# Patient Record
Sex: MALE | Race: BLACK OR AFRICAN AMERICAN | NOT HISPANIC OR LATINO | Employment: FULL TIME | URBAN - METROPOLITAN AREA
[De-identification: names, ages, dates, MRNs, and addresses within clinical notes are randomized per-mention and may not be internally consistent; named-entity substitution may affect disease eponyms.]

---

## 2017-10-19 ENCOUNTER — HOSPITAL ENCOUNTER (OUTPATIENT)
Dept: RADIOLOGY | Facility: HOSPITAL | Age: 57
Discharge: HOME | End: 2017-10-19
Attending: INTERNAL MEDICINE
Payer: COMMERCIAL

## 2017-10-19 DIAGNOSIS — M25.552 LEFT HIP PAIN: Primary | ICD-10-CM

## 2017-10-19 DIAGNOSIS — M25.552 LEFT HIP PAIN: ICD-10-CM

## 2017-10-19 PROCEDURE — 73502 X-RAY EXAM HIP UNI 2-3 VIEWS: CPT | Mod: 26,LT,, | Performed by: RADIOLOGY

## 2017-10-19 PROCEDURE — 73502 X-RAY EXAM HIP UNI 2-3 VIEWS: CPT | Mod: TC,LT

## 2017-10-26 PROBLEM — Z96.641 STATUS POST RIGHT HIP REPLACEMENT: Status: ACTIVE | Noted: 2017-10-26

## 2017-10-26 PROBLEM — I73.9 PVD (PERIPHERAL VASCULAR DISEASE): Status: ACTIVE | Noted: 2017-10-26

## 2017-10-31 ENCOUNTER — HOSPITAL ENCOUNTER (OUTPATIENT)
Dept: RADIOLOGY | Facility: HOSPITAL | Age: 57
Discharge: HOME | End: 2017-10-31
Attending: SURGERY
Payer: COMMERCIAL

## 2017-10-31 DIAGNOSIS — I10 HYPERTENSION, UNSPECIFIED TYPE: ICD-10-CM

## 2017-10-31 DIAGNOSIS — E78.5 DYSLIPIDEMIA: ICD-10-CM

## 2017-10-31 DIAGNOSIS — Z95.820 S/P ANGIOPLASTY WITH STENT: Chronic | ICD-10-CM

## 2017-10-31 DIAGNOSIS — I73.9 PVD (PERIPHERAL VASCULAR DISEASE): ICD-10-CM

## 2017-10-31 PROCEDURE — 93925 LOWER EXTREMITY STUDY: CPT | Mod: TC

## 2017-10-31 PROCEDURE — 93925 LOWER EXTREMITY STUDY: CPT | Mod: 26,,, | Performed by: RADIOLOGY

## 2017-11-07 PROBLEM — L03.031 INFECTION OF NAIL BED OF TOE OF RIGHT FOOT: Status: ACTIVE | Noted: 2017-11-07

## 2017-11-09 ENCOUNTER — HOSPITAL ENCOUNTER (OUTPATIENT)
Dept: PREADMISSION TESTING | Facility: HOSPITAL | Age: 57
Discharge: HOME | End: 2017-11-09
Attending: SURGERY
Payer: COMMERCIAL

## 2017-11-09 ENCOUNTER — ANESTHESIA EVENT (OUTPATIENT)
Dept: SURGERY | Facility: HOSPITAL | Age: 57
End: 2017-11-09
Payer: COMMERCIAL

## 2017-11-09 VITALS
TEMPERATURE: 98 F | HEIGHT: 76 IN | DIASTOLIC BLOOD PRESSURE: 78 MMHG | WEIGHT: 241.5 LBS | SYSTOLIC BLOOD PRESSURE: 143 MMHG | RESPIRATION RATE: 19 BRPM | BODY MASS INDEX: 29.41 KG/M2 | OXYGEN SATURATION: 99 % | HEART RATE: 86 BPM

## 2017-11-09 PROCEDURE — 93005 ELECTROCARDIOGRAM TRACING: CPT

## 2017-11-09 NOTE — DISCHARGE INSTRUCTIONS
Your surgery is scheduled for  11/10/2017    Please report to Procedure Check-in Room on the 2nd floor at  10:15  A.M.          INSTRUCTIONS IMPORTANT!!!  ¨ Do not eat or drink after 4 AM-including water. OK to brush teeth, no   gum, candy or mints!      NO MEDICATIONS IN THE MORNING      ____  Do not wear makeup, including mascara.  ____  Please remove all jewelry, including piercings and leave at home.  ____  No money or valuables needed. Please leave at home.  ____  If going home the same day, arrange for a ride home. You will not be able to             drive if Anesthesia was used.  ____  Wear loose fitting clothing. Allow for dressings, bandages.  ____  Stop Aspirin, Ibuprofen, Motrin and Aleve at least 3-5 days before surgery, unless otherwise instructed by your doctor, or the nurse.            You MAY use Tylenol/acetaminophen until day of surgery.  ____ Stop taking any Fish Oil supplements or any Vitamins that contain Vitamin E at least 5 days prior to surgery.  ____  If you take diabetic medication, do not take am of surgery unless instructed by Doctor.  ____  Call MD for temperature above 101 degrees.  ____ Do Not wear your contact lenses the day of your procedure.  You may wear your glasses.        I have read or had read and explained to me, and understand the above information.  Additional comments or instructions:  For additional questions call [phone]     Take a Hibiclens shower the night before and  the morning of surgery.    NO powders, creams, or lotions on your skin the day of surgery.          Pre-Op Bathing Instructions    Before surgery, you can play an important role in your own health.    Because skin is not sterile, we need to be sure that your skin is as free of germs as possible. By following the instructions below, you can reduce the number of germs on your skin before surgery.    IMPORTANT: You will need to shower with a special soap called Hibiclens*, available at any pharmacy.  If  you are allergic to Chlorhexidine (the antiseptic in Hibiclens), use an antibacterial soap such as Dial Soap for your preoperative shower.  You will shower with Hibiclens the night before and the morning of your surgery.  Do not use Hibiclens on the head, face or genitals to avoid injury to those areas.     TWICE DAILY STARTING 3 DAYS PRIOR TO YOUR SURGERY     1. Do not shave the area of your body where your surgery will be performed.  2. Shower and wash your hair and body as usual with your normal soap and shampoo.  3. Rinse your hair and body thoroughly after you shower to remove all soap residue.  4. With your hand, apply Hibiclens soap to the surgical site.   5. Wash the site gently for five (5) minutes. Do not scrub your skin too hard.   6. Do not wash with your regular soap after Hibiclens is used.  7. Rinse your body thoroughly.  8. Pat yourself dry with a clean, soft towel.  9. Do not use lotion, cream, or powder.  10. Wear clean clothes.     THE MORNING OF YOUR SURGERY     1. Repeat above showering procedure.    * Not to be used by people allergic to Chlorhexidine.              Anesthesia: Monitored Anesthesia Care (MAC)    Youre due to have surgery. During surgery, youll be given medicine called anesthesia. This will keep you comfortable and pain-free. Your surgeon will use monitored anesthesia care (MAC). This sheet tells you more about this type of anesthesia.  What is monitored anesthesia care?  MAC keeps you very drowsy during surgery. You may be awake, but you will likely not remember much. And you wont feel pain. With MAC, medicines are given through an IV line into a vein in your arm or hand. A local anesthetic will usually be injected into the skin and muscle around the surgical site to numb it. The anesthesia provider monitors you during the procedure. He or she checks your heart rate and rhythm, blood pressure, and blood oxygen level.  Anesthesia tools and medicines that may be near you during  your procedure  You will likely have:  · A pulse oximeter on the end of your finger. This measures your blood oxygen level.  · Electrocardiography leads (electrodes) on your chest. These record your heart rate and rhythm.  · Medicines given through an IV. These relax you and prevent pain. You may be awake or sleep lightly. If you have local anesthetic, it is injected directly into your skin.  · A facemask to give you oxygen, if needed.  Risks and possible complications  MAC has some risks. These include:  · Breathing problems  · Nausea and vomiting  · Allergic reaction to the anesthetic    Anesthesia safety  Tips for anesthesia safety include the following:   · Follow all instructions you are given for how long not to eat or drink before your procedure.  · Be sure your healthcare provider knows what medicines you take, especially any anti-inflammatory medicine or blood thinners. This includes aspirin and any other over-the-counter medicines, herbs, and supplements.  · Have an adult family member or friend drive you home after the procedure.  · For the first 24 hours after your surgery:  ¨ Do not drive or use heavy equipment.  ¨ Do not make important decisions or sign documents.  ¨ Avoid alcohol.  ¨ Have someone stay with you, if possible. They can watch for problems and help keep you safe.  Date Last Reviewed: 12/1/2016  © 4472-8825 [employer], [employer]. [address] [place] [address], [place] [address]. All rights reserved. This information is not intended as a substitute for professional medical care. Always follow your healthcare professional's instructions.

## 2017-11-09 NOTE — PLAN OF CARE
Allergies, medical, surgical, family and psychosocial histories reviewed with patient.  Periop plan of care discussed. Preop instructions given, including medications to take and to hold. Time given for questions and pt voiced understanding.

## 2017-11-09 NOTE — PRE-PROCEDURE INSTRUCTIONS
Your surgery is scheduled for  11/10/2017    Please report to Procedure Check-in Room on the 2nd floor at  10:15  A.M.          INSTRUCTIONS IMPORTANT!!!  ¨ Do not eat or drink after 4 AM-including water. OK to brush teeth, no   gum, candy or mints!      NO MEDICATIONS IN THE MORNING

## 2017-11-09 NOTE — ANESTHESIA PREPROCEDURE EVALUATION
11/09/2017  Santiago Lucia is a 56 y.o., male is scheduled for debridement of wound to right great toe under MAC/gen on 11/10/2017.    Past Surgical History:   Procedure Laterality Date    CARDIAC CATHETERIZATION      CORONARY ANGIOPLASTY WITH STENT PLACEMENT  01/2013    [facility] RCA and LAD    LUMBAR LAMINECTOMY  2011    TOTAL HIP ARTHROPLASTY Right 12/2011          BP Readings from Last 3 Encounters:   11/09/17 (!) 143/78   11/07/17 131/78   10/26/17 (!) 162/89     Anesthesia Evaluation    I have reviewed the Patient Summary Reports.    I have reviewed the Nursing Notes.   I have reviewed the Medications.     Review of Systems  Anesthesia Hx:  No problems with previous Anesthesia  History of prior surgery of interest to airway management or planning: Previous anesthesia: General  Denies Personal Hx of Anesthesia complications.   Social:  Former Smoker, Alcohol Use    Hematology/Oncology:  Hematology Normal      Hematology Comments: Not currently on DAPT   EENT/Dental:EENT/Dental Normal   Cardiovascular:   Hypertension, well controlled Past MI (2013) CAD asymptomatic CABG/stent (JACKIE x1 2013)  Denies Dysrhythmias.   Denies Angina. PVD (non-healing wound to right great toe) hyperlipidemia        Pulmonary:   Denies Shortness of breath.    Renal/:  Renal/ Normal     Hepatic/GI:   GERD, well controlled    Neurological:  Neurology Normal    Endocrine:  Endocrine Normal    Psych:   Pt wife terminally ill         Physical Exam  General:  Obesity    Airway/Jaw/Neck:  Airway Findings: Mouth Opening: Normal Tongue: Normal  General Airway Assessment: Adult  Mallampati: II  TM Distance: Normal, at least 6 cm        Eyes/Ears/Nose:  EYES/EARS/NOSE FINDINGS: Normal   Dental:  Dental Findings: Periodontal disease, Mild, In tact   Chest/Lungs:  Chest/Lungs Clear    Heart/Vascular:  Heart Findings: Normal Heart murmur:  negative    Abdomen:  Abdomen Findings: Normal     Skin:  Skin Findings:  Right great toe exam deferred     Mental Status:  Mental Status Findings: Normal        Anesthesia Plan  Type of Anesthesia, risks & benefits discussed:  Anesthesia Type:  general, MAC  Patient's Preference:   Intra-op Monitoring Plan:   Intra-op Monitoring Plan Comments:   Post Op Pain Control Plan:   Post Op Pain Control Plan Comments:   Induction:   IV  Beta Blocker:  Patient is not currently on a Beta-Blocker (No further documentation required).       Informed Consent: Patient understands risks and agrees with Anesthesia plan.  Questions answered.   ASA Score: 3     Day of Surgery Review of History & Physical: I have interviewed and examined the patient. I have reviewed the patient's H&P dated:        Anesthesia Plan Notes: Anesthesia consent will be obtained prior to procedure on 11/10/2017.        Ready For Surgery From Anesthesia Perspective.

## 2017-11-10 ENCOUNTER — ANESTHESIA (OUTPATIENT)
Dept: SURGERY | Facility: HOSPITAL | Age: 57
End: 2017-11-10
Payer: COMMERCIAL

## 2017-11-10 ENCOUNTER — HOSPITAL ENCOUNTER (OUTPATIENT)
Facility: HOSPITAL | Age: 57
Discharge: HOME | End: 2017-11-10
Attending: SURGERY | Admitting: SURGERY
Payer: COMMERCIAL

## 2017-11-10 ENCOUNTER — SURGERY (OUTPATIENT)
Age: 57
End: 2017-11-10

## 2017-11-10 VITALS
SYSTOLIC BLOOD PRESSURE: 141 MMHG | OXYGEN SATURATION: 97 % | DIASTOLIC BLOOD PRESSURE: 82 MMHG | BODY MASS INDEX: 28.98 KG/M2 | HEIGHT: 76 IN | HEART RATE: 82 BPM | WEIGHT: 238 LBS | RESPIRATION RATE: 17 BRPM | TEMPERATURE: 98 F

## 2017-11-10 DIAGNOSIS — I73.9 PVD (PERIPHERAL VASCULAR DISEASE): ICD-10-CM

## 2017-11-10 DIAGNOSIS — E78.5 DYSLIPIDEMIA: ICD-10-CM

## 2017-11-10 DIAGNOSIS — Z96.641 STATUS POST RIGHT HIP REPLACEMENT: ICD-10-CM

## 2017-11-10 DIAGNOSIS — L03.031 INFECTION OF NAIL BED OF TOE OF RIGHT FOOT: Primary | ICD-10-CM

## 2017-11-10 DIAGNOSIS — I25.10 CORONARY ARTERY DISEASE, ANGINA PRESENCE UNSPECIFIED, UNSPECIFIED VESSEL OR LESION TYPE, UNSPECIFIED WHETHER NATIVE OR TRANSPLANTED HEART: ICD-10-CM

## 2017-11-10 DIAGNOSIS — I10 HYPERTENSION, UNSPECIFIED TYPE: ICD-10-CM

## 2017-11-10 LAB
GRAM STN SPEC: NORMAL

## 2017-11-10 PROCEDURE — 36000707: Performed by: SURGERY

## 2017-11-10 PROCEDURE — 25000003 PHARM REV CODE 250: Performed by: SURGERY

## 2017-11-10 PROCEDURE — 87205 SMEAR GRAM STAIN: CPT

## 2017-11-10 PROCEDURE — 71000016 HC POSTOP RECOV ADDL HR: Performed by: SURGERY

## 2017-11-10 PROCEDURE — 63600175 PHARM REV CODE 636 W HCPCS: Performed by: NURSE ANESTHETIST, CERTIFIED REGISTERED

## 2017-11-10 PROCEDURE — 87077 CULTURE AEROBIC IDENTIFY: CPT | Mod: 59

## 2017-11-10 PROCEDURE — 87075 CULTR BACTERIA EXCEPT BLOOD: CPT

## 2017-11-10 PROCEDURE — 71000015 HC POSTOP RECOV 1ST HR: Performed by: SURGERY

## 2017-11-10 PROCEDURE — 88305 TISSUE EXAM BY PATHOLOGIST: CPT | Mod: 26,,, | Performed by: PATHOLOGY

## 2017-11-10 PROCEDURE — 36000706: Performed by: SURGERY

## 2017-11-10 PROCEDURE — 37000008 HC ANESTHESIA 1ST 15 MINUTES: Performed by: SURGERY

## 2017-11-10 PROCEDURE — 88305 TISSUE EXAM BY PATHOLOGIST: CPT | Performed by: PATHOLOGY

## 2017-11-10 PROCEDURE — 63600175 PHARM REV CODE 636 W HCPCS: Performed by: SURGERY

## 2017-11-10 PROCEDURE — 87186 SC STD MICRODIL/AGAR DIL: CPT

## 2017-11-10 PROCEDURE — 37000009 HC ANESTHESIA EA ADD 15 MINS: Performed by: SURGERY

## 2017-11-10 PROCEDURE — 87070 CULTURE OTHR SPECIMN AEROBIC: CPT

## 2017-11-10 RX ORDER — MIDAZOLAM HYDROCHLORIDE 1 MG/ML
INJECTION, SOLUTION INTRAMUSCULAR; INTRAVENOUS
Status: DISCONTINUED | OUTPATIENT
Start: 2017-11-10 | End: 2017-11-10

## 2017-11-10 RX ORDER — FENTANYL CITRATE 50 UG/ML
INJECTION, SOLUTION INTRAMUSCULAR; INTRAVENOUS
Status: DISCONTINUED | OUTPATIENT
Start: 2017-11-10 | End: 2017-11-10

## 2017-11-10 RX ORDER — CEFAZOLIN SODIUM 2 G/50ML
2 SOLUTION INTRAVENOUS
Status: COMPLETED | OUTPATIENT
Start: 2017-11-10 | End: 2017-11-10

## 2017-11-10 RX ORDER — PROPOFOL 10 MG/ML
VIAL (ML) INTRAVENOUS
Status: DISCONTINUED | OUTPATIENT
Start: 2017-11-10 | End: 2017-11-10

## 2017-11-10 RX ORDER — SODIUM CHLORIDE 9 MG/ML
INJECTION, SOLUTION INTRAVENOUS CONTINUOUS
Status: DISCONTINUED | OUTPATIENT
Start: 2017-11-10 | End: 2017-11-10 | Stop reason: HOSPADM

## 2017-11-10 RX ORDER — HYDROCODONE BITARTRATE AND ACETAMINOPHEN 5; 325 MG/1; MG/1
1 TABLET ORAL EVERY 4 HOURS PRN
Status: DISCONTINUED | OUTPATIENT
Start: 2017-11-10 | End: 2017-11-10 | Stop reason: HOSPADM

## 2017-11-10 RX ORDER — HYDROCODONE BITARTRATE AND ACETAMINOPHEN 10; 325 MG/1; MG/1
1 TABLET ORAL EVERY 4 HOURS PRN
Status: DISCONTINUED | OUTPATIENT
Start: 2017-11-10 | End: 2017-11-10 | Stop reason: HOSPADM

## 2017-11-10 RX ORDER — CEPHALEXIN 500 MG/1
500 CAPSULE ORAL EVERY 12 HOURS
Qty: 20 CAPSULE | Refills: 0 | Status: SHIPPED | OUTPATIENT
Start: 2017-11-10 | End: 2017-11-20

## 2017-11-10 RX ORDER — LIDOCAINE HYDROCHLORIDE 10 MG/ML
INJECTION, SOLUTION EPIDURAL; INFILTRATION; INTRACAUDAL; PERINEURAL
Status: DISCONTINUED | OUTPATIENT
Start: 2017-11-10 | End: 2017-11-10 | Stop reason: HOSPADM

## 2017-11-10 RX ORDER — ACETAMINOPHEN 325 MG/1
650 TABLET ORAL EVERY 4 HOURS PRN
Status: DISCONTINUED | OUTPATIENT
Start: 2017-11-10 | End: 2017-11-10 | Stop reason: HOSPADM

## 2017-11-10 RX ORDER — LIDOCAINE HCL/PF 100 MG/5ML
SYRINGE (ML) INTRAVENOUS
Status: DISCONTINUED | OUTPATIENT
Start: 2017-11-10 | End: 2017-11-10

## 2017-11-10 RX ORDER — BACITRACIN 50000 [IU]/1
INJECTION, POWDER, FOR SOLUTION INTRAMUSCULAR
Status: DISCONTINUED | OUTPATIENT
Start: 2017-11-10 | End: 2017-11-10 | Stop reason: HOSPADM

## 2017-11-10 RX ORDER — MUPIROCIN 20 MG/G
OINTMENT TOPICAL
Status: DISCONTINUED | OUTPATIENT
Start: 2017-11-10 | End: 2017-11-10 | Stop reason: HOSPADM

## 2017-11-10 RX ORDER — HYDROCODONE BITARTRATE AND ACETAMINOPHEN 5; 325 MG/1; MG/1
1 TABLET ORAL EVERY 6 HOURS PRN
Qty: 20 TABLET | Refills: 0 | Status: SHIPPED | OUTPATIENT
Start: 2017-11-10 | End: 2018-01-25

## 2017-11-10 RX ORDER — PROPOFOL 10 MG/ML
VIAL (ML) INTRAVENOUS CONTINUOUS PRN
Status: DISCONTINUED | OUTPATIENT
Start: 2017-11-10 | End: 2017-11-10

## 2017-11-10 RX ADMIN — FENTANYL CITRATE 25 MCG: 50 INJECTION, SOLUTION INTRAMUSCULAR; INTRAVENOUS at 12:11

## 2017-11-10 RX ADMIN — LIDOCAINE HYDROCHLORIDE 20 ML: 10 INJECTION, SOLUTION EPIDURAL; INFILTRATION; INTRACAUDAL; PERINEURAL at 10:11

## 2017-11-10 RX ADMIN — MIDAZOLAM 2 MG: 1 INJECTION INTRAMUSCULAR; INTRAVENOUS at 12:11

## 2017-11-10 RX ADMIN — BACITRACIN 50000 UNITS: 5000 INJECTION, POWDER, FOR SOLUTION INTRAMUSCULAR at 10:11

## 2017-11-10 RX ADMIN — PROPOFOL 50 MG: 10 INJECTION, EMULSION INTRAVENOUS at 12:11

## 2017-11-10 RX ADMIN — SODIUM CHLORIDE: 0.9 INJECTION, SOLUTION INTRAVENOUS at 12:11

## 2017-11-10 RX ADMIN — PROPOFOL 175 MCG/KG/MIN: 10 INJECTION, EMULSION INTRAVENOUS at 12:11

## 2017-11-10 RX ADMIN — LIDOCAINE HYDROCHLORIDE 80 MG: 20 INJECTION, SOLUTION INTRAVENOUS at 12:11

## 2017-11-10 RX ADMIN — CEFAZOLIN SODIUM 2 G: 2 SOLUTION INTRAVENOUS at 12:11

## 2017-11-10 NOTE — DISCHARGE INSTRUCTIONS
Discharge Instructions for Foot Surgery  Arrange to have an adult drive you home after surgery. If you had general anesthesia, it may take a day or more to fully recover. So, for at least the next 24 hours: Do not drive or use machinery or power tools; do not drink alcohol; and do not make any major decisions.  Diet  Here are some dietary suggestions following surgery:   · Start with liquids and light foods (like dry toast, bananas, and applesauce). As you feel up to it, slowly return to your normal diet.  · Drink at least 6 to 8 glasses of water or other nonalcoholic fluids a day.  · To avoid nausea, eat before taking narcotic pain medicines.  Medicines  It is important to follow these directions:   · Take all medicines as instructed.  · Take pain medicines on time. Do not wait until the pain is bad before taking your medicines.  · Avoid alcohol while on pain medicines.  Activity  These instructions are to help with your recovery:   · Sit or lie down when possible. Put a pillow under your heel to raise your foot above the level of your heart.  · Wrap an ice pack or bag of frozen peas in a thin cloth. Place it over your bandaged foot for no longer than 20 minutes. Do this three times a day.  · You can drive again in seven days or as instructed by your healthcare provider.  · Wear your surgical shoe at all times unless told otherwise by your healthcare provider.  · Follow your healthcare providers instructions about putting weight on your foot.  Bandage and cast care  Here are tips to follow:   · Keep dressing dry until follow up visit in one week  · If dressing comes off or gets dirty/wet, replace dressing as needed.  What to expect  It is normal to have the following:  · Bruising and slight swelling of the foot and toes  · A small amount of blood on the dressing  Call your healthcare provider   Contact your healthcare provider right away if you have any of the following:   · Continuous bleeding through the  bandage  · Excessive swelling, increased bleeding, or redness  · Fever over 100.4°F (38°C) or chills  · Pain unrelieved by pain medicines  · Foot feels cold to the touch or numb  · Increased ache in your leg or foot  · Chest pain or shortness of breath  · Anything unusual that concerns you   Date Last Reviewed: 9/26/2015  © 7486-5165 [employer]. [address] [place] [address], [place] [address]. All rights reserved. This information is not intended as a substitute for professional medical care. Always follow your healthcare professional's instructions.

## 2017-11-10 NOTE — ANESTHESIA POSTPROCEDURE EVALUATION
"Anesthesia Post Evaluation    Patient: Santiago Lucia    Procedure(s) Performed: Procedure(s) (LRB):  DEBRIDEMENT-WOUND (Right)    Final Anesthesia Type: MAC  Patient location during evaluation: OPS  Patient participation: Yes- Able to Participate  Level of consciousness: awake and alert and awake  Post-procedure vital signs: reviewed and stable  Pain management: adequate  Airway patency: patent  PONV status at discharge: No PONV  Anesthetic complications: no      Cardiovascular status: blood pressure returned to baseline, stable and hemodynamically stable  Respiratory status: unassisted, spontaneous ventilation and room air  Hydration status: euvolemic  Follow-up not needed.        Visit Vitals  BP (!) 147/79 (BP Location: Left arm, Patient Position: Lying)   Pulse 91   Temp 36.9 °C (98.4 °F) (Oral)   Resp 18   Ht 6' 4" (1.93 m)   Wt 108 kg (238 lb)   SpO2 98%   BMI 28.97 kg/m²       Pain/Loyd Score: Pain Assessment Performed: Yes (11/10/2017 10:50 AM)  Presence of Pain: denies (11/10/2017 10:50 AM)      "

## 2017-11-10 NOTE — OP NOTE
DATE OF PROCEDURE:  11/10/2017.    PREOPERATIVE DIAGNOSIS:  Infected nonhealing right great toe wound with   ingrowing nail.    POSTOPERATIVE DIAGNOSIS:  Infected nonhealing right great toe wound with   ingrowing nail.    OPERATION:  Excision and debridement, right great toe wound and excision of the   nail.    SURGEON:  Lane Nicole M.D.    ANESTHESIA:  Xylocaine 1% with IV sedation.    PROCEDURE IN DETAILS:  After satisfactory IV sedation, the patient was   positioned.  Right foot was prepped and draped in normal sterile manner using   ChloraPrep.  A stockinette was applied.  The area of the base of the great toe   was infiltrated using 1% Xylocaine solution.  With the help of knife and   Metzenbaum, all infected necrotic tissue was debrided up to freshly bleeding   tissue.  The nail was  from the nail bed.  Complete excision was   performed into the deep subcutaneous tissue.  Wound was cultured for aerobic and   anaerobic.  Hemostasis was satisfactorily maintained.  It was cauterized using   electrocautery.  It was thoroughly irrigated with antibiotic solution.  We then   dressed using Xeroform, 4 x 4, Kerlix.  Sterile gauze dressing was applied.  The   instrument count, sponge count and needle count were correct.  The patient   tolerated it well, sent to recovery room in stable condition.  Estimated blood   loss was 20 mL.  Specimen removed was infected tissue.      MS/ALEX  dd: 11/10/2017 12:39:31 (CST)  td: 11/10/2017 14:15:41 (CST)  Doc ID   #[id number]  Job ID #[id number]    CC:

## 2017-11-10 NOTE — TRANSFER OF CARE
"Anesthesia Transfer of Care Note    Patient: Santiago Lucia    Procedure(s) Performed: Procedure(s) (LRB):  DEBRIDEMENT-WOUND (Right)    Patient location: OPS    Anesthesia Type: MAC    Transport from OR: Transported from OR on room air with adequate spontaneous ventilation    Post pain: adequate analgesia    Post assessment: no apparent anesthetic complications and tolerated procedure well    Post vital signs: stable    Level of consciousness: awake, alert and oriented    Nausea/Vomiting: no nausea/vomiting    Complications: none    Transfer of care protocol was followed      Last vitals:   Visit Vitals  BP (!) 147/79 (BP Location: Left arm, Patient Position: Lying)   Pulse 91   Temp 36.9 °C (98.4 °F) (Oral)   Resp 18   Ht 6' 4" (1.93 m)   Wt 108 kg (238 lb)   SpO2 98%   BMI 28.97 kg/m²     "

## 2017-11-10 NOTE — BRIEF OP NOTE
Operative Note       Surgery Date: 11/10/2017     Surgeon(s) and Role:     * Lane Nicole MD - Primary    Pre-op Diagnosis:  PVD (peripheral vascular disease) [I73.9]  Dyslipidemia [E78.5]  Coronary artery disease, angina presence unspecified, unspecified vessel or lesion type, unspecified whether native or transplanted heart [I25.10]  Infection of nail bed of toe of right foot [L03.031]  Hypertension, unspecified type [I10]    Post-op Diagnosis: Post-Op Diagnosis Codes:     * PVD (peripheral vascular disease) [I73.9]     * Dyslipidemia [E78.5]     * Coronary artery disease, angina presence unspecified, unspecified vessel or lesion type, unspecified whether native or transplanted heart [I25.10]     * Infection of nail bed of toe of right foot [L03.031]     * Hypertension, unspecified type [I10]    Procedure(s) (LRB):  DEBRIDEMENT-WOUND (Right)  With excision of nail bed right big toe  Anesthesia: Local MAC    Procedure in Detail/Findings:  dictated    Estimated Blood Loss: * No values recorded between 11/10/2017 12:23 PM and 11/10/2017 12:36 PM *           Specimens     None        Implants: * No implants in log *           Disposition: PACU - hemodynamically stable.           Condition: Good    Attestation:  I performed the procedure.           Discharge Note    Admit Date: 11/10/2017    Attending Physician: Lane Nicole MD     Discharge Physician: Lane Nicole MD    Final Diagnosis: Post-Op Diagnosis Codes:     * PVD (peripheral vascular disease) [I73.9]     * Dyslipidemia [E78.5]     * Coronary artery disease, angina presence unspecified, unspecified vessel or lesion type, unspecified whether native or transplanted heart [I25.10]     * Infection of nail bed of toe of right foot [L03.031]     * Hypertension, unspecified type [I10]    Disposition: Home or Self Care    Patient Instructions:   Current Discharge Medication List      START taking these medications    Details   !! cephALEXin  (KEFLEX) 500 MG capsule Take 1 capsule (500 mg total) by mouth every 12 (twelve) hours.  Qty: 20 capsule, Refills: 0      hydrocodone-acetaminophen 5-325mg (NORCO) 5-325 mg per tablet Take 1 tablet by mouth every 6 (six) hours as needed for Pain.  Qty: 20 tablet, Refills: 0       !! - Potential duplicate medications found. Please discuss with provider.      CONTINUE these medications which have NOT CHANGED    Details   !! cephALEXin (KEFLEX) 500 MG capsule Take 1 capsule (500 mg total) by mouth every 6 (six) hours.  Qty: 24 capsule, Refills: 1      hydrocodone-acetaminophen 7.5-325mg (NORCO) 7.5-325 mg per tablet TK 1 T PO BID PRF SEVERE PAIN  Refills: 0      naproxen sodium (ANAPROX) 550 MG tablet TK 1 T PO Q 12 H WF OR MILK PRN  Refills: 2       !! - Potential duplicate medications found. Please discuss with provider.          Discharge Procedure Orders (must include Diet, Follow-up, Activity)    Discharge Procedure Orders (must include Diet, Follow-up, Activity)  Diet general     Activity as tolerated     Keep surgical extremity elevated     Lifting restrictions     Call MD for:  temperature >100.4     Call MD for:  persistent nausea and vomiting     Call MD for:  severe uncontrolled pain     Call MD for:  redness, tenderness, or signs of infection (pain, swelling, redness, odor or green/yellow discharge around incision site)     Leave dressing on - Keep it clean, dry, and intact until clinic visit          Discharge Date:11/10 2017

## 2017-11-10 NOTE — H&P
"History of Present Illness:  Patient is a 56 y.o. male presents with            Chief Complaint   Patient presents with    Results       US Lower Extremity Arteries Bilateral 10/31/17; F/u for wound big toe right foot    Cyst       pt c/o pain right elbow x one day         Review of patient's allergies indicates:  No Known Allergies     Current Medications          Current Outpatient Prescriptions   Medication Sig Dispense Refill    aspirin 325 MG tablet Take 1 tablet (325 mg total) by mouth once daily. 30 tablet 12    hydrocodone-acetaminophen 7.5-325mg (NORCO) 7.5-325 mg per tablet TK 1 T PO BID PRF SEVERE PAIN   0    naproxen sodium (ANAPROX) 550 MG tablet TK 1 T PO Q 12 H WF OR MILK PRN   2      No current facility-administered medications for this visit.                  Past Medical History:   Diagnosis Date    Coronary artery disease      NSTEMI (non-ST elevated myocardial infarction) 1/13            Past Surgical History:   Procedure Laterality Date    BACK SURGERY        CARDIAC CATHETERIZATION        CORONARY ANGIOPLASTY   1/13     JACKIE RCA and LAD    HIP SURGERY         right hip thr      History reviewed. No pertinent family history.          Social History   Substance Use Topics    Smoking status: Former Smoker       Quit date: 1/8/2013    Smokeless tobacco: Not on file    Alcohol use Yes          Comment: social         Review of Systems:     Review of Systems   Constitutional: Negative.    HENT: Negative.    Eyes: Negative.    Respiratory: Negative.    Cardiovascular: Negative.    Gastrointestinal: Negative.    Genitourinary: Negative.    Musculoskeletal: Negative.    Skin: Positive for color change, rash and wound.   Neurological: Negative.    Psychiatric/Behavioral: Negative.          OBJECTIVE:      Vital Signs (Most Recent)  Pulse: 100 (11/07/17 1503)  Resp: 17 (11/07/17 1503)  BP: 131/78 (11/07/17 1503)  6' 3" (1.905 m)  113.9 kg (251 lb)      Physical Exam:     Physical Exam "   Constitutional: He is oriented to person, place, and time. He appears well-developed and well-nourished.   HENT:   Head: Normocephalic.   Eyes: Conjunctivae and EOM are normal. Pupils are equal, round, and reactive to light.   Neck: Normal range of motion. Neck supple.   Cardiovascular: Normal rate, regular rhythm, normal heart sounds and intact distal pulses.    Pulmonary/Chest: Effort normal and breath sounds normal.   Abdominal: Soft. Bowel sounds are normal.   Musculoskeletal: Normal range of motion.   Neurological: He is alert and oriented to person, place, and time. He has normal reflexes.   Skin: Skin is warm and dry.              Laboratory        Diagnostic Results:        ASSESSMENT/PLAN:   pvd   Hypertension  Infected right big toe        PLAN:Plan   Excision and debridement right big toe

## 2017-11-13 LAB
BACTERIA SPEC AEROBE CULT: NORMAL
BACTERIA SPEC AEROBE CULT: NORMAL

## 2017-11-14 LAB — BACTERIA SPEC ANAEROBE CULT: NORMAL

## 2017-11-16 NOTE — PHYSICIAN QUERY
PT Name: Santiago Lucia  MR #: [id number]     Physician Query Form - Documentation Clarification      CDS/: Bernadine Mario               Contact information: [email or web address]    This form is a permanent document in the medical record.     Query Date: November 16, 2017    By submitting this query, we are merely seeking further clarification of documentation. Please utilize your independent clinical judgment when addressing the question(s) below.    The Medical record reflects the following:    Supporting Clinical Findings Location in Medical Record    Excision and debridement, right great toe wound and excision of the   nail.    Infected nonhealing right great toe wound with   ingrowing nail.   Operative Report                                                                                      Dear Doctor, Please report the size of the right great toe wound that was debrided.    Provider Use Only  3 x 4 x 3 cm                                                                                                                             [  ] Clinically undetermined

## 2018-01-10 ENCOUNTER — HOSPITAL ENCOUNTER (OUTPATIENT)
Dept: RADIOLOGY | Facility: OTHER | Age: 58
Discharge: HOME | End: 2018-01-10
Attending: ORTHOPAEDIC SURGERY
Payer: COMMERCIAL

## 2018-01-10 DIAGNOSIS — M87.052 AVASCULAR NECROSIS OF LEFT FEMORAL HEAD: ICD-10-CM

## 2018-01-10 DIAGNOSIS — M25.552 LEFT HIP PAIN: ICD-10-CM

## 2018-01-10 PROCEDURE — 72195 MRI PELVIS W/O DYE: CPT | Mod: 26,,, | Performed by: RADIOLOGY

## 2018-01-10 PROCEDURE — 72195 MRI PELVIS W/O DYE: CPT | Mod: TC

## 2018-10-23 DIAGNOSIS — Z01.818 PREOP EXAMINATION: ICD-10-CM

## 2018-10-23 DIAGNOSIS — I25.10 CORONARY ATHEROSCLEROSIS OF NATIVE CORONARY ARTERY: Primary | ICD-10-CM

## 2018-10-24 ENCOUNTER — HOSPITAL ENCOUNTER (OUTPATIENT)
Dept: RADIOLOGY | Facility: HOSPITAL | Age: 58
Discharge: HOME | End: 2018-10-24
Attending: INTERNAL MEDICINE
Payer: COMMERCIAL

## 2018-10-24 ENCOUNTER — CLINICAL SUPPORT (OUTPATIENT)
Dept: LAB | Facility: HOSPITAL | Age: 58
End: 2018-10-24
Attending: INTERNAL MEDICINE
Payer: COMMERCIAL

## 2018-10-24 DIAGNOSIS — Z01.818 PREOP EXAMINATION: ICD-10-CM

## 2018-10-24 DIAGNOSIS — I25.10 CORONARY ATHEROSCLEROSIS OF NATIVE CORONARY ARTERY: ICD-10-CM

## 2018-10-24 PROCEDURE — 71046 X-RAY EXAM CHEST 2 VIEWS: CPT | Mod: 26,,, | Performed by: RADIOLOGY

## 2018-10-24 PROCEDURE — 93010 ELECTROCARDIOGRAM REPORT: CPT | Mod: ,,, | Performed by: INTERNAL MEDICINE

## 2018-10-24 PROCEDURE — 93010 EKG 12-LEAD: ICD-10-PCS | Mod: ,,, | Performed by: INTERNAL MEDICINE

## 2018-10-24 PROCEDURE — 71046 X-RAY EXAM CHEST 2 VIEWS: CPT | Mod: TC,FY

## 2018-10-24 PROCEDURE — 93005 ELECTROCARDIOGRAM TRACING: CPT

## 2018-10-30 ENCOUNTER — OFFICE VISIT (OUTPATIENT)
Dept: CARDIOLOGY | Facility: CLINIC | Age: 58
End: 2018-10-30
Payer: COMMERCIAL

## 2018-10-30 VITALS
OXYGEN SATURATION: 98 % | SYSTOLIC BLOOD PRESSURE: 125 MMHG | DIASTOLIC BLOOD PRESSURE: 84 MMHG | HEART RATE: 102 BPM | WEIGHT: 216.69 LBS | HEIGHT: 76 IN | BODY MASS INDEX: 26.39 KG/M2

## 2018-10-30 DIAGNOSIS — I25.10 CORONARY ARTERY DISEASE, ANGINA PRESENCE UNSPECIFIED, UNSPECIFIED VESSEL OR LESION TYPE, UNSPECIFIED WHETHER NATIVE OR TRANSPLANTED HEART: Primary | ICD-10-CM

## 2018-10-30 DIAGNOSIS — I73.9 PVD (PERIPHERAL VASCULAR DISEASE): ICD-10-CM

## 2018-10-30 DIAGNOSIS — M87.052 AVASCULAR NECROSIS OF BONE OF HIP, LEFT: ICD-10-CM

## 2018-10-30 PROCEDURE — 99204 OFFICE O/P NEW MOD 45 MIN: CPT | Mod: S$GLB,,, | Performed by: STUDENT IN AN ORGANIZED HEALTH CARE EDUCATION/TRAINING PROGRAM

## 2018-10-30 PROCEDURE — 3008F BODY MASS INDEX DOCD: CPT | Mod: CPTII,S$GLB,, | Performed by: STUDENT IN AN ORGANIZED HEALTH CARE EDUCATION/TRAINING PROGRAM

## 2018-10-30 PROCEDURE — 99999 PR PBB SHADOW E&M-EST. PATIENT-LVL III: CPT | Mod: PBBFAC,,, | Performed by: STUDENT IN AN ORGANIZED HEALTH CARE EDUCATION/TRAINING PROGRAM

## 2018-10-30 PROCEDURE — 3079F DIAST BP 80-89 MM HG: CPT | Mod: CPTII,S$GLB,, | Performed by: STUDENT IN AN ORGANIZED HEALTH CARE EDUCATION/TRAINING PROGRAM

## 2018-10-30 PROCEDURE — 3074F SYST BP LT 130 MM HG: CPT | Mod: CPTII,S$GLB,, | Performed by: STUDENT IN AN ORGANIZED HEALTH CARE EDUCATION/TRAINING PROGRAM

## 2018-10-30 RX ORDER — METOPROLOL SUCCINATE 25 MG/1
25 TABLET, EXTENDED RELEASE ORAL DAILY
Qty: 30 TABLET | Refills: 3 | Status: SHIPPED | OUTPATIENT
Start: 2018-10-30 | End: 2019-02-20 | Stop reason: SDUPTHER

## 2018-10-30 RX ORDER — ATORVASTATIN CALCIUM 20 MG/1
20 TABLET, FILM COATED ORAL DAILY
Qty: 30 TABLET | Refills: 3 | Status: SHIPPED | OUTPATIENT
Start: 2018-10-30 | End: 2019-02-20 | Stop reason: SDUPTHER

## 2018-10-30 RX ORDER — ASPIRIN 81 MG/1
81 TABLET ORAL DAILY
Qty: 30 TABLET | Refills: 12 | Status: SHIPPED | OUTPATIENT
Start: 2018-10-30 | End: 2020-08-04

## 2018-10-30 NOTE — LETTER
2018    Santiago Lucia  [address] [address]           [place] [facility]  [address], [address]  [facility] [address]  Phone: [phone] Patient: Santiago Lucia  : 1960  Referring Doctor:  Telephone:  Hospital:  Type of Anesthesia:  Date of Last Stent:  Date of Last Office Visit:    Current Outpatient Medications   Medication Sig    naproxen sodium (ANAPROX) 550 MG tablet TK 1 T PO Q 12 H WF OR MILK PRN    aspirin (ECOTRIN) 81 MG EC tablet Take 1 tablet (81 mg total) by mouth once daily.    atorvastatin (LIPITOR) 20 MG tablet Take 1 tablet (20 mg total) by mouth once daily.    metoprolol succinate (TOPROL-XL) 25 MG 24 hr tablet Take 1 tablet (25 mg total) by mouth once daily.     No current facility-administered medications for this visit.        This patient has been assessed for risk factors for clearance of surgery with the following stipulations:    ___ No contraindications. Proceed with surgery as planned.      If you have any questions regarding the above, please contact my office at [phone].    Sincerely,

## 2018-10-30 NOTE — PROGRESS NOTES
"   Cardiology Clinic note    Subjective:   Patient ID:  Santiago Lucia is a 57 y.o. male who presents for evaluation of cardiac clearance in the setting of known CAD s/p PCI in 2013    HPI:   Santiago Lucia  has a past medical history of Coronary artery disease and NSTEMI (non-ST elevated myocardial infarction) (01/2013).  Pt received JACKIE 3.0 stents in 2013 to the Left Circ and RCA.   He presents for cardiac clearance prior to left hip replacement for avascular necrosis.  Pt notes he has fallen off in cardiology follow ups due to increase care taking responsibilities for his wife: ESRD > PD at home  He notes he walks upto 3 miles for work. (slow ambulation due to left hip pain). Walking intensity does not make him sweat. He is able to climb 2 flights of stairs but is slowed by hip pain.   He denies any acute chest pain, no heart failure s/s, no orthopnea, no hx of palpitations or SVT.    He only takes naproxen for left hip pain. Intentional weight loss of approx 30 pounds over the past year. - healthier lifestyle and portion control.    Vitals  Vitals:    10/30/18 0800   BP: 125/84   Pulse: 102   SpO2: 98%   Weight: 98.3 kg (216 lb 11.2 oz)   Height: 6' 4" (1.93 m)       Patient Active Problem List    Diagnosis Date Noted    Avascular necrosis of bone of hip, left 10/30/2018    Coronary artery disease 11/10/2017    Infection of nail bed of toe of right foot 11/07/2017    PVD (peripheral vascular disease) 10/26/2017    Status post right hip replacement 10/26/2017    S/P angioplasty with stent 11/08/2013    NSTEMI (non-ST elevated myocardial infarction) 01/17/2013    HTN (hypertension) 01/17/2013    Dyslipidemia 01/17/2013    CAD (coronary artery disease) 01/17/2013          Medication List           Accurate as of 10/30/18  9:04 AM. If you have any questions, ask your nurse or doctor.               START taking these medications    aspirin 81 MG EC tablet  Commonly known as:  ECOTRIN  Take 1 tablet " (81 mg total) by mouth once daily.  Started by:  Arnold Rabago MD     atorvastatin 20 MG tablet  Commonly known as:  LIPITOR  Take 1 tablet (20 mg total) by mouth once daily.  Started by:  Arnold Rabago MD     metoprolol succinate 25 MG 24 hr tablet  Commonly known as:  TOPROL-XL  Take 1 tablet (25 mg total) by mouth once daily.  Started by:  Arnold Rabago MD        CONTINUE taking these medications    naproxen sodium 550 MG tablet  Commonly known as:  ANAPROX           Where to Get Your Medications      These medications were sent to [facility] [address] [facility] [address] AT [place] & [address]  [address] [facility] [address]    Phone:  [phone]   · aspirin 81 MG EC tablet  · atorvastatin 20 MG tablet  · metoprolol succinate 25 MG 24 hr tablet           Review of Systems   Constitution: Negative for chills, decreased appetite, weakness, malaise/fatigue and weight gain.   HENT: Negative for congestion and ear discharge.    Eyes: Negative for blurred vision and double vision.   Cardiovascular: Negative for chest pain, cyanosis, dyspnea on exertion, irregular heartbeat, leg swelling, near-syncope, orthopnea, palpitations and syncope.   Respiratory: Negative for cough, shortness of breath and sleep disturbances due to breathing.    Skin: Negative for color change and dry skin.   Musculoskeletal: Positive for joint pain and muscle weakness. Negative for joint swelling and muscle cramps.   Gastrointestinal: Negative for bloating, heartburn, hematemesis and hematochezia.   Genitourinary: Negative for bladder incontinence and dysuria.   Neurological: Negative for aphonia, excessive daytime sleepiness, dizziness, focal weakness, headaches, light-headedness and loss of balance.   Psychiatric/Behavioral: Negative for altered mental status, depression and memory loss. The patient does not have insomnia and is not nervous/anxious.          Objective:    Physical Exam   Constitutional: He is oriented to person, place, and time. He appears well-developed and well-nourished.   HENT:   Head: Normocephalic and atraumatic.   Eyes: Conjunctivae and EOM are normal.   Neck: Normal range of motion. Neck supple. No JVD present.   Cardiovascular: Normal rate, regular rhythm and normal heart sounds.   No murmur heard.  Pulmonary/Chest: Effort normal and breath sounds normal. No respiratory distress. He has no wheezes. He has no rales.   Abdominal: Soft. Bowel sounds are normal. He exhibits no distension.   Musculoskeletal: Normal range of motion. He exhibits no edema.   Neurological: He is alert and oriented to person, place, and time.   Skin: Skin is warm and dry. No erythema.   Psychiatric: He has a normal mood and affect. His behavior is normal. Judgment and thought content normal.   Nursing note and vitals reviewed.      Lab Results    Lab Results   Component Value Date     11/09/2017    K 4.0 11/09/2017     11/09/2017    CO2 24 11/09/2017    BUN 16 11/09/2017    CREATININE 0.9 11/09/2017     (H) 11/09/2017    HGBA1C 7.1 (H) 01/16/2013    MG 2.5 01/15/2013    AST 23 12/20/2013    ALT 45 (H) 12/20/2013    ALBUMIN 3.9 12/20/2013    PROT 7.3 12/20/2013    BILITOT 0.9 12/20/2013    WBC 16.28 (H) 11/09/2017    HGB 14.4 11/09/2017    HCT 44.9 11/09/2017    MCV 89 11/09/2017     11/09/2017    INR 1.0 01/16/2013    CHOL 187 12/20/2013    HDL 39 (L) 12/20/2013    LDLCALC 118.4 12/20/2013    TRIG 148 12/20/2013    BNP 38 01/15/2013       Lipid panel  Lab Results   Component Value Date    CHOL 187 12/20/2013     Lab Results   Component Value Date    HDL 39 (L) 12/20/2013     Lab Results   Component Value Date    LDLCALC 118.4 12/20/2013     Lab Results   Component Value Date    TRIG 148 12/20/2013          Cardiac Studies  Significant Imaging: Echocardiogram:   2D echo with color flow doppler:   Results for orders placed or performed in visit on 01/15/13    2D echo with color flow doppler   Result Value Ref Range    Calculated EF  55      ECG:  normal EKG, normal sinus rhythm, nonspecific ST and T waves changes.    Cath study  DIAGNOSTIC:       Patient has a right dominant coronary artery.        - Left Main Coronary Artery:             The LM is normal. There is KYMBERLY 3 flow.       - Left Anterior Descending Artery:             The LAD is normal. There is KYMBERLY 3 flow.       - Left Circumflex Artery:             The proximal LCX has a 95% stenosis. There is KYMBERLY 2 flow. The remaining portion of the vessel has luminal irregularities.       - Right Coronary Artery:             The mid RCA has a 95% stenosis. There is KYMBERLY 3 flow.    INTERVENTION:         Proximal LCX:              The lesion was successfully intervened. Post-stenosis of 0% and post-KYMBERLY 3 flow. The vessel was accessed natively.  The following items were used: Stent Xience Rx 3.0 X 18 (JACKIE).       Mid RCA:              The following items were used: Balloon Mini Trek Rx 2.0 X 15 and Stent Xience Rx 3.0 X 18 (JACKIE).    D. SUMMARY:    1. Mild Infero-basal and lateral hypokinesis with EF 45-50%  2. CAD as described:  3.      LM normal  4.      LAD normal  5.      LCx 95% stenosis  6.      RCA dominant 95% culprit stenosis  7. Successful JACKIE to RCA  8. Successful JACKIE to LCx    Assessment:     1. Coronary artery disease, angina presence unspecified, unspecified vessel or lesion type, unspecified whether native or transplanted heart    2. PVD (peripheral vascular disease)    3. Avascular necrosis of bone of hip, left        Plan:     1. Pre-op cardiac clearance  - pt is planned for non-emergent surgery of intermediate risk.  (L hip replacement)  - He has cardiac risk factors of prior ischemic heart disease but he no hx of renal disease, no s/s consistent with ACS, decompensated HF or malignant arrhythmia.  He is able to exert more than 4 mets.   - He is at low risk for MACE. Proceed with surgery as planned. No  further cardiac work up is needed.    - will start low dose metoprolol pre-operatively (now)    2. Hx of obstructive CAD s/p revascularization with JACKIE to LCx and RCA in 2013  - resume aspirin 81mg  - qualities for statin regardless of LDL: will start lipitor      Continue with current medical plan and lifestyle changes.  Return sooner for concerns or questions. If symptoms persist go to the ED    No orders of the defined types were placed in this encounter.      Follow up as scheduled. Return to clinic in 3 months   He expressed verbal understanding and agreed with the plan    Thank you for the opportunity to care for this patient. Will be available for questions if needed.     Arnold Rabago MD [facility]  Interventional Cardiology  [facility] [place]  Pager: [phone]

## 2018-12-10 ENCOUNTER — TELEPHONE (OUTPATIENT)
Dept: CARDIOLOGY | Facility: CLINIC | Age: 58
End: 2018-12-10

## 2018-12-10 NOTE — TELEPHONE ENCOUNTER
----- Message from Mera Mejia sent at 12/10/2018  1:05 PM CST -----  Contact: [phone]/self  Patient is requesting a call back regarding getting medical clearance for oral procedures. thanks

## 2018-12-11 NOTE — TELEPHONE ENCOUNTER
Spoke with pt and advised him that he is cleared to schedule his appt for the dentist per Dr. Rabago . Cardiac Clearance letter was faxed over to [facility] [phone]

## 2018-12-17 ENCOUNTER — TELEPHONE (OUTPATIENT)
Dept: CARDIOLOGY | Facility: CLINIC | Age: 58
End: 2018-12-17

## 2018-12-17 NOTE — TELEPHONE ENCOUNTER
----- Message from Romelia Robison sent at 12/17/2018 12:15 PM [facility] -----  No. [phone]    [facility] has not received the medical clearance yet.  Please refax.      Patient/Caregiver provided printed discharge information.

## 2018-12-17 NOTE — TELEPHONE ENCOUNTER
Spoke with pt and advised pt that medical clearance letter was faxed over and conformation was noted. Called office and spoke with Atiya and advised her that I will fax the medical clearance letter over again.

## 2019-02-21 RX ORDER — METOPROLOL SUCCINATE 25 MG/1
25 TABLET, EXTENDED RELEASE ORAL DAILY
Qty: 30 TABLET | Refills: 3 | Status: SHIPPED | OUTPATIENT
Start: 2019-02-21 | End: 2019-02-21 | Stop reason: SDUPTHER

## 2019-02-21 RX ORDER — ATORVASTATIN CALCIUM 20 MG/1
20 TABLET, FILM COATED ORAL DAILY
Qty: 30 TABLET | Refills: 3 | Status: SHIPPED | OUTPATIENT
Start: 2019-02-21 | End: 2019-02-21 | Stop reason: SDUPTHER

## 2019-02-22 RX ORDER — METOPROLOL SUCCINATE 25 MG/1
25 TABLET, EXTENDED RELEASE ORAL DAILY
Qty: 30 TABLET | Refills: 3 | Status: SHIPPED | OUTPATIENT
Start: 2019-02-22 | End: 2020-08-04 | Stop reason: SDUPTHER

## 2019-02-22 RX ORDER — ATORVASTATIN CALCIUM 20 MG/1
20 TABLET, FILM COATED ORAL DAILY
Qty: 30 TABLET | Refills: 3 | Status: SHIPPED | OUTPATIENT
Start: 2019-02-22 | End: 2020-08-04

## 2020-01-09 ENCOUNTER — HOSPITAL ENCOUNTER (OUTPATIENT)
Dept: RADIOLOGY | Facility: HOSPITAL | Age: 60
Discharge: HOME | End: 2020-01-09
Attending: SURGERY
Payer: COMMERCIAL

## 2020-01-09 DIAGNOSIS — I73.9 PVD (PERIPHERAL VASCULAR DISEASE): ICD-10-CM

## 2020-01-09 DIAGNOSIS — I10 HYPERTENSION, UNSPECIFIED TYPE: ICD-10-CM

## 2020-01-09 DIAGNOSIS — Z95.820 S/P ANGIOPLASTY WITH STENT: ICD-10-CM

## 2020-01-09 DIAGNOSIS — L60.0 INGROWING TOENAIL: ICD-10-CM

## 2020-01-09 PROCEDURE — 73630 X-RAY EXAM OF FOOT: CPT | Mod: 26,RT,, | Performed by: RADIOLOGY

## 2020-01-09 PROCEDURE — 73630 XR FOOT COMPLETE 3 VIEW RIGHT: ICD-10-PCS | Mod: 26,RT,, | Performed by: RADIOLOGY

## 2020-01-09 PROCEDURE — 73630 X-RAY EXAM OF FOOT: CPT | Mod: TC,FY,RT

## 2020-04-01 ENCOUNTER — HOSPITAL ENCOUNTER (OUTPATIENT)
Dept: WOUND CARE | Facility: HOSPITAL | Age: 60
Discharge: HOME | End: 2020-04-01
Attending: SURGERY
Payer: COMMERCIAL

## 2020-04-01 VITALS
WEIGHT: 220 LBS | TEMPERATURE: 98 F | BODY MASS INDEX: 27.35 KG/M2 | DIASTOLIC BLOOD PRESSURE: 78 MMHG | HEIGHT: 75 IN | HEART RATE: 87 BPM | SYSTOLIC BLOOD PRESSURE: 135 MMHG

## 2020-04-01 DIAGNOSIS — Z95.820 S/P ANGIOPLASTY WITH STENT: Chronic | ICD-10-CM

## 2020-04-01 DIAGNOSIS — L03.031 INFECTION OF NAIL BED OF TOE OF RIGHT FOOT: Primary | ICD-10-CM

## 2020-04-01 DIAGNOSIS — L60.0 INGROWING TOENAIL: ICD-10-CM

## 2020-04-01 DIAGNOSIS — I10 HYPERTENSION, UNSPECIFIED TYPE: ICD-10-CM

## 2020-04-01 PROCEDURE — 99212 OFFICE O/P EST SF 10 MIN: CPT

## 2020-04-01 PROCEDURE — 11042 DBRDMT SUBQ TIS 1ST 20SQCM/<: CPT

## 2020-04-01 NOTE — PROGRESS NOTES
"Subjective:       Patient ID: Santiago Lucia is a 59 y.o. male.    Chief Complaint: Wound Consult    04/01/2020: 58 yo male presents to wound clinic today for wound to right great toe. No open lesion noted at this time. Patient states, "I think my wound healed." Right great toe very dry and callused. Callus debrided per Dr. Nicole, still no open lesion noted. Moisturized right foot. Patient instructed to moisturize feet daily and return to wound care clinic for any new wound complications    Review of Systems   Constitutional: Negative.    HENT: Negative.    Eyes: Negative.    Respiratory: Negative.    Cardiovascular: Negative.    Gastrointestinal: Negative.    Genitourinary: Negative.    Musculoskeletal: Negative.    Neurological: Negative.    Psychiatric/Behavioral: Negative.        Objective:      Physical Exam   Constitutional: He is oriented to person, place, and time. He appears well-developed and well-nourished.   HENT:   Head: Normocephalic.   Eyes: Pupils are equal, round, and reactive to light. Conjunctivae and EOM are normal.   Neck: Normal range of motion. Neck supple.   Cardiovascular: Normal rate, regular rhythm, normal heart sounds and intact distal pulses.   Pulmonary/Chest: Effort normal and breath sounds normal.   Abdominal: Soft. Bowel sounds are normal.   Musculoskeletal: Normal range of motion.   Neurological: He is alert and oriented to person, place, and time. He has normal reflexes.   Skin: Skin is warm and dry.       Assessment:       1. Infection of nail bed of toe of right foot    2. Ingrowing toenail      [REMOVED]      Wound 04/01/20 1141 Ulceration Toe, first #1 (Removed)   04/01/20 1141    Pre-existing:    Primary Wound Type: Ulceration   Side: Right   Orientation:    Location: Toe, first   Wound/PI Number (optional): #1   Ankle-Brachial Index:    Pulses: palpable dp/pt pulses right foot   Removal Indication and Assessment:    Wound Outcome: Healed   (Retired) Wound Type:  "   (Retired) Wound Length (cm):    (Retired) Wound Width (cm):    (Retired) Depth (cm):    Wound Description (Comments):    Removal Indications:    Removed 04/01/20 1142   Dressing Appearance Open to air 4/1/2020 11:00 AM   Drainage Amount None 4/1/2020 11:00 AM   Appearance Dry;Epithelialization 4/1/2020 11:00 AM   Tissue loss description Not applicable 4/1/2020 11:00 AM   Wound Length (cm) 0 cm 4/1/2020 11:00 AM   Wound Width (cm) 0 cm 4/1/2020 11:00 AM   Wound Depth (cm) 0 cm 4/1/2020 11:00 AM   Wound Volume (cm^3) 0 cm^3 4/1/2020 11:00 AM   Wound Surface Area (cm^2) 0 cm^2 4/1/2020 11:00 AM      Callus debrided per Dr. Nicole  Right foot moisturized    Plan:                Follow up if symptoms worsen or fail to improve.

## 2020-04-01 NOTE — PROCEDURES
"Debridement  Date/Time: 4/1/2020 2:04 PM  Performed by: Lane Nicole MD  Authorized by: Lane Nicole MD     Time out: Immediately prior to procedure a "time out" was called to verify the correct patient, procedure, equipment, support staff and site/side marked as required.    Consent Done?:  Yes (Verbal)    Preparation: Patient was prepped and draped in usual sterile fashion    Local anesthesia used?: No      Wound Details:    Location:  Right foot    Location:  Right 1st Toe    Type of Debridement:  Excisional       Length (cm):  4       Area (sq cm):  20       Width (cm):  5       Percent Debrided (%):  100       Depth (cm):  0.3       Total Area Debrided (sq cm):  20    Depth of debridement:  Subcutaneous tissue    Tissue debrided:  Adipose and Subcutaneous    Instruments:  Blade, Forceps, Scissors and Curette    2nd Wound Details:     Depth of debridement:  Epidermis/Dermis    Bleeding:  Minimal  Patient tolerance:  Patient tolerated the procedure well with no immediate complications      "

## 2020-08-04 PROBLEM — M25.559 PAIN IN JOINT INVOLVING PELVIC REGION AND THIGH: Status: ACTIVE | Noted: 2020-08-04

## 2020-08-04 PROBLEM — N52.9 ED (ERECTILE DYSFUNCTION) OF ORGANIC ORIGIN: Status: ACTIVE | Noted: 2020-08-04

## 2020-08-04 PROBLEM — E11.52 TYPE 2 DIABETES MELLITUS WITH DIABETIC PERIPHERAL ANGIOPATHY WITH GANGRENE: Status: ACTIVE | Noted: 2020-08-04

## 2020-08-04 PROBLEM — E11.42 POLYNEUROPATHY DUE TO TYPE 2 DIABETES MELLITUS: Status: ACTIVE | Noted: 2020-08-04

## 2020-10-01 ENCOUNTER — OFFICE VISIT (OUTPATIENT)
Dept: CARDIOLOGY | Facility: CLINIC | Age: 60
End: 2020-10-01
Payer: COMMERCIAL

## 2020-10-01 VITALS
SYSTOLIC BLOOD PRESSURE: 130 MMHG | OXYGEN SATURATION: 96 % | WEIGHT: 220.56 LBS | HEIGHT: 76 IN | HEART RATE: 78 BPM | DIASTOLIC BLOOD PRESSURE: 78 MMHG | BODY MASS INDEX: 26.86 KG/M2

## 2020-10-01 DIAGNOSIS — I10 HYPERTENSION, UNSPECIFIED TYPE: ICD-10-CM

## 2020-10-01 DIAGNOSIS — I25.10 CORONARY ARTERY DISEASE, ANGINA PRESENCE UNSPECIFIED, UNSPECIFIED VESSEL OR LESION TYPE, UNSPECIFIED WHETHER NATIVE OR TRANSPLANTED HEART: Primary | ICD-10-CM

## 2020-10-01 DIAGNOSIS — Z95.820 S/P ANGIOPLASTY WITH STENT: Chronic | ICD-10-CM

## 2020-10-01 DIAGNOSIS — I73.9 PVD (PERIPHERAL VASCULAR DISEASE): ICD-10-CM

## 2020-10-01 DIAGNOSIS — E11.52 TYPE 2 DIABETES MELLITUS WITH DIABETIC PERIPHERAL ANGIOPATHY WITH GANGRENE, UNSPECIFIED WHETHER LONG TERM INSULIN USE: ICD-10-CM

## 2020-10-01 PROCEDURE — 99999 PR PBB SHADOW E&M-EST. PATIENT-LVL IV: ICD-10-PCS | Mod: PBBFAC,,, | Performed by: INTERNAL MEDICINE

## 2020-10-01 PROCEDURE — 3008F PR BODY MASS INDEX (BMI) DOCUMENTED: ICD-10-PCS | Mod: CPTII,S$GLB,, | Performed by: INTERNAL MEDICINE

## 2020-10-01 PROCEDURE — 3075F PR MOST RECENT SYSTOLIC BLOOD PRESS GE 130-139MM HG: ICD-10-PCS | Mod: CPTII,S$GLB,, | Performed by: INTERNAL MEDICINE

## 2020-10-01 PROCEDURE — 3008F BODY MASS INDEX DOCD: CPT | Mod: CPTII,S$GLB,, | Performed by: INTERNAL MEDICINE

## 2020-10-01 PROCEDURE — 3078F DIAST BP <80 MM HG: CPT | Mod: CPTII,S$GLB,, | Performed by: INTERNAL MEDICINE

## 2020-10-01 PROCEDURE — 99999 PR PBB SHADOW E&M-EST. PATIENT-LVL IV: CPT | Mod: PBBFAC,,, | Performed by: INTERNAL MEDICINE

## 2020-10-01 PROCEDURE — 99214 OFFICE O/P EST MOD 30 MIN: CPT | Mod: S$GLB,,, | Performed by: INTERNAL MEDICINE

## 2020-10-01 PROCEDURE — 3075F SYST BP GE 130 - 139MM HG: CPT | Mod: CPTII,S$GLB,, | Performed by: INTERNAL MEDICINE

## 2020-10-01 PROCEDURE — 99214 PR OFFICE/OUTPT VISIT, EST, LEVL IV, 30-39 MIN: ICD-10-PCS | Mod: S$GLB,,, | Performed by: INTERNAL MEDICINE

## 2020-10-01 PROCEDURE — 3078F PR MOST RECENT DIASTOLIC BLOOD PRESSURE < 80 MM HG: ICD-10-PCS | Mod: CPTII,S$GLB,, | Performed by: INTERNAL MEDICINE

## 2020-10-01 RX ORDER — ROSUVASTATIN CALCIUM 10 MG/1
10 TABLET, COATED ORAL DAILY
Qty: 90 TABLET | Refills: 3 | Status: SHIPPED | OUTPATIENT
Start: 2020-10-01 | End: 2021-10-30 | Stop reason: SDUPTHER

## 2020-10-01 RX ORDER — METOPROLOL SUCCINATE 25 MG/1
25 TABLET, EXTENDED RELEASE ORAL DAILY
Qty: 90 TABLET | Refills: 3 | Status: SHIPPED | OUTPATIENT
Start: 2020-10-01 | End: 2021-10-30 | Stop reason: SDUPTHER

## 2020-10-01 RX ORDER — ASPIRIN 81 MG/1
81 TABLET ORAL DAILY
Qty: 30 TABLET | Refills: 6 | Status: SHIPPED | OUTPATIENT
Start: 2020-10-01 | End: 2024-01-30

## 2020-10-01 NOTE — PROGRESS NOTES
Subjective:   Patient ID:  Santiago Lucia is a 59 y.o. male who presents for follow-up of cardiac clearance in the setting of known CAD s/p PCI in 2013    HPI:   Patient is here for follow up  He used to f/u with Dr. Rabago   Last visit was 2 year ago.   He stated that he is doing well.   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 No chest pain, no palpitations  He had chronic b/l Hip pain for which he takes naproxen for. Hx of hip arthroplasty for AVN.   He is not taking aspirin, not consistently taking his statin or Toprol.     He has a wound on the RT great Toe that he is following with Dr. Nicole for. The wound is slowly healing for since 2017.  He has palpable DP and PT by physical exam.     Unfortunately he lost his wife in 11/2019     U/S arterial 10/2017 Lower extremity extremity arterial Doppler evaluation appears within normal limits. Vasculature is patent without evidence of significant stenoses    [facility] 1/2013  1.      Mild Infero-basal and lateral hypokinesis with EF 45-50%   2.      CAD as described:   3.      LM normal   4.      LAD normal   5.      LCx 95% stenosis   6.      RCA dominant 95% culprit stenosis   7. Successful JACKIE to RCA  Stent Xience Rx 3.0 X 18 (JACKIE  8. Successful JACKIE to LCx  Stent Xience Rx 3.0 X 18         Echo 1/2013    1 - Normal left ventricular function (EF 55%).     2 - Diastolic dysfunction  Patient Active Problem List    Diagnosis Date Noted    ED (erectile dysfunction) of organic origin 08/04/2020    Pain in joint involving pelvic region and thigh 08/04/2020    Polyneuropathy due to type 2 diabetes mellitus 08/04/2020    Type 2 diabetes mellitus with diabetic peripheral angiopathy with gangrene 08/04/2020    Ingrowing toenail 01/09/2020    Avascular necrosis of bone of hip, left 10/30/2018    Coronary artery disease 11/10/2017    Infection of nail bed of toe of right foot 11/07/2017    PVD (peripheral vascular disease) 10/26/2017    Status post  right hip replacement 10/26/2017    S/P angioplasty with stent 11/08/2013    DM w/o complication type II 08/09/2013    NSTEMI (non-ST elevated myocardial infarction) 01/17/2013    HTN (hypertension) 01/17/2013    Dyslipidemia 01/17/2013    CAD (coronary artery disease) 01/17/2013       Patient's Medications   New Prescriptions    ASPIRIN (ECOTRIN) 81 MG EC TABLET    Take 1 tablet (81 mg total) by mouth once daily.   Previous Medications    ACETAMINOPHEN-CODEINE 300-30MG (TYLENOL #3) 300-30 MG TAB    acetaminophen 300 mg-codeine 30 mg tablet   TK 1 T PO  Q 4 TO 6 H PRN P    HYDROCODONE-ACETAMINOPHEN (NORCO) 7.5-325 MG PER TABLET    Take 1 tablet by mouth every 12 (twelve) hours as needed for Pain.    NAPROXEN SODIUM (ANAPROX) 550 MG TABLET    Take 1 tablet (550 mg total) by mouth 2 (two) times daily with meals.    TADALAFIL (CIALIS) 20 MG TAB       Modified Medications    Modified Medication Previous Medication    METOPROLOL SUCCINATE (TOPROL-XL) 25 MG 24 HR TABLET metoprolol succinate (TOPROL-XL) 25 MG 24 hr tablet       Take 1 tablet (25 mg total) by mouth once daily.    Take 1 tablet (25 mg total) by mouth once daily.    ROSUVASTATIN (CRESTOR) 10 MG TABLET rosuvastatin (CRESTOR) 10 MG tablet       Take 1 tablet (10 mg total) by mouth once daily.    Take 1 tablet (10 mg total) by mouth once daily.   Discontinued Medications    No medications on file         Review of Systems   Constitution: Negative for chills and fever.   HENT: Negative for hearing loss and nosebleeds.    Eyes: Negative for blurred vision.   Cardiovascular: Negative for chest pain, leg swelling and palpitations.   Respiratory: Negative for hemoptysis and shortness of breath.    Hematologic/Lymphatic: Negative for bleeding problem.   Skin: Negative for itching.        As in HPI   Musculoskeletal: Positive for arthritis and joint pain. Negative for falls.   Gastrointestinal: Negative for abdominal pain and hematochezia.   Genitourinary:  Negative for hematuria.   Neurological: Negative for dizziness and loss of balance.   Psychiatric/Behavioral: Negative for altered mental status and depression.         Objective:   Physical Exam   Constitutional: He is oriented to person, place, and time. He appears well-developed and well-nourished.   HENT:   Head: Normocephalic and atraumatic.   Eyes: Conjunctivae are normal.   Neck: Neck supple. No JVD present. Carotid bruit is not present.   Cardiovascular: Normal rate, regular rhythm and normal heart sounds. Exam reveals no gallop and no friction rub.   No murmur heard.  Pulses:       Carotid pulses are 2+ on the right side and 2+ on the left side.       Radial pulses are 2+ on the right side and 2+ on the left side.        Dorsalis pedis pulses are 1+ on the right side.        Posterior tibial pulses are 2+ on the right side.   Pulmonary/Chest: Effort normal and breath sounds normal. No stridor. No respiratory distress. He has no wheezes.   Neurological: He is alert and oriented to person, place, and time.   Skin: Skin is warm and dry.   Wound on the Rt great toe as in media    Psychiatric: He has a normal mood and affect. His behavior is normal.       Lab Results    Lab Results   Component Value Date     11/09/2017    K 4.0 11/09/2017     11/09/2017    CO2 24 11/09/2017    BUN 16 11/09/2017    CREATININE 0.9 11/09/2017     (H) 11/09/2017    HGBA1C 7.1 (H) 01/16/2013    MG 2.5 01/15/2013    AST 23 12/20/2013    ALT 45 (H) 12/20/2013    ALBUMIN 3.9 12/20/2013    PROT 7.3 12/20/2013    BILITOT 0.9 12/20/2013    WBC 16.28 (H) 11/09/2017    HGB 14.4 11/09/2017    HCT 44.9 11/09/2017    MCV 89 11/09/2017     11/09/2017    INR 1.0 01/16/2013    CHOL 187 12/20/2013    HDL 39 (L) 12/20/2013    LDLCALC 118.4 12/20/2013    TRIG 148 12/20/2013    BNP 38 01/15/2013       Lipid panel  Lab Results   Component Value Date    CHOL 187 12/20/2013     Lab Results   Component Value Date    HDL 39 (L)  12/20/2013     Lab Results   Component Value Date    LDLCALC 118.4 12/20/2013     Lab Results   Component Value Date    TRIG 148 12/20/2013          Cardiac Studies  Significant Imaging: Echocardiogram:   2D echo with color flow doppler:   Results for orders placed or performed in visit on 01/15/13   2D echo with color flow doppler   Result Value Ref Range    QEF 55        Assessment:     1. Coronary artery disease, angina presence unspecified, unspecified vessel or lesion type, unspecified whether native or transplanted heart    2. Hypertension, unspecified type    3. Type 2 diabetes mellitus with diabetic peripheral angiopathy with gangrene, unspecified whether long term insulin use    4. S/P angioplasty with stent    5. PVD (peripheral vascular disease)        Plan:     - Discussed about the improtance of medication compliance  - Refill statin and BB  - Start ASA 81  - Check RT LE U/S/ DP and PT pulses are palpable. ? TCOM in the future.   - Check lipid panel  - F/U with wound care.     -  I spent 5-10 minutes asking, assessing, assisting, arranging and advising heart healthy diet improvements. This included low-salt meals, portion control and health food alternatives. I also encourage 30 minutes of moderate exercise 3-4x a week.     Continue with current medical plan and lifestyle changes.  Return sooner for concerns or questions. If symptoms persist go to the ED    Orders Placed This Encounter   Procedures    US Lower Extremity Arteries Right     Standing Status:   Future     Standing Expiration Date:   10/1/2021    Lipid Panel     fasting     Standing Status:   Future     Standing Expiration Date:   10/1/2021       Follow up as scheduled. Return to clinic in 3 months   He expressed verbal understanding and agreed with the plan    Thank you for the opportunity to care for this patient. Will be available for questions if needed.

## 2020-10-01 NOTE — PATIENT INSTRUCTIONS
Aerobic Exercise for a Healthy Heart  Exercise is a lot more than an energy booster and a stress reliever. It also strengthens your heart muscle, lowers your blood pressure and cholesterol, and burns calories. It can also improve your resting muscle tone, and your mood.     Remember, some activity is better than none.    Choose an aerobic activity  Choose an activity that makes your heart and lungs work harder than they do when you rest or walk normally. This aerobic exercise can improve the way your heart and other muscles use oxygen. Make it fun by exercising with a friend and choosing an activity you enjoy. Here are some ideas:  · Walking  · Swimming  · Bicycling  · Stair climbing  · Dancing  · Jogging  · Gardening  Exercise regularly  If you havent been exercising regularly,  get your doctors OK first. Then start slowly.  Here are some tips:  · Begin exercising 3 times a week for 5 to 10 minutes at a time.  · When you feel comfortable, add a few minutes each session.  · Slowly build up to exercising 3 to 4 times each week. Each session should last for 40 minutes, on average, and involve moderate- to vigorous-intensity physical activity.  · If you have been given nitroglycerin, be sure to carry it when you exercise.  · If you get chest pain (angina) when youre exercising, stop what youre doing, take your nitroglycerin, and call your doctor.  Date Last Reviewed: 6/2/2016  © 0886-8717 [employer]. [address] [place] [id number]. All rights reserved. This information is not intended as a substitute for professional medical care. Always follow your healthcare professional's instructions.          Eating Heart-Healthy Foods  Eating has a big impact on your heart health. In fact, eating healthier can improve several of your heart risks at once. For instance, it helps you manage weight, cholesterol, and blood pressure. Here are ideas to help you make heart-healthy changes without giving up  all the foods and flavors you love.  Getting started  · Talk with your health care provider about eating plans, such as the DASH or Mediterranean diet. You may also be referred to a dietitian.  · Change a few things at a time. Give yourself time to get used to a few eating changes before adding more.  · Work to create a tasty, healthy eating plan that you can stick to for the rest of your life.    Goals for healthy eating  Below are some tips to improve your eating habits:  · Limit saturated fats and trans fats. Saturated fats raise your levels of cholesterol, so keep these fats to a minimum. They are found in foods such as fatty meats, whole milk, cheese, and palm and coconut oils. Avoid trans fats because they lower good cholesterol as well as raise bad cholesterol. Trans fats are most often found in processed foods.  · Reduce sodium (salt) intake. Eating too much salt may increase your blood pressure. Limit your sodium intake to 2,300 milligrams (mg) per day, or less if your health care provider recommends it. Dining out less often and eating fewer processed foods are two great ways to decrease the amount of salt you consume.  · Managing calories. A calorie is a unit of energy. Your body burns calories for fuel, but if you eat more calories than your body burns, the extras are stored as fat. Your health care provider can help you create a diet plan to manage your calories. This will likely include eating healthier foods as well as exercising regularly. To help you track your progress, keep a diary to record what you eat and how often you exercise.  Choose the right foods  Aim to make these foods staples of your diet. If you have diabetes, you may have different recommendations than what is listed here:  · Fruits and vegetable provide plenty of nutrients without a lot of calories. At meals, fill half your plate with these foods. Split the other half of your plate between whole grains and lean protein.  · Whole  grains are high in fiber and rich in vitamins and nutrients. Good choices include whole-wheat bread, pasta, and brown rice.  · Lean proteins give you nutrition with less fat. Good choices include fish, skinless chicken, and beans.  · Low-fat or nonfat dairy provides nutrients without a lot of fat. Try low-fat or nonfat milk, cheese, or yogurt.  · Healthy fats can be good for you in small amounts. These are unsaturated fats, such as olive oil, nuts, and fish. Try to have at least 2 servings per week of fatty fish such as salmon, sardines, mackerel, rainbow trout, and albacore tuna. These contain omega-3 fatty acids, which are good for your heart. Flaxseed is another source of a heart-healthy fat.  More on heart healthy eating    Read food labels  Healthy eating starts at the grocery store. Be sure to pay attention to food labels on packaged foods. Look for products that are high in fiber and protein, and low in saturated fat, cholesterol, and sodium. Avoid products that contain trans fat. And pay close attention to serving size. For instance, if you plan to eat two servings, double all the numbers on the label.  Prepare food right  A key part of healthy cooking is cutting down on added fat and salt. Look on the internet for lower-fat, lower-sodium recipes. Also, try these tips:  · Remove fat from meat and skin from poultry before cooking.  · Skim fat from the surface of soups and sauces.  · Broil, boil, bake, steam, grill, and microwave food without added fats.  · Choose ingredients that spice up your food without adding calories, fat, or sodium. Try these items: horseradish, hot sauce, lemon, mustard, nonfat salad dressings, and vinegar. For salt-free herbs and spices, try basil, cilantro, cinnamon, pepper, and rosemary.  Date Last Reviewed: 6/25/2015  © 4554-4689 [employer]. [address] [place] [address], [place] [address]. All rights reserved. This information is not intended as a substitute for  professional medical care. Always follow your healthcare professional's instructions.          How to Quit Smoking  Smoking is one of the hardest habits to break. About half of all people who have ever smoked have been able to quit. Most people who still smoke want to quit. Here are some of the best ways to stop smoking.    Keep trying  Most smokers make many attempts at quitting before they are successful. Its important not to give up.  Go cold turkey  Most former smokers quit cold turkey (all at once). Trying to cut back gradually doesn't seem to work as well, perhaps because it continues the smoking habit. Also, it is possible to inhale more while smoking fewer cigarettes. This results in the same amount of nicotine in your body.  Get support  Support programs can be a big help, especially for heavy smokers. These groups offer lectures, ways to change behavior, and peer support. Here are some ways to find a support program:  · Free national quitline: 800-QUIT-NOW (989-635-8579).  · Hospital quit-smoking programs.  · American Lung Association: (353.614.4095).  · American Cancer Society (982-211-9434).  Support at home is important too. Nonsmokers can offer praise and encouragement. If the smoker in your life finds it hard to quit, encourage them to keep trying.  Over-the-counter medicines  Nicotine replacement therapy may make quitting easier. Certain aids, such as the nicotine patch, gum, and lozenges, are available without a prescription. It is best to use these under a doctors care, though. The skin patch provides a steady supply of nicotine. Nicotine gum and lozenges give temporary bursts of low levels of nicotine. Both methods reduce the craving for cigarettes. Warning: If you have nausea, vomiting, dizziness, weakness, or a fast heartbeat, stop using these products and see your doctor.  Prescription medicines  After reviewing your smoking patterns and past attempts to quit, your doctor may offer a  "prescription medicine such as bupropion, varenicline, a nicotine inhaler, or nasal spray. Each has advantages and side effects. Your doctor can review these with you.  Health benefits of quitting  The benefits of quitting start right away and keep improving the longer you go without smoking. These benefits occur at any age.  So whether you are 17 or 70, quitting is a good decision. Some of the benefits include:  · 20 minutes: Blood pressure and pulse return to normal.  · 8 hours: Oxygen levels return to normal.  · 2 days: Ability to smell and taste begin to improve as damaged nerves regrow.  · 2 to 3 weeks: Circulation and lung function improve.  · 1 to 9 months: Coughing, congestion, and shortness of breath decrease; tiredness decreases.  · 1 year: Risk of heart attack decreases by half.  · 5 years: Risk of lung cancer decreases by half; risk of stroke becomes the same as a nonsmokers.  For more on how to quit smoking, try these online resources:   · Smokefree.gov  · "Clearing the Air" booklet from the National [employer] [place]: smokefree.gov/sites/default/files/pdf/clearing-the-air-accessible.pdf  Date Last Reviewed: 3/1/2017  © 9821-8892 [employer]. [address] [place] [address], [place] [address]. All rights reserved. This information is not intended as a substitute for professional medical care. Always follow your healthcare professional's instructions.          "

## 2020-10-09 ENCOUNTER — HOSPITAL ENCOUNTER (OUTPATIENT)
Dept: RADIOLOGY | Facility: HOSPITAL | Age: 60
Discharge: HOME | End: 2020-10-09
Attending: INTERNAL MEDICINE
Payer: COMMERCIAL

## 2020-10-09 DIAGNOSIS — I73.9 PVD (PERIPHERAL VASCULAR DISEASE): ICD-10-CM

## 2020-10-09 DIAGNOSIS — I25.10 CORONARY ARTERY DISEASE, ANGINA PRESENCE UNSPECIFIED, UNSPECIFIED VESSEL OR LESION TYPE, UNSPECIFIED WHETHER NATIVE OR TRANSPLANTED HEART: ICD-10-CM

## 2020-10-09 PROCEDURE — 93926 LOWER EXTREMITY STUDY: CPT | Mod: 26,RT,, | Performed by: RADIOLOGY

## 2020-10-09 PROCEDURE — 93926 US LOWER EXTREMITY ARTERIES RIGHT: ICD-10-PCS | Mod: 26,RT,, | Performed by: RADIOLOGY

## 2020-10-09 PROCEDURE — 93926 LOWER EXTREMITY STUDY: CPT | Mod: TC,RT

## 2020-10-09 RX ORDER — ROSUVASTATIN CALCIUM 10 MG/1
20 TABLET, COATED ORAL NIGHTLY
Qty: 180 TABLET | Refills: 3 | Status: SHIPPED | OUTPATIENT
Start: 2020-10-09 | End: 2021-05-11 | Stop reason: DRUGHIGH

## 2020-10-09 NOTE — PROGRESS NOTES
Maureen, please let him know The U/S is ok. It doesn't suggest major blockage. Will continue current medical tx and f/u with wound care. alos please let him know the Cholesterol needs to be better controlled. I would like to increase his Crestor to 20 mg.

## 2020-10-09 NOTE — PROGRESS NOTES
Please let him know The U/S is ok. It doesn't suggest major blockage. Will continue current medical tx and f/u with wound care.

## 2020-10-21 ENCOUNTER — HOSPITAL ENCOUNTER (OUTPATIENT)
Dept: WOUND CARE | Facility: HOSPITAL | Age: 60
Discharge: HOME | End: 2020-10-21
Attending: SURGERY
Payer: COMMERCIAL

## 2020-10-21 VITALS
TEMPERATURE: 97 F | HEIGHT: 76 IN | BODY MASS INDEX: 26.79 KG/M2 | DIASTOLIC BLOOD PRESSURE: 67 MMHG | WEIGHT: 220 LBS | SYSTOLIC BLOOD PRESSURE: 149 MMHG | HEART RATE: 68 BPM

## 2020-10-21 DIAGNOSIS — Z95.820 S/P ANGIOPLASTY WITH STENT: Chronic | ICD-10-CM

## 2020-10-21 DIAGNOSIS — L60.0 INGROWING TOENAIL: Primary | ICD-10-CM

## 2020-10-21 DIAGNOSIS — I10 HYPERTENSION, UNSPECIFIED TYPE: ICD-10-CM

## 2020-10-21 PROCEDURE — 11720 DEBRIDE NAIL 1-5: CPT | Performed by: SURGERY

## 2020-10-21 PROCEDURE — 97597 DBRDMT OPN WND 1ST 20 CM/<: CPT

## 2020-10-21 PROCEDURE — 11720 DEBRIDE NAIL 1-5: CPT | Mod: 59

## 2020-10-21 NOTE — PROGRESS NOTES
"Subjective:       Patient ID: Santiago Lucia is a 59 y.o. male.    Chief Complaint: Wound Care (right 1st toe)    04/01/2020: 58 yo male presents to wound clinic today for wound to right great toe. No open lesion noted at this time. Patient states, "I think my wound healed." Right great toe very dry and callused. Callus debrided per Dr. Nicole, still no open lesion noted. Moisturized right foot. Patient instructed to moisturize feet daily and return to wound care clinic for any new wound complications  10/21/20: Patient here was right great toe callous and ingrowing toenail. No open wound. No s/s of infection. Selective debridement of callous and toenail trim per Dr. Nicole. Bandaid to site. Follow-up with Dr. Nicole in his office in 3 months.    Review of Systems   Constitutional: Negative.    HENT: Negative.    Eyes: Negative.    Respiratory: Negative.    Cardiovascular: Negative.    Gastrointestinal: Negative.    Genitourinary: Negative.    Musculoskeletal: Negative.    Neurological: Negative.    Psychiatric/Behavioral: Negative.        Objective:      Physical Exam  Constitutional:       Appearance: He is well-developed.   HENT:      Head: Normocephalic.   Eyes:      Conjunctiva/sclera: Conjunctivae normal.      Pupils: Pupils are equal, round, and reactive to light.   Neck:      Musculoskeletal: Normal range of motion and neck supple.   Cardiovascular:      Rate and Rhythm: Normal rate and regular rhythm.      Heart sounds: Normal heart sounds.   Pulmonary:      Effort: Pulmonary effort is normal.      Breath sounds: Normal breath sounds.   Abdominal:      General: Bowel sounds are normal.      Palpations: Abdomen is soft.   Musculoskeletal: Normal range of motion.   Skin:     General: Skin is warm and dry.   Neurological:      Mental Status: He is alert and oriented to person, place, and time.      Deep Tendon Reflexes: Reflexes are normal and symmetric.         Assessment:       1. Ingrowing toenail  "          Wound 10/21/20 1500 Non pressure chronic ulcer Right Toe, first (Active)   10/21/20 1500    Pre-existing: Yes   Primary Wound Type: Non pressure   Side: Right   Orientation:    Location: Toe, first   Wound Number (optional):    Ankle-Brachial Index:    Pulses: doppler   Removal Indication and Assessment:    Wound Outcome:    (Retired) Wound Type:    (Retired) Wound Length (cm):    (Retired) Wound Width (cm):    (Retired) Depth (cm):    Wound Description (Comments):    Removal Indications:    Wound Image   10/21/20 1600   Dressing Appearance Open to air 10/21/20 1600   Drainage Amount None 10/21/20 1600   Appearance Closed/resurfaced;Dry;Pink 10/21/20 1600   Tissue loss description Not applicable 10/21/20 1600   Red (%), Wound Tissue Color 100 % 10/21/20 1600   Periwound Area Dry 10/21/20 1600   Wound Edges Callused;Rolled/closed 10/21/20 1600   Wound Length (cm) 0 cm 10/21/20 1600   Wound Width (cm) 0 cm 10/21/20 1600   Wound Depth (cm) 0 cm 10/21/20 1600   Wound Volume (cm^3) 0 cm^3 10/21/20 1600   Wound Surface Area (cm^2) 0 cm^2 10/21/20 1600   Care Cleansed with:;Sterile normal saline;Debrided 10/21/20 1600   Dressing Bandaid 10/21/20 1600   Periwound Care Dry periwound area maintained 10/21/20 1600   Dressing Change Due 10/22/20 10/21/20 1600      Callus debrided per Dr. Nicole. Bandaid to site.  Right foot moisturized    Plan:       With patient's verbal consent, nails were aggressively reduced and debrided x 1 to their soft tissue attachment mechanically and with electric , removing all offending nail and debris. Patient relates relief following the procedure. No anesthesia or hemostasis required. No blood loss.            Follow up in 3 months in Dr. Nicole's office.

## 2020-11-24 ENCOUNTER — HOSPITAL ENCOUNTER (OUTPATIENT)
Dept: RADIOLOGY | Facility: HOSPITAL | Age: 60
Discharge: HOME | End: 2020-11-24
Attending: STUDENT IN AN ORGANIZED HEALTH CARE EDUCATION/TRAINING PROGRAM
Payer: COMMERCIAL

## 2020-11-24 ENCOUNTER — OFFICE VISIT (OUTPATIENT)
Dept: PODIATRY | Facility: CLINIC | Age: 60
End: 2020-11-24
Payer: COMMERCIAL

## 2020-11-24 VITALS
BODY MASS INDEX: 26.79 KG/M2 | HEART RATE: 66 BPM | SYSTOLIC BLOOD PRESSURE: 159 MMHG | WEIGHT: 220 LBS | HEIGHT: 76 IN | DIASTOLIC BLOOD PRESSURE: 86 MMHG

## 2020-11-24 DIAGNOSIS — L84 CORN OR CALLUS: ICD-10-CM

## 2020-11-24 DIAGNOSIS — I73.9 PVD (PERIPHERAL VASCULAR DISEASE): Primary | ICD-10-CM

## 2020-11-24 DIAGNOSIS — E11.52 TYPE 2 DIABETES MELLITUS WITH DIABETIC PERIPHERAL ANGIOPATHY WITH GANGRENE, UNSPECIFIED WHETHER LONG TERM INSULIN USE: ICD-10-CM

## 2020-11-24 DIAGNOSIS — L84 PRE-ULCERATIVE CALLUSES: ICD-10-CM

## 2020-11-24 DIAGNOSIS — I73.9 PVD (PERIPHERAL VASCULAR DISEASE): ICD-10-CM

## 2020-11-24 PROCEDURE — 1125F PR PAIN SEVERITY QUANTIFIED, PAIN PRESENT: ICD-10-PCS | Mod: S$GLB,,, | Performed by: STUDENT IN AN ORGANIZED HEALTH CARE EDUCATION/TRAINING PROGRAM

## 2020-11-24 PROCEDURE — 3077F PR MOST RECENT SYSTOLIC BLOOD PRESSURE >= 140 MM HG: ICD-10-PCS | Mod: CPTII,S$GLB,, | Performed by: STUDENT IN AN ORGANIZED HEALTH CARE EDUCATION/TRAINING PROGRAM

## 2020-11-24 PROCEDURE — 99203 PR OFFICE/OUTPT VISIT, NEW, LEVL III, 30-44 MIN: ICD-10-PCS | Mod: S$GLB,,, | Performed by: STUDENT IN AN ORGANIZED HEALTH CARE EDUCATION/TRAINING PROGRAM

## 2020-11-24 PROCEDURE — 3079F PR MOST RECENT DIASTOLIC BLOOD PRESSURE 80-89 MM HG: ICD-10-PCS | Mod: CPTII,S$GLB,, | Performed by: STUDENT IN AN ORGANIZED HEALTH CARE EDUCATION/TRAINING PROGRAM

## 2020-11-24 PROCEDURE — 73630 X-RAY EXAM OF FOOT: CPT | Mod: 26,RT,, | Performed by: RADIOLOGY

## 2020-11-24 PROCEDURE — 73630 X-RAY EXAM OF FOOT: CPT | Mod: TC,PN,RT

## 2020-11-24 PROCEDURE — 3079F DIAST BP 80-89 MM HG: CPT | Mod: CPTII,S$GLB,, | Performed by: STUDENT IN AN ORGANIZED HEALTH CARE EDUCATION/TRAINING PROGRAM

## 2020-11-24 PROCEDURE — 99203 OFFICE O/P NEW LOW 30 MIN: CPT | Mod: S$GLB,,, | Performed by: STUDENT IN AN ORGANIZED HEALTH CARE EDUCATION/TRAINING PROGRAM

## 2020-11-24 PROCEDURE — 73630 XR FOOT COMPLETE 3 VIEW RIGHT: ICD-10-PCS | Mod: 26,RT,, | Performed by: RADIOLOGY

## 2020-11-24 PROCEDURE — 1125F AMNT PAIN NOTED PAIN PRSNT: CPT | Mod: S$GLB,,, | Performed by: STUDENT IN AN ORGANIZED HEALTH CARE EDUCATION/TRAINING PROGRAM

## 2020-11-24 PROCEDURE — 99999 PR PBB SHADOW E&M-EST. PATIENT-LVL III: ICD-10-PCS | Mod: PBBFAC,,, | Performed by: STUDENT IN AN ORGANIZED HEALTH CARE EDUCATION/TRAINING PROGRAM

## 2020-11-24 PROCEDURE — 99999 PR PBB SHADOW E&M-EST. PATIENT-LVL III: CPT | Mod: PBBFAC,,, | Performed by: STUDENT IN AN ORGANIZED HEALTH CARE EDUCATION/TRAINING PROGRAM

## 2020-11-24 PROCEDURE — 3077F SYST BP >= 140 MM HG: CPT | Mod: CPTII,S$GLB,, | Performed by: STUDENT IN AN ORGANIZED HEALTH CARE EDUCATION/TRAINING PROGRAM

## 2020-11-24 PROCEDURE — 3008F PR BODY MASS INDEX (BMI) DOCUMENTED: ICD-10-PCS | Mod: CPTII,S$GLB,, | Performed by: STUDENT IN AN ORGANIZED HEALTH CARE EDUCATION/TRAINING PROGRAM

## 2020-11-24 PROCEDURE — 3008F BODY MASS INDEX DOCD: CPT | Mod: CPTII,S$GLB,, | Performed by: STUDENT IN AN ORGANIZED HEALTH CARE EDUCATION/TRAINING PROGRAM

## 2020-11-24 RX ORDER — AMMONIUM LACTATE 12 G/100G
1 CREAM TOPICAL 2 TIMES DAILY
Qty: 1 BOTTLE | Refills: 4 | Status: SHIPPED | OUTPATIENT
Start: 2020-11-24 | End: 2022-01-11

## 2020-11-24 NOTE — PROGRESS NOTES
Subjective:      Patient ID: Santiago Lucia is a 59 y.o. male.    Chief Complaint: Toe Pain (right foot)    Santiago is a 59 y.o. male who presents to the clinic for evaluation and treatment of high risk feet. Santiago has a past medical history of Avascular necrosis of bone of hip, left (10/30/2018), Coronary artery disease, DM w/o complication type II (8/9/2013), and NSTEMI (non-ST elevated myocardial infarction) (01/2013). The patient's chief complaint is here for a callus to his right great toe. Pt states he is not currently treated for diabetes, per chart review has a history of PAD. Pt relates that he has seen Dr. Nicole in the past for ingrown toenail, with wound to right great toe since 2017. Pt seen today, denies open wounds or signs of infection. This patient has documented high risk feet requiring routine maintenance secondary to diabetes mellitis and those secondary complications of diabetes, as mentioned..     PCP: Yon Asif MD    Date Last Seen by PCP:     Current shoe gear:  Affected Foot: Casual shoes     Unaffected Foot: Casual shoes    Hemoglobin A1C   Date Value Ref Range Status   01/16/2013 7.1 (H) 4.0 - 6.2 % Final       Review of Systems   Constitution: Negative for chills, decreased appetite, diaphoresis and fever.   HENT: Negative for congestion and hearing loss.    Cardiovascular: Negative for chest pain, claudication, leg swelling and syncope.   Respiratory: Negative for cough and shortness of breath.    Skin: Positive for dry skin, nail changes, poor wound healing and suspicious lesions. Negative for color change, flushing, itching, rash and unusual hair distribution.   Musculoskeletal: Negative for back pain, joint pain, joint swelling and myalgias.   Gastrointestinal: Negative for nausea and vomiting.   Neurological: Positive for numbness. Negative for loss of balance, paresthesias and sensory change.   Psychiatric/Behavioral: Negative for altered mental status. The patient is not  nervous/anxious.            Objective:      Physical Exam  Constitutional:       General: He is not in acute distress.     Appearance: He is well-developed. He is not diaphoretic.   Cardiovascular:      Comments: Skin temperature is within normal limits. Toes are cool to touch and feet are warm proximally. Hair growth is diminished. Skin is mildly atrophic and with mild hyperpigmentation. Mild edema noted, tony.   Musculoskeletal:         General: Deformity present. No tenderness.      Right foot: Decreased range of motion. Deformity present.      Left foot: Decreased range of motion. Deformity present.      Comments: Adequate joint range of motion without pain, limitation, nor crepitation to bilateral feet and ankle joints. Muscle strength is 5/5 in all groups bilaterally.       Feet:      Right foot:      Skin integrity: Dry skin present. No blister, erythema or warmth.      Left foot:      Skin integrity: Dry skin present. No blister, erythema or warmth.   Lymphadenopathy:      Comments: Negative lymphadenopathy bilateral popliteal fossa and tarsal tunnel.    Skin:     General: Skin is warm and dry.      Coloration: Skin is not pale.      Findings: No abrasion, bruising, burn, erythema, laceration, petechiae or rash.      Nails: There is no clubbing.        Comments: Skin is warm and dry, bilaterally, no acute SOI noted, bilaterally, appears stable.     Nails are well trimmed and are thickened by 2-4 mm's, dystrophic, and are darkened in coloration with subungual fungal debris.     Hyperkeratosis noted to distal, dorsal and plantar right hallux, upon debridement with no underlying wound noted. Underlying tissue appears mildly macerated. No acute signs of infection noted.     Skin is dry, bilateral foot           Neurological:      Mental Status: He is alert and oriented to person, place, and time.      Sensory: Sensory deficit present.      Motor: No abnormal muscle tone.      Comments: [place]-Kj 5.07  monofilamant testing is diminished. Sharp/dull sensation is diminished bilaterally. Light touch is diminished bilaterally. Vibratory sensation is diminished tony   Psychiatric:         Behavior: Behavior normal.         Thought Content: Thought content normal.         Judgment: Judgment normal.               Assessment:       Encounter Diagnoses   Name Primary?    PVD (peripheral vascular disease) Yes    Type 2 diabetes mellitus with diabetic peripheral angiopathy with gangrene, unspecified whether long term insulin use     Corn or callus     Pre-ulcerative calluses          Plan:       Santiago was seen today for toe pain.    Diagnoses and all orders for this visit:    PVD (peripheral vascular disease)  -     X-Ray Foot Complete Right; Future  -     Wound Debridement    Type 2 diabetes mellitus with diabetic peripheral angiopathy with gangrene, unspecified whether long term insulin use  -     X-Ray Foot Complete Right; Future  -     Wound Debridement    Corn or callus  -     X-Ray Foot Complete Right; Future  -     Wound Debridement    Pre-ulcerative calluses    Other orders  -     ammonium lactate 12 % Crea; Apply 1 g topically 2 (two) times a day.      I counseled the patient on his conditions, their implications and medical management.    Pre-ulcerative callus debrided, upon debridement no open wounds noted, however tissue appears macerated, recommend painting great toe with betadine 2x per day and keeping foot dry, do not soak foot.     Xray ordered    Shoe inspection. Diabetic Foot Education. Patient reminded of the importance of good nutrition and blood sugar control to help prevent podiatric complications of diabetes. Patient instructed on proper foot hygeine. We discussed wearing proper shoe gear, daily foot inspections, never walking without protective shoe gear, never putting sharp instruments to feet, routine podiatric nail visits      Discussed general foot care:  Wear comfortable, proper fitting  shoes. Wash feet daily. Dry well. After drying, apply moisturizer to feet (no lotion to webspaces). Inspect feet daily for skin breaks, blisters, swelling, or redness. Wear cotton socks (preferably white)  Change socks every day. Do NOT walk barefoot. Do NOT use heating pads or warm/hot water soaks. I discussed with the  patient  signs and symptoms of infection including redness, drainage, purulence, odor, pain, elevated BS, streaking, fever, chills, etc . Patient is to seek medical attention (ER or urgent care) if these symptoms occur    F/u in 1-2 weeks, sooner PRN

## 2020-11-27 NOTE — PROCEDURES
Wound Debridement    Date/Time: 11/24/2020 1:30 PM  Performed by: Jackie Calderon DPM  Authorized by: Jackie Calderon DPM     Consent Done?:  Yes (Verbal)  Local anesthesia used?: No      Wound Details:    Location:  Right foot    Location:  Right 1st Toe    Type of Debridement:  Excisional       Length (cm):  0       Area (sq cm):  0       Width (cm):  0       Percent Debrided (%):  100       Depth (cm):  0       Total Area Debrided (sq cm):  0    Depth of debridement:  Epidermis/Dermis    Tissue debrided:  Epidermis    Devitalized tissue debrided:  Callus    Instruments:  Curette and Blade    Bleeding:  None  Patient tolerance:  Patient tolerated the procedure well with no immediate complications

## 2020-12-09 ENCOUNTER — OFFICE VISIT (OUTPATIENT)
Dept: PODIATRY | Facility: CLINIC | Age: 60
End: 2020-12-09
Payer: COMMERCIAL

## 2020-12-09 VITALS
WEIGHT: 220 LBS | HEART RATE: 69 BPM | HEIGHT: 76 IN | DIASTOLIC BLOOD PRESSURE: 83 MMHG | SYSTOLIC BLOOD PRESSURE: 131 MMHG | BODY MASS INDEX: 26.79 KG/M2

## 2020-12-09 DIAGNOSIS — L84 CORN OR CALLUS: ICD-10-CM

## 2020-12-09 DIAGNOSIS — L97.509 ULCER OF TOE, UNSPECIFIED LATERALITY, UNSPECIFIED ULCER STAGE: Primary | ICD-10-CM

## 2020-12-09 DIAGNOSIS — I73.9 PVD (PERIPHERAL VASCULAR DISEASE): ICD-10-CM

## 2020-12-09 DIAGNOSIS — E11.52 TYPE 2 DIABETES MELLITUS WITH DIABETIC PERIPHERAL ANGIOPATHY WITH GANGRENE, UNSPECIFIED WHETHER LONG TERM INSULIN USE: ICD-10-CM

## 2020-12-09 PROCEDURE — 11042 WOUND DEBRIDEMENT: ICD-10-PCS | Mod: S$GLB,,, | Performed by: STUDENT IN AN ORGANIZED HEALTH CARE EDUCATION/TRAINING PROGRAM

## 2020-12-09 PROCEDURE — 99999 PR PBB SHADOW E&M-EST. PATIENT-LVL III: CPT | Mod: PBBFAC,,, | Performed by: STUDENT IN AN ORGANIZED HEALTH CARE EDUCATION/TRAINING PROGRAM

## 2020-12-09 PROCEDURE — 3008F BODY MASS INDEX DOCD: CPT | Mod: CPTII,S$GLB,, | Performed by: STUDENT IN AN ORGANIZED HEALTH CARE EDUCATION/TRAINING PROGRAM

## 2020-12-09 PROCEDURE — 99999 PR PBB SHADOW E&M-EST. PATIENT-LVL III: ICD-10-PCS | Mod: PBBFAC,,, | Performed by: STUDENT IN AN ORGANIZED HEALTH CARE EDUCATION/TRAINING PROGRAM

## 2020-12-09 PROCEDURE — 3008F PR BODY MASS INDEX (BMI) DOCUMENTED: ICD-10-PCS | Mod: CPTII,S$GLB,, | Performed by: STUDENT IN AN ORGANIZED HEALTH CARE EDUCATION/TRAINING PROGRAM

## 2020-12-09 PROCEDURE — 99499 UNLISTED E&M SERVICE: CPT | Mod: S$GLB,,, | Performed by: STUDENT IN AN ORGANIZED HEALTH CARE EDUCATION/TRAINING PROGRAM

## 2020-12-09 PROCEDURE — 1126F PR PAIN SEVERITY QUANTIFIED, NO PAIN PRESENT: ICD-10-PCS | Mod: S$GLB,,, | Performed by: STUDENT IN AN ORGANIZED HEALTH CARE EDUCATION/TRAINING PROGRAM

## 2020-12-09 PROCEDURE — 11042 DBRDMT SUBQ TIS 1ST 20SQCM/<: CPT | Mod: S$GLB,,, | Performed by: STUDENT IN AN ORGANIZED HEALTH CARE EDUCATION/TRAINING PROGRAM

## 2020-12-09 PROCEDURE — 99499 NO LOS: ICD-10-PCS | Mod: S$GLB,,, | Performed by: STUDENT IN AN ORGANIZED HEALTH CARE EDUCATION/TRAINING PROGRAM

## 2020-12-09 PROCEDURE — 1126F AMNT PAIN NOTED NONE PRSNT: CPT | Mod: S$GLB,,, | Performed by: STUDENT IN AN ORGANIZED HEALTH CARE EDUCATION/TRAINING PROGRAM

## 2020-12-09 NOTE — PROGRESS NOTES
Subjective:      Patient ID: Santiago Lucia is a 60 y.o. male.    Chief Complaint: Follow-up (2 weeks )    Santiago is a 60 y.o. male who presents to the clinic for evaluation and treatment of high risk feet. Santiago has a past medical history of Avascular necrosis of bone of hip, left (10/30/2018), Coronary artery disease, DM w/o complication type II (8/9/2013), and NSTEMI (non-ST elevated myocardial infarction) (01/2013). The patient's chief complaint is here for a callus to his right great toe. Pt states he is not currently treated for diabetes, per chart review has a history of PAD. Pt relates that he has seen Dr. Nicole in the past for ingrown toenail, with wound to right great toe since 2017. Pt seen today, denies open wounds or signs of infection. This patient has documented high risk feet requiring routine maintenance secondary to diabetes mellitis and those secondary complications of diabetes, as mentioned..     12/9/20: Pt seen today for pre-ulcerative callus to right great toe. No new pedal complaints at this time.     PCP: Yon Asif MD    Date Last Seen by PCP:     Current shoe gear:  Affected Foot: Casual shoes     Unaffected Foot: Casual shoes    Hemoglobin A1C   Date Value Ref Range Status   01/16/2013 7.1 (H) 4.0 - 6.2 % Final       Review of Systems   Constitution: Negative for chills, decreased appetite, diaphoresis and fever.   HENT: Negative for congestion and hearing loss.    Cardiovascular: Negative for chest pain, claudication, leg swelling and syncope.   Respiratory: Negative for cough and shortness of breath.    Skin: Positive for dry skin, nail changes, poor wound healing and suspicious lesions. Negative for color change, flushing, itching, rash and unusual hair distribution.   Musculoskeletal: Negative for back pain, joint pain, joint swelling and myalgias.   Gastrointestinal: Negative for nausea and vomiting.   Neurological: Positive for numbness. Negative for loss of balance,  paresthesias and sensory change.   Psychiatric/Behavioral: Negative for altered mental status. The patient is not nervous/anxious.            Objective:      Physical Exam  Constitutional:       General: He is not in acute distress.     Appearance: He is well-developed. He is not diaphoretic.   Cardiovascular:      Comments: Skin temperature is within normal limits. Toes are cool to touch and feet are warm proximally. Hair growth is diminished. Skin is mildly atrophic and with mild hyperpigmentation. Mild edema noted, tony.   Musculoskeletal:         General: Deformity present. No tenderness.      Right foot: Decreased range of motion. Deformity present.      Left foot: Decreased range of motion. Deformity present.      Comments: Adequate joint range of motion without pain, limitation, nor crepitation to bilateral feet and ankle joints. Muscle strength is 5/5 in all groups bilaterally.       Feet:      Right foot:      Skin integrity: Dry skin present. No blister, erythema or warmth.      Left foot:      Skin integrity: Dry skin present. No blister, erythema or warmth.   Lymphadenopathy:      Comments: Negative lymphadenopathy bilateral popliteal fossa and tarsal tunnel.    Skin:     General: Skin is warm and dry.      Coloration: Skin is not pale.      Findings: No abrasion, bruising, burn, erythema, laceration, petechiae or rash.      Nails: There is no clubbing.        Comments: Skin is warm and dry, bilaterally, no acute SOI noted, bilaterally, appears stable.     Nails are well trimmed and are thickened by 2-4 mm's, dystrophic, and are darkened in coloration with subungual fungal debris.     Hyperkeratosis noted to distal, dorsal and plantar right hallux, upon debridement with wound measuring 0.8x0.4x0.5cm with granular base and hyperkeratotic wound edges, no acute signs of infection, does not probe to bone, mild serosanguinous drainage, appears stable.     Skin is dry, bilateral foot           Neurological:       Mental Status: He is alert and oriented to person, place, and time.      Sensory: Sensory deficit present.      Motor: No abnormal muscle tone.      Comments: [place]-Kj 5.07 monofilamant testing is diminished. Sharp/dull sensation is diminished bilaterally. Light touch is diminished bilaterally. Vibratory sensation is diminished tony   Psychiatric:         Behavior: Behavior normal.         Thought Content: Thought content normal.         Judgment: Judgment normal.       Description per above              Assessment:       Encounter Diagnoses   Name Primary?    PVD (peripheral vascular disease)     Type 2 diabetes mellitus with diabetic peripheral angiopathy with gangrene, unspecified whether long term insulin use     Corn or callus     Ulcer of toe, unspecified laterality, unspecified ulcer stage Yes         Plan:       Santiago was seen today for follow-up.    Diagnoses and all orders for this visit:    Ulcer of toe, unspecified laterality, unspecified ulcer stage    PVD (peripheral vascular disease)  -     Wound Debridement    Type 2 diabetes mellitus with diabetic peripheral angiopathy with gangrene, unspecified whether long term insulin use    Corn or callus  -     Wound Debridement      I counseled the patient on his conditions, their implications and medical management.    Xray reviewed     Wound debridement performed, see procedure note. Wound dressed with dharmesh, matthew foam, cast padding and secured in flexinet with DARCO shoe. Pt to keep dressings clean, dry, and intact until follow up in 1 week.    Recommend follow up with Interventional Cardiology    Shoe inspection. Diabetic Foot Education. Patient reminded of the importance of good nutrition and blood sugar control to help prevent podiatric complications of diabetes. Patient instructed on proper foot hygeine. We discussed wearing proper shoe gear, daily foot inspections, never walking without protective shoe gear, never putting sharp  instruments to feet, routine podiatric nail visits      Discussed general foot care:  Wear comfortable, proper fitting shoes. Wash feet daily. Dry well. After drying, apply moisturizer to feet (no lotion to webspaces). Inspect feet daily for skin breaks, blisters, swelling, or redness. Wear cotton socks (preferably white)  Change socks every day. Do NOT walk barefoot. Do NOT use heating pads or warm/hot water soaks. I discussed with the  patient  signs and symptoms of infection including redness, drainage, purulence, odor, pain, elevated BS, streaking, fever, chills, etc . Patient is to seek medical attention (ER or urgent care) if these symptoms occur    F/u in 1-2 weeks, sooner PRN

## 2020-12-09 NOTE — PROCEDURES
Wound Debridement    Date/Time: 12/9/2020 3:15 PM  Performed by: Jackie Calderon DPM  Authorized by: Jackie Calderon DPM     Consent Done?:  Yes (Verbal)  Local anesthesia used?: No      Wound Details:    Location:  Right foot    Location:  Right 1st Toe    Type of Debridement:  Excisional       Length (cm):  0.8       Area (sq cm):  0.4       Width (cm):  0.5       Percent Debrided (%):  100       Depth (cm):  0.4       Total Area Debrided (sq cm):  0.4    Depth of debridement:  Subcutaneous tissue    Tissue debrided:  Subcutaneous and Other    Devitalized tissue debrided:  Biofilm and Callus    Instruments:  Blade and Curette    Bleeding:  Minimal  Patient tolerance:  Patient tolerated the procedure well with no immediate complications

## 2020-12-11 ENCOUNTER — TELEPHONE (OUTPATIENT)
Dept: PODIATRY | Facility: CLINIC | Age: 60
End: 2020-12-11

## 2020-12-11 NOTE — TELEPHONE ENCOUNTER
----- Message from Gillian Escobar MA sent at 2020  4:12 PM CST -----  Regardin week follow up  Hey he needs a 1 week follow up but she doesn't have anything in a week so I'm not sure what the plan is with his appointment. Please advise.

## 2020-12-16 ENCOUNTER — OFFICE VISIT (OUTPATIENT)
Dept: PODIATRY | Facility: CLINIC | Age: 60
End: 2020-12-16
Payer: COMMERCIAL

## 2020-12-16 VITALS
HEART RATE: 68 BPM | HEIGHT: 76 IN | DIASTOLIC BLOOD PRESSURE: 75 MMHG | BODY MASS INDEX: 26.79 KG/M2 | WEIGHT: 220 LBS | SYSTOLIC BLOOD PRESSURE: 127 MMHG

## 2020-12-16 DIAGNOSIS — L97.509 ULCER OF TOE, UNSPECIFIED LATERALITY, UNSPECIFIED ULCER STAGE: Primary | ICD-10-CM

## 2020-12-16 DIAGNOSIS — E11.52 TYPE 2 DIABETES MELLITUS WITH DIABETIC PERIPHERAL ANGIOPATHY WITH GANGRENE, UNSPECIFIED WHETHER LONG TERM INSULIN USE: ICD-10-CM

## 2020-12-16 PROCEDURE — 3008F BODY MASS INDEX DOCD: CPT | Mod: CPTII,S$GLB,, | Performed by: STUDENT IN AN ORGANIZED HEALTH CARE EDUCATION/TRAINING PROGRAM

## 2020-12-16 PROCEDURE — 99999 PR PBB SHADOW E&M-EST. PATIENT-LVL III: ICD-10-PCS | Mod: PBBFAC,,, | Performed by: STUDENT IN AN ORGANIZED HEALTH CARE EDUCATION/TRAINING PROGRAM

## 2020-12-16 PROCEDURE — 1125F PR PAIN SEVERITY QUANTIFIED, PAIN PRESENT: ICD-10-PCS | Mod: S$GLB,,, | Performed by: STUDENT IN AN ORGANIZED HEALTH CARE EDUCATION/TRAINING PROGRAM

## 2020-12-16 PROCEDURE — 3008F PR BODY MASS INDEX (BMI) DOCUMENTED: ICD-10-PCS | Mod: CPTII,S$GLB,, | Performed by: STUDENT IN AN ORGANIZED HEALTH CARE EDUCATION/TRAINING PROGRAM

## 2020-12-16 PROCEDURE — 3074F SYST BP LT 130 MM HG: CPT | Mod: CPTII,S$GLB,, | Performed by: STUDENT IN AN ORGANIZED HEALTH CARE EDUCATION/TRAINING PROGRAM

## 2020-12-16 PROCEDURE — 1125F AMNT PAIN NOTED PAIN PRSNT: CPT | Mod: S$GLB,,, | Performed by: STUDENT IN AN ORGANIZED HEALTH CARE EDUCATION/TRAINING PROGRAM

## 2020-12-16 PROCEDURE — 3074F PR MOST RECENT SYSTOLIC BLOOD PRESSURE < 130 MM HG: ICD-10-PCS | Mod: CPTII,S$GLB,, | Performed by: STUDENT IN AN ORGANIZED HEALTH CARE EDUCATION/TRAINING PROGRAM

## 2020-12-16 PROCEDURE — 99214 OFFICE O/P EST MOD 30 MIN: CPT | Mod: 25,S$GLB,, | Performed by: STUDENT IN AN ORGANIZED HEALTH CARE EDUCATION/TRAINING PROGRAM

## 2020-12-16 PROCEDURE — 11042 WOUND DEBRIDEMENT: ICD-10-PCS | Mod: S$GLB,,, | Performed by: STUDENT IN AN ORGANIZED HEALTH CARE EDUCATION/TRAINING PROGRAM

## 2020-12-16 PROCEDURE — 99999 PR PBB SHADOW E&M-EST. PATIENT-LVL III: CPT | Mod: PBBFAC,,, | Performed by: STUDENT IN AN ORGANIZED HEALTH CARE EDUCATION/TRAINING PROGRAM

## 2020-12-16 PROCEDURE — 99214 PR OFFICE/OUTPT VISIT, EST, LEVL IV, 30-39 MIN: ICD-10-PCS | Mod: 25,S$GLB,, | Performed by: STUDENT IN AN ORGANIZED HEALTH CARE EDUCATION/TRAINING PROGRAM

## 2020-12-16 PROCEDURE — 3078F DIAST BP <80 MM HG: CPT | Mod: CPTII,S$GLB,, | Performed by: STUDENT IN AN ORGANIZED HEALTH CARE EDUCATION/TRAINING PROGRAM

## 2020-12-16 PROCEDURE — 11042 DBRDMT SUBQ TIS 1ST 20SQCM/<: CPT | Mod: S$GLB,,, | Performed by: STUDENT IN AN ORGANIZED HEALTH CARE EDUCATION/TRAINING PROGRAM

## 2020-12-16 PROCEDURE — 3078F PR MOST RECENT DIASTOLIC BLOOD PRESSURE < 80 MM HG: ICD-10-PCS | Mod: CPTII,S$GLB,, | Performed by: STUDENT IN AN ORGANIZED HEALTH CARE EDUCATION/TRAINING PROGRAM

## 2020-12-16 NOTE — PROGRESS NOTES
Subjective:      Patient ID: Santiago Lucia is a 60 y.o. male.    Chief Complaint: Diabetic Foot Ulcer    Santiago is a 60 y.o. male who presents to the clinic for evaluation and treatment of high risk feet. Santiago has a past medical history of Avascular necrosis of bone of hip, left (10/30/2018), Coronary artery disease, DM w/o complication type II (8/9/2013), and NSTEMI (non-ST elevated myocardial infarction) (01/2013). The patient's chief complaint is here for a callus to his right great toe. Pt states he is not currently treated for diabetes, per chart review has a history of PAD. Pt relates that he has seen Dr. Nicole in the past for ingrown toenail, with wound to right great toe since 2017. Pt seen today, denies open wounds or signs of infection. This patient has documented high risk feet requiring routine maintenance secondary to diabetes mellitis and those secondary complications of diabetes, as mentioned..     12/9/20: Pt seen today for pre-ulcerative callus to right great toe. No new pedal complaints at this time.     12/16/20: Pt seen today for follow up for right great to ulcer, denies signs of infection, no other pedal complaints.     PCP: Yon Asif MD    Date Last Seen by PCP:     Current shoe gear:  Affected Foot: Casual shoes     Unaffected Foot: Casual shoes    Hemoglobin A1C   Date Value Ref Range Status   01/16/2013 7.1 (H) 4.0 - 6.2 % Final       Review of Systems   Constitution: Negative for chills, decreased appetite, diaphoresis and fever.   HENT: Negative for congestion and hearing loss.    Cardiovascular: Negative for chest pain, claudication, leg swelling and syncope.   Respiratory: Negative for cough and shortness of breath.    Skin: Positive for dry skin, nail changes, poor wound healing and suspicious lesions. Negative for color change, flushing, itching, rash and unusual hair distribution.   Musculoskeletal: Negative for back pain, joint pain, joint swelling and myalgias.    Gastrointestinal: Negative for nausea and vomiting.   Neurological: Positive for numbness. Negative for loss of balance, paresthesias and sensory change.   Psychiatric/Behavioral: Negative for altered mental status. The patient is not nervous/anxious.            Objective:      Physical Exam  Constitutional:       General: He is not in acute distress.     Appearance: He is well-developed. He is not diaphoretic.   Cardiovascular:      Comments: Skin temperature is within normal limits. Toes are cool to touch and feet are warm proximally. Hair growth is diminished. Skin is mildly atrophic and with mild hyperpigmentation. Mild edema noted, tony.   Musculoskeletal:         General: Deformity present. No tenderness.      Right foot: Decreased range of motion. Deformity present.      Left foot: Decreased range of motion. Deformity present.      Comments: Adequate joint range of motion without pain, limitation, nor crepitation to bilateral feet and ankle joints. Muscle strength is 5/5 in all groups bilaterally.       Feet:      Right foot:      Skin integrity: Dry skin present. No blister, erythema or warmth.      Left foot:      Skin integrity: Dry skin present. No blister, erythema or warmth.   Lymphadenopathy:      Comments: Negative lymphadenopathy bilateral popliteal fossa and tarsal tunnel.    Skin:     General: Skin is warm and dry.      Coloration: Skin is not pale.      Findings: No abrasion, bruising, burn, erythema, laceration, petechiae or rash.      Nails: There is no clubbing.        Comments: Skin is warm and dry, bilaterally, no acute SOI noted, bilaterally, appears stable.     Nails are well trimmed and are thickened by 2-4 mm's, dystrophic, and are darkened in coloration with subungual fungal debris.     Hyperkeratosis noted to distal, dorsal and plantar right hallux, upon debridement with wound measuring 0.6x0.8x0.4cm with granular base and hyperkeratotic wound edges, no acute signs of infection, does  not probe to bone, mild serosanguinous drainage, appears stable.     Skin is dry, bilateral foot           Neurological:      Mental Status: He is alert and oriented to person, place, and time.      Sensory: Sensory deficit present.      Motor: No abnormal muscle tone.      Comments: [place]-Kj 5.07 monofilamant testing is diminished. Sharp/dull sensation is diminished bilaterally. Light touch is diminished bilaterally. Vibratory sensation is diminished tony   Psychiatric:         Behavior: Behavior normal.         Thought Content: Thought content normal.         Judgment: Judgment normal.       Description per above    12/26/20:          12/9/20:              Assessment:       Encounter Diagnoses   Name Primary?    Ulcer of toe, unspecified laterality, unspecified ulcer stage Yes    Type 2 diabetes mellitus with diabetic peripheral angiopathy with gangrene, unspecified whether long term insulin use          Plan:       Santiago was seen today for diabetic foot ulcer.    Diagnoses and all orders for this visit:    Ulcer of toe, unspecified laterality, unspecified ulcer stage  -     Wound Debridement    Type 2 diabetes mellitus with diabetic peripheral angiopathy with gangrene, unspecified whether long term insulin use  -     Wound Debridement      I counseled the patient on his conditions, their implications and medical management.    Xray reviewed     Wound debridement performed, see procedure note. Wound dressed with dharmesh, matthew foam, cast padding and secured in flexinet with DAR[employer] shoe. Pt to keep dressings clean, dry, and intact until follow up in 1 week.    Recommend follow up with Interventional Cardiology, will consider TCOMs    Shoe inspection. Diabetic Foot Education. Patient reminded of the importance of good nutrition and blood sugar control to help prevent podiatric complications of diabetes. Patient instructed on proper foot hygeine. We discussed wearing proper shoe gear, daily foot  inspections, never walking without protective shoe gear, never putting sharp instruments to feet, routine podiatric nail visits      Discussed general foot care:  Wear comfortable, proper fitting shoes. Wash feet daily. Dry well. After drying, apply moisturizer to feet (no lotion to webspaces). Inspect feet daily for skin breaks, blisters, swelling, or redness. Wear cotton socks (preferably white)  Change socks every day. Do NOT walk barefoot. Do NOT use heating pads or warm/hot water soaks. I discussed with the  patient  signs and symptoms of infection including redness, drainage, purulence, odor, pain, elevated BS, streaking, fever, chills, etc . Patient is to seek medical attention (ER or urgent care) if these symptoms occur    F/u in 1-2 weeks, sooner PRN

## 2020-12-17 ENCOUNTER — TELEPHONE (OUTPATIENT)
Dept: CARDIOLOGY | Facility: CLINIC | Age: 60
End: 2020-12-17

## 2020-12-17 NOTE — TELEPHONE ENCOUNTER
Reached out in regards to arranging for Dr Velasquez to see during his next WC visit.     Reports at this time he has an appt to see Dr Calderon in Podiatry and no plan for wound care.    Asking if Dr Velasquez can see while during Podiatry visit.

## 2020-12-17 NOTE — PROCEDURES
Wound Debridement    Date/Time: 12/16/2020 3:15 PM  Performed by: Jackie Calderon DPM  Authorized by: Jackie Calderon DPM     Consent Done?:  Yes (Verbal)  Local anesthesia used?: No      Wound Details:    Location:  Right foot    Location:  Right 1st Toe    Type of Debridement:  Excisional       Length (cm):  0.6       Area (sq cm):  0.48       Width (cm):  0.8       Percent Debrided (%):  100       Depth (cm):  0.4       Total Area Debrided (sq cm):  0.48    Depth of debridement:  Subcutaneous tissue    Tissue debrided:  Subcutaneous and Other    Devitalized tissue debrided:  Biofilm, Callus and Fibrin    Instruments:  Blade and Curette    Bleeding:  Minimal  Patient tolerance:  Patient tolerated the procedure well with no immediate complications

## 2020-12-17 NOTE — TELEPHONE ENCOUNTER
----- Message from Mert Velasquez MD sent at 12/17/2020  9:54 AM CST -----  Please arrange a follow up during next wound care visit. Thanks

## 2020-12-23 ENCOUNTER — OFFICE VISIT (OUTPATIENT)
Dept: PODIATRY | Facility: CLINIC | Age: 60
End: 2020-12-23
Payer: COMMERCIAL

## 2020-12-23 ENCOUNTER — HOSPITAL ENCOUNTER (OUTPATIENT)
Dept: WOUND CARE | Facility: HOSPITAL | Age: 60
Discharge: HOME | End: 2020-12-23
Attending: SURGERY
Payer: COMMERCIAL

## 2020-12-23 VITALS
BODY MASS INDEX: 26.79 KG/M2 | DIASTOLIC BLOOD PRESSURE: 94 MMHG | HEIGHT: 76 IN | WEIGHT: 220 LBS | HEART RATE: 61 BPM | SYSTOLIC BLOOD PRESSURE: 165 MMHG

## 2020-12-23 DIAGNOSIS — E11.52 TYPE 2 DIABETES MELLITUS WITH DIABETIC PERIPHERAL ANGIOPATHY WITH GANGRENE, UNSPECIFIED WHETHER LONG TERM INSULIN USE: ICD-10-CM

## 2020-12-23 DIAGNOSIS — L97.509 ULCER OF TOE, UNSPECIFIED LATERALITY, UNSPECIFIED ULCER STAGE: Primary | ICD-10-CM

## 2020-12-23 DIAGNOSIS — I73.9 PVD (PERIPHERAL VASCULAR DISEASE): ICD-10-CM

## 2020-12-23 PROCEDURE — 99214 OFFICE O/P EST MOD 30 MIN: CPT | Mod: 25,S$GLB,, | Performed by: STUDENT IN AN ORGANIZED HEALTH CARE EDUCATION/TRAINING PROGRAM

## 2020-12-23 PROCEDURE — 99999 PR PBB SHADOW E&M-EST. PATIENT-LVL III: CPT | Mod: PBBFAC,,, | Performed by: STUDENT IN AN ORGANIZED HEALTH CARE EDUCATION/TRAINING PROGRAM

## 2020-12-23 PROCEDURE — 11042 DBRDMT SUBQ TIS 1ST 20SQCM/<: CPT | Mod: S$GLB,,, | Performed by: STUDENT IN AN ORGANIZED HEALTH CARE EDUCATION/TRAINING PROGRAM

## 2020-12-23 PROCEDURE — 99999 PR PBB SHADOW E&M-EST. PATIENT-LVL III: ICD-10-PCS | Mod: PBBFAC,,, | Performed by: STUDENT IN AN ORGANIZED HEALTH CARE EDUCATION/TRAINING PROGRAM

## 2020-12-23 PROCEDURE — 3080F PR MOST RECENT DIASTOLIC BLOOD PRESSURE >= 90 MM HG: ICD-10-PCS | Mod: CPTII,S$GLB,, | Performed by: STUDENT IN AN ORGANIZED HEALTH CARE EDUCATION/TRAINING PROGRAM

## 2020-12-23 PROCEDURE — 11042 WOUND DEBRIDEMENT: ICD-10-PCS | Mod: S$GLB,,, | Performed by: STUDENT IN AN ORGANIZED HEALTH CARE EDUCATION/TRAINING PROGRAM

## 2020-12-23 PROCEDURE — 3008F PR BODY MASS INDEX (BMI) DOCUMENTED: ICD-10-PCS | Mod: CPTII,S$GLB,, | Performed by: STUDENT IN AN ORGANIZED HEALTH CARE EDUCATION/TRAINING PROGRAM

## 2020-12-23 PROCEDURE — 1126F AMNT PAIN NOTED NONE PRSNT: CPT | Mod: S$GLB,,, | Performed by: STUDENT IN AN ORGANIZED HEALTH CARE EDUCATION/TRAINING PROGRAM

## 2020-12-23 PROCEDURE — 3008F BODY MASS INDEX DOCD: CPT | Mod: CPTII,S$GLB,, | Performed by: STUDENT IN AN ORGANIZED HEALTH CARE EDUCATION/TRAINING PROGRAM

## 2020-12-23 PROCEDURE — 1126F PR PAIN SEVERITY QUANTIFIED, NO PAIN PRESENT: ICD-10-PCS | Mod: S$GLB,,, | Performed by: STUDENT IN AN ORGANIZED HEALTH CARE EDUCATION/TRAINING PROGRAM

## 2020-12-23 PROCEDURE — 3080F DIAST BP >= 90 MM HG: CPT | Mod: CPTII,S$GLB,, | Performed by: STUDENT IN AN ORGANIZED HEALTH CARE EDUCATION/TRAINING PROGRAM

## 2020-12-23 PROCEDURE — 3077F PR MOST RECENT SYSTOLIC BLOOD PRESSURE >= 140 MM HG: ICD-10-PCS | Mod: CPTII,S$GLB,, | Performed by: STUDENT IN AN ORGANIZED HEALTH CARE EDUCATION/TRAINING PROGRAM

## 2020-12-23 PROCEDURE — 3077F SYST BP >= 140 MM HG: CPT | Mod: CPTII,S$GLB,, | Performed by: STUDENT IN AN ORGANIZED HEALTH CARE EDUCATION/TRAINING PROGRAM

## 2020-12-23 PROCEDURE — 99214 PR OFFICE/OUTPT VISIT, EST, LEVL IV, 30-39 MIN: ICD-10-PCS | Mod: 25,S$GLB,, | Performed by: STUDENT IN AN ORGANIZED HEALTH CARE EDUCATION/TRAINING PROGRAM

## 2020-12-23 PROCEDURE — 93923 UPR/LXTR ART STDY 3+ LVLS: CPT | Mod: CCAT

## 2020-12-23 NOTE — PROGRESS NOTES
Patient to wound care for a T-COM of right foot, per Dr. Velasquez and Dr. Calderon.     Good lead placement. Patient tolerated well.     Results given to Dr. Nicole, Dr. Velasquez, and Dr. Calderon. Also available in the media.       Room Air------O2 Challenge    Chest Wall   Right dorsal foot   Right medial foot     Good response , normal exam.

## 2020-12-23 NOTE — PROCEDURES
Wound Debridement    Date/Time: 12/23/2020 3:15 PM  Performed by: Jackie Calderon DPM  Authorized by: Jackie Calderon DPM     Consent Done?:  Yes (Verbal)  Local anesthesia used?: No      Wound Details:    Location:  Right foot    Location:  Right 1st Toe    Type of Debridement:  Excisional       Length (cm):  0.4       Area (sq cm):  0.16       Width (cm):  0.4       Percent Debrided (%):  100       Depth (cm):  0.3       Total Area Debrided (sq cm):  0.16    Depth of debridement:  Subcutaneous tissue    Tissue debrided:  Subcutaneous and Other    Devitalized tissue debrided:  Biofilm, Callus and Fibrin    Instruments:  Blade and Curette    Bleeding:  Minimal  Patient tolerance:  Patient tolerated the procedure well with no immediate complications

## 2020-12-23 NOTE — PROGRESS NOTES
Subjective:      Patient ID: Santiago Lucia is a 60 y.o. male.    Chief Complaint: Foot Ulcer    Santiago is a 60 y.o. male who presents to the clinic for evaluation and treatment of high risk feet. Santiago has a past medical history of Avascular necrosis of bone of hip, left (10/30/2018), Coronary artery disease, DM w/o complication type II (8/9/2013), and NSTEMI (non-ST elevated myocardial infarction) (01/2013). The patient's chief complaint is here for a callus to his right great toe. Pt states he is not currently treated for diabetes, per chart review has a history of PAD. Pt relates that he has seen Dr. Nicole in the past for ingrown toenail, with wound to right great toe since 2017. Pt seen today, denies open wounds or signs of infection. This patient has documented high risk feet requiring routine maintenance secondary to diabetes mellitis and those secondary complications of diabetes, as mentioned..     12/9/20: Pt seen today for pre-ulcerative callus to right great toe. No new pedal complaints at this time.     12/16/20: Pt seen today for follow up for right great to ulcer, denies signs of infection, no other pedal complaints.     12/22/20: Pt seen today for follow up, s/p tcoms, no other pedal complaints at this time.     PCP: Yon Asif MD    Date Last Seen by PCP:     Current shoe gear:  Affected Foot: Casual shoes     Unaffected Foot: Casual shoes    Hemoglobin A1C   Date Value Ref Range Status   01/16/2013 7.1 (H) 4.0 - 6.2 % Final       Review of Systems   Constitution: Negative for chills, decreased appetite, diaphoresis and fever.   HENT: Negative for congestion and hearing loss.    Cardiovascular: Negative for chest pain, claudication, leg swelling and syncope.   Respiratory: Negative for cough and shortness of breath.    Skin: Positive for dry skin, nail changes, poor wound healing and suspicious lesions. Negative for color change, flushing, itching, rash and unusual hair distribution.    Musculoskeletal: Negative for back pain, joint pain, joint swelling and myalgias.   Gastrointestinal: Negative for nausea and vomiting.   Neurological: Positive for numbness. Negative for loss of balance, paresthesias and sensory change.   Psychiatric/Behavioral: Negative for altered mental status. The patient is not nervous/anxious.            Objective:      Physical Exam  Constitutional:       General: He is not in acute distress.     Appearance: He is well-developed. He is not diaphoretic.   Cardiovascular:      Comments: Skin temperature is within normal limits. Toes are cool to touch and feet are warm proximally. Hair growth is diminished. Skin is mildly atrophic and with mild hyperpigmentation. Mild edema noted, tony.   Musculoskeletal:         General: Deformity present. No tenderness.      Right foot: Decreased range of motion. Deformity present.      Left foot: Decreased range of motion. Deformity present.      Comments: Adequate joint range of motion without pain, limitation, nor crepitation to bilateral feet and ankle joints. Muscle strength is 5/5 in all groups bilaterally.       Feet:      Right foot:      Skin integrity: Dry skin present. No blister, erythema or warmth.      Left foot:      Skin integrity: Dry skin present. No blister, erythema or warmth.   Lymphadenopathy:      Comments: Negative lymphadenopathy bilateral popliteal fossa and tarsal tunnel.    Skin:     General: Skin is warm and dry.      Coloration: Skin is not pale.      Findings: No abrasion, bruising, burn, erythema, laceration, petechiae or rash.      Nails: There is no clubbing.        Comments: Skin is warm and dry, bilaterally, no acute SOI noted, bilaterally, appears stable.     Nails are well trimmed and are thickened by 2-4 mm's, dystrophic, and are darkened in coloration with subungual fungal debris.     Hyperkeratosis noted to distal, dorsal and plantar right hallux, upon debridement with wound measuring 0.4x0.4x0.4cm  with granular base and hyperkeratotic wound edges, no acute signs of infection, does not probe to bone, mild serosanguinous drainage, appears stable.     Skin is dry, bilateral foot           Neurological:      Mental Status: He is alert and oriented to person, place, and time.      Sensory: Sensory deficit present.      Motor: No abnormal muscle tone.      Comments: [place]-Kj 5.07 monofilamant testing is diminished. Sharp/dull sensation is diminished bilaterally. Light touch is diminished bilaterally. Vibratory sensation is diminished tony   Psychiatric:         Behavior: Behavior normal.         Thought Content: Thought content normal.         Judgment: Judgment normal.       Description per above          12/9/20:              Assessment:       Encounter Diagnoses   Name Primary?    Ulcer of toe, unspecified laterality, unspecified ulcer stage Yes    Type 2 diabetes mellitus with diabetic peripheral angiopathy with gangrene, unspecified whether long term insulin use          Plan:       Santiago was seen today for foot ulcer.    Diagnoses and all orders for this visit:    Ulcer of toe, unspecified laterality, unspecified ulcer stage  -     Wound Debridement    Type 2 diabetes mellitus with diabetic peripheral angiopathy with gangrene, unspecified whether long term insulin use  -     Wound Debridement      I counseled the patient on his conditions, their implications and medical management.    Xray reviewed     TCOMs reviewed, discussed with interventional cardiology, within normal limits for healing.     Wound debridement performed, see procedure note. Wound dressed with dharmesh, matthew foam, cast padding and secured in flexinet with DAR[employer] shoe. Pt to keep dressings clean, dry, and intact until follow up in 1 week.    Shoe inspection. Diabetic Foot Education. Patient reminded of the importance of good nutrition and blood sugar control to help prevent podiatric complications of diabetes. Patient instructed  on proper foot hygeine. We discussed wearing proper shoe gear, daily foot inspections, never walking without protective shoe gear, never putting sharp instruments to feet, routine podiatric nail visits      Discussed general foot care:  Wear comfortable, proper fitting shoes. Wash feet daily. Dry well. After drying, apply moisturizer to feet (no lotion to webspaces). Inspect feet daily for skin breaks, blisters, swelling, or redness. Wear cotton socks (preferably white)  Change socks every day. Do NOT walk barefoot. Do NOT use heating pads or warm/hot water soaks. I discussed with the  patient  signs and symptoms of infection including redness, drainage, purulence, odor, pain, elevated BS, streaking, fever, chills, etc . Patient is to seek medical attention (ER or urgent care) if these symptoms occur    F/u in 1-2 weeks, sooner PRN

## 2021-01-06 ENCOUNTER — OFFICE VISIT (OUTPATIENT)
Dept: CARDIOLOGY | Facility: CLINIC | Age: 61
End: 2021-01-06
Payer: COMMERCIAL

## 2021-01-06 VITALS
OXYGEN SATURATION: 96 % | SYSTOLIC BLOOD PRESSURE: 133 MMHG | WEIGHT: 216.06 LBS | HEIGHT: 77 IN | BODY MASS INDEX: 25.51 KG/M2 | HEART RATE: 72 BPM | DIASTOLIC BLOOD PRESSURE: 82 MMHG

## 2021-01-06 DIAGNOSIS — I25.10 CORONARY ARTERY DISEASE INVOLVING NATIVE CORONARY ARTERY OF NATIVE HEART WITHOUT ANGINA PECTORIS: ICD-10-CM

## 2021-01-06 DIAGNOSIS — I10 ESSENTIAL HYPERTENSION: ICD-10-CM

## 2021-01-06 DIAGNOSIS — E11.42 POLYNEUROPATHY DUE TO TYPE 2 DIABETES MELLITUS: ICD-10-CM

## 2021-01-06 DIAGNOSIS — Z96.641 STATUS POST RIGHT HIP REPLACEMENT: ICD-10-CM

## 2021-01-06 DIAGNOSIS — Z95.820 S/P ANGIOPLASTY WITH STENT: Primary | Chronic | ICD-10-CM

## 2021-01-06 DIAGNOSIS — E78.5 DYSLIPIDEMIA: ICD-10-CM

## 2021-01-06 DIAGNOSIS — I73.9 PVD (PERIPHERAL VASCULAR DISEASE): ICD-10-CM

## 2021-01-06 PROCEDURE — 3075F SYST BP GE 130 - 139MM HG: CPT | Mod: CPTII,S$GLB,, | Performed by: INTERNAL MEDICINE

## 2021-01-06 PROCEDURE — 99999 PR PBB SHADOW E&M-EST. PATIENT-LVL III: ICD-10-PCS | Mod: PBBFAC,,, | Performed by: INTERNAL MEDICINE

## 2021-01-06 PROCEDURE — 3079F PR MOST RECENT DIASTOLIC BLOOD PRESSURE 80-89 MM HG: ICD-10-PCS | Mod: CPTII,S$GLB,, | Performed by: INTERNAL MEDICINE

## 2021-01-06 PROCEDURE — 1126F PR PAIN SEVERITY QUANTIFIED, NO PAIN PRESENT: ICD-10-PCS | Mod: S$GLB,,, | Performed by: INTERNAL MEDICINE

## 2021-01-06 PROCEDURE — 99999 PR PBB SHADOW E&M-EST. PATIENT-LVL III: CPT | Mod: PBBFAC,,, | Performed by: INTERNAL MEDICINE

## 2021-01-06 PROCEDURE — 3008F BODY MASS INDEX DOCD: CPT | Mod: CPTII,S$GLB,, | Performed by: INTERNAL MEDICINE

## 2021-01-06 PROCEDURE — 1126F AMNT PAIN NOTED NONE PRSNT: CPT | Mod: S$GLB,,, | Performed by: INTERNAL MEDICINE

## 2021-01-06 PROCEDURE — 99214 OFFICE O/P EST MOD 30 MIN: CPT | Mod: S$GLB,,, | Performed by: INTERNAL MEDICINE

## 2021-01-06 PROCEDURE — 99214 PR OFFICE/OUTPT VISIT, EST, LEVL IV, 30-39 MIN: ICD-10-PCS | Mod: S$GLB,,, | Performed by: INTERNAL MEDICINE

## 2021-01-06 PROCEDURE — 3079F DIAST BP 80-89 MM HG: CPT | Mod: CPTII,S$GLB,, | Performed by: INTERNAL MEDICINE

## 2021-01-06 PROCEDURE — 3075F PR MOST RECENT SYSTOLIC BLOOD PRESS GE 130-139MM HG: ICD-10-PCS | Mod: CPTII,S$GLB,, | Performed by: INTERNAL MEDICINE

## 2021-01-06 PROCEDURE — 3008F PR BODY MASS INDEX (BMI) DOCUMENTED: ICD-10-PCS | Mod: CPTII,S$GLB,, | Performed by: INTERNAL MEDICINE

## 2021-01-20 ENCOUNTER — OFFICE VISIT (OUTPATIENT)
Dept: PODIATRY | Facility: CLINIC | Age: 61
End: 2021-01-20
Payer: COMMERCIAL

## 2021-01-20 VITALS
SYSTOLIC BLOOD PRESSURE: 145 MMHG | HEIGHT: 77 IN | DIASTOLIC BLOOD PRESSURE: 76 MMHG | BODY MASS INDEX: 25.51 KG/M2 | WEIGHT: 216.06 LBS | HEART RATE: 71 BPM

## 2021-01-20 DIAGNOSIS — E11.52 TYPE 2 DIABETES MELLITUS WITH DIABETIC PERIPHERAL ANGIOPATHY WITH GANGRENE, UNSPECIFIED WHETHER LONG TERM INSULIN USE: ICD-10-CM

## 2021-01-20 DIAGNOSIS — L84 PRE-ULCERATIVE CALLUSES: ICD-10-CM

## 2021-01-20 DIAGNOSIS — L97.509 ULCER OF TOE, UNSPECIFIED LATERALITY, UNSPECIFIED ULCER STAGE: Primary | ICD-10-CM

## 2021-01-20 PROCEDURE — 3078F DIAST BP <80 MM HG: CPT | Mod: CPTII,S$GLB,, | Performed by: STUDENT IN AN ORGANIZED HEALTH CARE EDUCATION/TRAINING PROGRAM

## 2021-01-20 PROCEDURE — 11042 DBRDMT SUBQ TIS 1ST 20SQCM/<: CPT | Mod: S$GLB,,, | Performed by: STUDENT IN AN ORGANIZED HEALTH CARE EDUCATION/TRAINING PROGRAM

## 2021-01-20 PROCEDURE — 87077 CULTURE AEROBIC IDENTIFY: CPT

## 2021-01-20 PROCEDURE — 3077F PR MOST RECENT SYSTOLIC BLOOD PRESSURE >= 140 MM HG: ICD-10-PCS | Mod: CPTII,S$GLB,, | Performed by: STUDENT IN AN ORGANIZED HEALTH CARE EDUCATION/TRAINING PROGRAM

## 2021-01-20 PROCEDURE — 87186 SC STD MICRODIL/AGAR DIL: CPT

## 2021-01-20 PROCEDURE — 3078F PR MOST RECENT DIASTOLIC BLOOD PRESSURE < 80 MM HG: ICD-10-PCS | Mod: CPTII,S$GLB,, | Performed by: STUDENT IN AN ORGANIZED HEALTH CARE EDUCATION/TRAINING PROGRAM

## 2021-01-20 PROCEDURE — 3008F PR BODY MASS INDEX (BMI) DOCUMENTED: ICD-10-PCS | Mod: CPTII,S$GLB,, | Performed by: STUDENT IN AN ORGANIZED HEALTH CARE EDUCATION/TRAINING PROGRAM

## 2021-01-20 PROCEDURE — 1125F AMNT PAIN NOTED PAIN PRSNT: CPT | Mod: S$GLB,,, | Performed by: STUDENT IN AN ORGANIZED HEALTH CARE EDUCATION/TRAINING PROGRAM

## 2021-01-20 PROCEDURE — 87147 CULTURE TYPE IMMUNOLOGIC: CPT

## 2021-01-20 PROCEDURE — 99999 PR PBB SHADOW E&M-EST. PATIENT-LVL III: CPT | Mod: PBBFAC,,, | Performed by: STUDENT IN AN ORGANIZED HEALTH CARE EDUCATION/TRAINING PROGRAM

## 2021-01-20 PROCEDURE — 3008F BODY MASS INDEX DOCD: CPT | Mod: CPTII,S$GLB,, | Performed by: STUDENT IN AN ORGANIZED HEALTH CARE EDUCATION/TRAINING PROGRAM

## 2021-01-20 PROCEDURE — 11042 WOUND DEBRIDEMENT: ICD-10-PCS | Mod: S$GLB,,, | Performed by: STUDENT IN AN ORGANIZED HEALTH CARE EDUCATION/TRAINING PROGRAM

## 2021-01-20 PROCEDURE — 3077F SYST BP >= 140 MM HG: CPT | Mod: CPTII,S$GLB,, | Performed by: STUDENT IN AN ORGANIZED HEALTH CARE EDUCATION/TRAINING PROGRAM

## 2021-01-20 PROCEDURE — 87070 CULTURE OTHR SPECIMN AEROBIC: CPT

## 2021-01-20 PROCEDURE — 1125F PR PAIN SEVERITY QUANTIFIED, PAIN PRESENT: ICD-10-PCS | Mod: S$GLB,,, | Performed by: STUDENT IN AN ORGANIZED HEALTH CARE EDUCATION/TRAINING PROGRAM

## 2021-01-20 PROCEDURE — 87075 CULTR BACTERIA EXCEPT BLOOD: CPT

## 2021-01-20 PROCEDURE — 99214 OFFICE O/P EST MOD 30 MIN: CPT | Mod: 25,S$GLB,, | Performed by: STUDENT IN AN ORGANIZED HEALTH CARE EDUCATION/TRAINING PROGRAM

## 2021-01-20 PROCEDURE — 99999 PR PBB SHADOW E&M-EST. PATIENT-LVL III: ICD-10-PCS | Mod: PBBFAC,,, | Performed by: STUDENT IN AN ORGANIZED HEALTH CARE EDUCATION/TRAINING PROGRAM

## 2021-01-20 PROCEDURE — 99214 PR OFFICE/OUTPT VISIT, EST, LEVL IV, 30-39 MIN: ICD-10-PCS | Mod: 25,S$GLB,, | Performed by: STUDENT IN AN ORGANIZED HEALTH CARE EDUCATION/TRAINING PROGRAM

## 2021-01-20 RX ORDER — TRAMADOL HYDROCHLORIDE 50 MG/1
50 TABLET ORAL EVERY 8 HOURS PRN
Qty: 21 TABLET | Refills: 0 | Status: SHIPPED | OUTPATIENT
Start: 2021-01-20 | End: 2021-02-04

## 2021-01-20 RX ORDER — DOXYCYCLINE 100 MG/1
100 CAPSULE ORAL 2 TIMES DAILY
Qty: 24 CAPSULE | Refills: 0 | Status: SHIPPED | OUTPATIENT
Start: 2021-01-20 | End: 2021-02-04

## 2021-01-25 LAB
BACTERIA SPEC AEROBE CULT: ABNORMAL
BACTERIA SPEC AEROBE CULT: ABNORMAL

## 2021-01-27 ENCOUNTER — OFFICE VISIT (OUTPATIENT)
Dept: PODIATRY | Facility: CLINIC | Age: 61
End: 2021-01-27
Payer: COMMERCIAL

## 2021-01-27 VITALS
HEART RATE: 98 BPM | SYSTOLIC BLOOD PRESSURE: 160 MMHG | DIASTOLIC BLOOD PRESSURE: 78 MMHG | BODY MASS INDEX: 25.51 KG/M2 | WEIGHT: 216.06 LBS | HEIGHT: 77 IN

## 2021-01-27 DIAGNOSIS — M25.559 PAIN IN JOINT INVOLVING PELVIC REGION AND THIGH, UNSPECIFIED LATERALITY: ICD-10-CM

## 2021-01-27 DIAGNOSIS — L97.509 ULCER OF TOE, UNSPECIFIED LATERALITY, UNSPECIFIED ULCER STAGE: Primary | ICD-10-CM

## 2021-01-27 DIAGNOSIS — M87.052 AVASCULAR NECROSIS OF BONE OF HIP, LEFT: ICD-10-CM

## 2021-01-27 DIAGNOSIS — L84 PRE-ULCERATIVE CALLUSES: ICD-10-CM

## 2021-01-27 DIAGNOSIS — Z96.641 STATUS POST RIGHT HIP REPLACEMENT: ICD-10-CM

## 2021-01-27 LAB — BACTERIA SPEC ANAEROBE CULT: NORMAL

## 2021-01-27 PROCEDURE — 99214 OFFICE O/P EST MOD 30 MIN: CPT | Mod: 25,S$GLB,, | Performed by: STUDENT IN AN ORGANIZED HEALTH CARE EDUCATION/TRAINING PROGRAM

## 2021-01-27 PROCEDURE — 3077F PR MOST RECENT SYSTOLIC BLOOD PRESSURE >= 140 MM HG: ICD-10-PCS | Mod: CPTII,S$GLB,, | Performed by: STUDENT IN AN ORGANIZED HEALTH CARE EDUCATION/TRAINING PROGRAM

## 2021-01-27 PROCEDURE — 3008F BODY MASS INDEX DOCD: CPT | Mod: CPTII,S$GLB,, | Performed by: STUDENT IN AN ORGANIZED HEALTH CARE EDUCATION/TRAINING PROGRAM

## 2021-01-27 PROCEDURE — 99999 PR PBB SHADOW E&M-EST. PATIENT-LVL III: CPT | Mod: PBBFAC,,, | Performed by: STUDENT IN AN ORGANIZED HEALTH CARE EDUCATION/TRAINING PROGRAM

## 2021-01-27 PROCEDURE — 1125F AMNT PAIN NOTED PAIN PRSNT: CPT | Mod: S$GLB,,, | Performed by: STUDENT IN AN ORGANIZED HEALTH CARE EDUCATION/TRAINING PROGRAM

## 2021-01-27 PROCEDURE — 3077F SYST BP >= 140 MM HG: CPT | Mod: CPTII,S$GLB,, | Performed by: STUDENT IN AN ORGANIZED HEALTH CARE EDUCATION/TRAINING PROGRAM

## 2021-01-27 PROCEDURE — 3008F PR BODY MASS INDEX (BMI) DOCUMENTED: ICD-10-PCS | Mod: CPTII,S$GLB,, | Performed by: STUDENT IN AN ORGANIZED HEALTH CARE EDUCATION/TRAINING PROGRAM

## 2021-01-27 PROCEDURE — 11042 WOUND DEBRIDEMENT: ICD-10-PCS | Mod: S$GLB,,, | Performed by: STUDENT IN AN ORGANIZED HEALTH CARE EDUCATION/TRAINING PROGRAM

## 2021-01-27 PROCEDURE — 3078F DIAST BP <80 MM HG: CPT | Mod: CPTII,S$GLB,, | Performed by: STUDENT IN AN ORGANIZED HEALTH CARE EDUCATION/TRAINING PROGRAM

## 2021-01-27 PROCEDURE — 1125F PR PAIN SEVERITY QUANTIFIED, PAIN PRESENT: ICD-10-PCS | Mod: S$GLB,,, | Performed by: STUDENT IN AN ORGANIZED HEALTH CARE EDUCATION/TRAINING PROGRAM

## 2021-01-27 PROCEDURE — 3078F PR MOST RECENT DIASTOLIC BLOOD PRESSURE < 80 MM HG: ICD-10-PCS | Mod: CPTII,S$GLB,, | Performed by: STUDENT IN AN ORGANIZED HEALTH CARE EDUCATION/TRAINING PROGRAM

## 2021-01-27 PROCEDURE — 11042 DBRDMT SUBQ TIS 1ST 20SQCM/<: CPT | Mod: S$GLB,,, | Performed by: STUDENT IN AN ORGANIZED HEALTH CARE EDUCATION/TRAINING PROGRAM

## 2021-01-27 PROCEDURE — 99214 PR OFFICE/OUTPT VISIT, EST, LEVL IV, 30-39 MIN: ICD-10-PCS | Mod: 25,S$GLB,, | Performed by: STUDENT IN AN ORGANIZED HEALTH CARE EDUCATION/TRAINING PROGRAM

## 2021-01-27 PROCEDURE — 99999 PR PBB SHADOW E&M-EST. PATIENT-LVL III: ICD-10-PCS | Mod: PBBFAC,,, | Performed by: STUDENT IN AN ORGANIZED HEALTH CARE EDUCATION/TRAINING PROGRAM

## 2021-02-03 ENCOUNTER — OFFICE VISIT (OUTPATIENT)
Dept: PODIATRY | Facility: CLINIC | Age: 61
End: 2021-02-03
Payer: COMMERCIAL

## 2021-02-03 VITALS — BODY MASS INDEX: 25.51 KG/M2 | HEIGHT: 77 IN | WEIGHT: 216.06 LBS

## 2021-02-03 DIAGNOSIS — L84 PRE-ULCERATIVE CALLUSES: ICD-10-CM

## 2021-02-03 DIAGNOSIS — L97.509 ULCER OF TOE, UNSPECIFIED LATERALITY, UNSPECIFIED ULCER STAGE: Primary | ICD-10-CM

## 2021-02-03 PROCEDURE — 1125F AMNT PAIN NOTED PAIN PRSNT: CPT | Mod: S$GLB,,, | Performed by: STUDENT IN AN ORGANIZED HEALTH CARE EDUCATION/TRAINING PROGRAM

## 2021-02-03 PROCEDURE — 99214 PR OFFICE/OUTPT VISIT, EST, LEVL IV, 30-39 MIN: ICD-10-PCS | Mod: 25,S$GLB,, | Performed by: STUDENT IN AN ORGANIZED HEALTH CARE EDUCATION/TRAINING PROGRAM

## 2021-02-03 PROCEDURE — 1125F PR PAIN SEVERITY QUANTIFIED, PAIN PRESENT: ICD-10-PCS | Mod: S$GLB,,, | Performed by: STUDENT IN AN ORGANIZED HEALTH CARE EDUCATION/TRAINING PROGRAM

## 2021-02-03 PROCEDURE — 3008F BODY MASS INDEX DOCD: CPT | Mod: CPTII,S$GLB,, | Performed by: STUDENT IN AN ORGANIZED HEALTH CARE EDUCATION/TRAINING PROGRAM

## 2021-02-03 PROCEDURE — 99214 OFFICE O/P EST MOD 30 MIN: CPT | Mod: 25,S$GLB,, | Performed by: STUDENT IN AN ORGANIZED HEALTH CARE EDUCATION/TRAINING PROGRAM

## 2021-02-03 PROCEDURE — 3008F PR BODY MASS INDEX (BMI) DOCUMENTED: ICD-10-PCS | Mod: CPTII,S$GLB,, | Performed by: STUDENT IN AN ORGANIZED HEALTH CARE EDUCATION/TRAINING PROGRAM

## 2021-02-03 PROCEDURE — 99999 PR PBB SHADOW E&M-EST. PATIENT-LVL III: CPT | Mod: PBBFAC,,, | Performed by: STUDENT IN AN ORGANIZED HEALTH CARE EDUCATION/TRAINING PROGRAM

## 2021-02-03 PROCEDURE — 99999 PR PBB SHADOW E&M-EST. PATIENT-LVL III: ICD-10-PCS | Mod: PBBFAC,,, | Performed by: STUDENT IN AN ORGANIZED HEALTH CARE EDUCATION/TRAINING PROGRAM

## 2021-03-01 ENCOUNTER — CLINICAL SUPPORT (OUTPATIENT)
Dept: REHABILITATION | Facility: HOSPITAL | Age: 61
End: 2021-03-01
Attending: STUDENT IN AN ORGANIZED HEALTH CARE EDUCATION/TRAINING PROGRAM
Payer: COMMERCIAL

## 2021-03-01 DIAGNOSIS — L84 PRE-ULCERATIVE CALLUSES: ICD-10-CM

## 2021-03-01 DIAGNOSIS — Z96.641 STATUS POST RIGHT HIP REPLACEMENT: ICD-10-CM

## 2021-03-01 DIAGNOSIS — M25.559 PAIN IN JOINT INVOLVING PELVIC REGION AND THIGH, UNSPECIFIED LATERALITY: ICD-10-CM

## 2021-03-01 PROCEDURE — 97162 PT EVAL MOD COMPLEX 30 MIN: CPT | Mod: PO

## 2021-03-01 PROCEDURE — 97110 THERAPEUTIC EXERCISES: CPT | Mod: PO

## 2021-03-03 ENCOUNTER — IMMUNIZATION (OUTPATIENT)
Dept: PRIMARY CARE CLINIC | Facility: CLINIC | Age: 61
End: 2021-03-03
Payer: COMMERCIAL

## 2021-03-03 DIAGNOSIS — Z23 NEED FOR VACCINATION: Primary | ICD-10-CM

## 2021-03-03 PROCEDURE — 91300 COVID-19, MRNA, LNP-S, PF, 30 MCG/0.3 ML DOSE VACCINE: CPT | Mod: PBBFAC | Performed by: EMERGENCY MEDICINE

## 2021-03-16 ENCOUNTER — OFFICE VISIT (OUTPATIENT)
Dept: PODIATRY | Facility: CLINIC | Age: 61
End: 2021-03-16
Payer: COMMERCIAL

## 2021-03-16 VITALS — HEIGHT: 75 IN | WEIGHT: 224 LBS | BODY MASS INDEX: 27.85 KG/M2

## 2021-03-16 DIAGNOSIS — Z96.641 STATUS POST RIGHT HIP REPLACEMENT: ICD-10-CM

## 2021-03-16 DIAGNOSIS — M87.052 AVASCULAR NECROSIS OF BONE OF HIP, LEFT: ICD-10-CM

## 2021-03-16 DIAGNOSIS — M25.559 PAIN IN JOINT INVOLVING PELVIC REGION AND THIGH, UNSPECIFIED LATERALITY: ICD-10-CM

## 2021-03-16 DIAGNOSIS — E11.52 TYPE 2 DIABETES MELLITUS WITH DIABETIC PERIPHERAL ANGIOPATHY WITH GANGRENE, UNSPECIFIED WHETHER LONG TERM INSULIN USE: ICD-10-CM

## 2021-03-16 DIAGNOSIS — L84 PRE-ULCERATIVE CALLUSES: Primary | ICD-10-CM

## 2021-03-16 PROCEDURE — 1126F PR PAIN SEVERITY QUANTIFIED, NO PAIN PRESENT: ICD-10-PCS | Mod: S$GLB,,, | Performed by: STUDENT IN AN ORGANIZED HEALTH CARE EDUCATION/TRAINING PROGRAM

## 2021-03-16 PROCEDURE — 3008F BODY MASS INDEX DOCD: CPT | Mod: CPTII,S$GLB,, | Performed by: STUDENT IN AN ORGANIZED HEALTH CARE EDUCATION/TRAINING PROGRAM

## 2021-03-16 PROCEDURE — 99214 OFFICE O/P EST MOD 30 MIN: CPT | Mod: 25,S$GLB,, | Performed by: STUDENT IN AN ORGANIZED HEALTH CARE EDUCATION/TRAINING PROGRAM

## 2021-03-16 PROCEDURE — 1126F AMNT PAIN NOTED NONE PRSNT: CPT | Mod: S$GLB,,, | Performed by: STUDENT IN AN ORGANIZED HEALTH CARE EDUCATION/TRAINING PROGRAM

## 2021-03-16 PROCEDURE — 3008F PR BODY MASS INDEX (BMI) DOCUMENTED: ICD-10-PCS | Mod: CPTII,S$GLB,, | Performed by: STUDENT IN AN ORGANIZED HEALTH CARE EDUCATION/TRAINING PROGRAM

## 2021-03-16 PROCEDURE — 99214 PR OFFICE/OUTPT VISIT, EST, LEVL IV, 30-39 MIN: ICD-10-PCS | Mod: 25,S$GLB,, | Performed by: STUDENT IN AN ORGANIZED HEALTH CARE EDUCATION/TRAINING PROGRAM

## 2021-03-16 PROCEDURE — 99999 PR PBB SHADOW E&M-EST. PATIENT-LVL III: CPT | Mod: PBBFAC,,, | Performed by: STUDENT IN AN ORGANIZED HEALTH CARE EDUCATION/TRAINING PROGRAM

## 2021-03-16 PROCEDURE — 99999 PR PBB SHADOW E&M-EST. PATIENT-LVL III: ICD-10-PCS | Mod: PBBFAC,,, | Performed by: STUDENT IN AN ORGANIZED HEALTH CARE EDUCATION/TRAINING PROGRAM

## 2021-03-16 RX ORDER — TRAMADOL HYDROCHLORIDE 50 MG/1
50 TABLET ORAL EVERY 6 HOURS PRN
Qty: 28 TABLET | Refills: 0 | Status: SHIPPED | OUTPATIENT
Start: 2021-03-16 | End: 2021-10-05

## 2021-03-18 ENCOUNTER — CLINICAL SUPPORT (OUTPATIENT)
Dept: REHABILITATION | Facility: HOSPITAL | Age: 61
End: 2021-03-18
Attending: STUDENT IN AN ORGANIZED HEALTH CARE EDUCATION/TRAINING PROGRAM
Payer: COMMERCIAL

## 2021-03-18 PROCEDURE — 97140 MANUAL THERAPY 1/> REGIONS: CPT | Mod: PO

## 2021-03-18 PROCEDURE — 97110 THERAPEUTIC EXERCISES: CPT | Mod: PO

## 2021-03-24 ENCOUNTER — IMMUNIZATION (OUTPATIENT)
Dept: PRIMARY CARE CLINIC | Facility: CLINIC | Age: 61
End: 2021-03-24
Payer: COMMERCIAL

## 2021-03-24 DIAGNOSIS — Z23 NEED FOR VACCINATION: Primary | ICD-10-CM

## 2021-03-24 PROCEDURE — 91300 COVID-19, MRNA, LNP-S, PF, 30 MCG/0.3 ML DOSE VACCINE: CPT | Mod: PBBFAC | Performed by: EMERGENCY MEDICINE

## 2021-03-24 PROCEDURE — 0002A COVID-19, MRNA, LNP-S, PF, 30 MCG/0.3 ML DOSE VACCINE: CPT | Mod: PBBFAC | Performed by: EMERGENCY MEDICINE

## 2021-04-06 ENCOUNTER — CLINICAL SUPPORT (OUTPATIENT)
Dept: REHABILITATION | Facility: HOSPITAL | Age: 61
End: 2021-04-06
Attending: STUDENT IN AN ORGANIZED HEALTH CARE EDUCATION/TRAINING PROGRAM
Payer: COMMERCIAL

## 2021-04-06 DIAGNOSIS — R53.1 DECREASED STRENGTH: ICD-10-CM

## 2021-04-06 DIAGNOSIS — M25.652 DECREASED RANGE OF MOTION OF BOTH HIPS: ICD-10-CM

## 2021-04-06 DIAGNOSIS — Z74.09 DECREASED FUNCTIONAL MOBILITY AND ENDURANCE: ICD-10-CM

## 2021-04-06 DIAGNOSIS — M25.651 DECREASED RANGE OF MOTION OF BOTH HIPS: ICD-10-CM

## 2021-04-06 PROBLEM — M25.659 DECREASED RANGE OF MOTION OF HIP: Status: ACTIVE | Noted: 2021-04-06

## 2021-04-06 PROCEDURE — 97110 THERAPEUTIC EXERCISES: CPT | Mod: PO

## 2021-04-06 PROCEDURE — 97140 MANUAL THERAPY 1/> REGIONS: CPT | Mod: PO

## 2021-04-12 ENCOUNTER — CLINICAL SUPPORT (OUTPATIENT)
Dept: REHABILITATION | Facility: HOSPITAL | Age: 61
End: 2021-04-12
Attending: STUDENT IN AN ORGANIZED HEALTH CARE EDUCATION/TRAINING PROGRAM
Payer: COMMERCIAL

## 2021-04-12 DIAGNOSIS — M25.652 DECREASED RANGE OF MOTION OF BOTH HIPS: ICD-10-CM

## 2021-04-12 DIAGNOSIS — R53.1 DECREASED STRENGTH: ICD-10-CM

## 2021-04-12 DIAGNOSIS — M25.651 DECREASED RANGE OF MOTION OF BOTH HIPS: ICD-10-CM

## 2021-04-12 DIAGNOSIS — Z74.09 DECREASED FUNCTIONAL MOBILITY AND ENDURANCE: ICD-10-CM

## 2021-04-12 PROCEDURE — 97750 PHYSICAL PERFORMANCE TEST: CPT | Mod: PO

## 2021-04-12 PROCEDURE — 97140 MANUAL THERAPY 1/> REGIONS: CPT | Mod: PO

## 2021-04-13 ENCOUNTER — OFFICE VISIT (OUTPATIENT)
Dept: PODIATRY | Facility: CLINIC | Age: 61
End: 2021-04-13
Payer: COMMERCIAL

## 2021-04-13 VITALS
BODY MASS INDEX: 27.85 KG/M2 | SYSTOLIC BLOOD PRESSURE: 125 MMHG | DIASTOLIC BLOOD PRESSURE: 73 MMHG | WEIGHT: 224 LBS | HEIGHT: 75 IN | HEART RATE: 79 BPM

## 2021-04-13 DIAGNOSIS — T14.8XXA BLISTER: ICD-10-CM

## 2021-04-13 DIAGNOSIS — L97.509 ULCER OF FOOT, UNSPECIFIED LATERALITY, UNSPECIFIED ULCER STAGE: ICD-10-CM

## 2021-04-13 DIAGNOSIS — L84 PRE-ULCERATIVE CALLUSES: Primary | ICD-10-CM

## 2021-04-13 PROCEDURE — 3008F PR BODY MASS INDEX (BMI) DOCUMENTED: ICD-10-PCS | Mod: CPTII,S$GLB,, | Performed by: STUDENT IN AN ORGANIZED HEALTH CARE EDUCATION/TRAINING PROGRAM

## 2021-04-13 PROCEDURE — 10140 I&D HMTMA SEROMA/FLUID COLLJ: CPT | Mod: S$GLB,,, | Performed by: STUDENT IN AN ORGANIZED HEALTH CARE EDUCATION/TRAINING PROGRAM

## 2021-04-13 PROCEDURE — 11042 DBRDMT SUBQ TIS 1ST 20SQCM/<: CPT | Mod: 51,S$GLB,, | Performed by: STUDENT IN AN ORGANIZED HEALTH CARE EDUCATION/TRAINING PROGRAM

## 2021-04-13 PROCEDURE — 99999 PR PBB SHADOW E&M-EST. PATIENT-LVL III: CPT | Mod: PBBFAC,,, | Performed by: STUDENT IN AN ORGANIZED HEALTH CARE EDUCATION/TRAINING PROGRAM

## 2021-04-13 PROCEDURE — 1125F PR PAIN SEVERITY QUANTIFIED, PAIN PRESENT: ICD-10-PCS | Mod: S$GLB,,, | Performed by: STUDENT IN AN ORGANIZED HEALTH CARE EDUCATION/TRAINING PROGRAM

## 2021-04-13 PROCEDURE — 99214 OFFICE O/P EST MOD 30 MIN: CPT | Mod: 25,S$GLB,, | Performed by: STUDENT IN AN ORGANIZED HEALTH CARE EDUCATION/TRAINING PROGRAM

## 2021-04-13 PROCEDURE — 11042 WOUND DEBRIDEMENT: ICD-10-PCS | Mod: 51,S$GLB,, | Performed by: STUDENT IN AN ORGANIZED HEALTH CARE EDUCATION/TRAINING PROGRAM

## 2021-04-13 PROCEDURE — 3008F BODY MASS INDEX DOCD: CPT | Mod: CPTII,S$GLB,, | Performed by: STUDENT IN AN ORGANIZED HEALTH CARE EDUCATION/TRAINING PROGRAM

## 2021-04-13 PROCEDURE — 1125F AMNT PAIN NOTED PAIN PRSNT: CPT | Mod: S$GLB,,, | Performed by: STUDENT IN AN ORGANIZED HEALTH CARE EDUCATION/TRAINING PROGRAM

## 2021-04-13 PROCEDURE — 99214 PR OFFICE/OUTPT VISIT, EST, LEVL IV, 30-39 MIN: ICD-10-PCS | Mod: 25,S$GLB,, | Performed by: STUDENT IN AN ORGANIZED HEALTH CARE EDUCATION/TRAINING PROGRAM

## 2021-04-13 PROCEDURE — 99999 PR PBB SHADOW E&M-EST. PATIENT-LVL III: ICD-10-PCS | Mod: PBBFAC,,, | Performed by: STUDENT IN AN ORGANIZED HEALTH CARE EDUCATION/TRAINING PROGRAM

## 2021-04-13 PROCEDURE — 10140 PR DRAINAGE OF HEMATOMA/FLUID: ICD-10-PCS | Mod: S$GLB,,, | Performed by: STUDENT IN AN ORGANIZED HEALTH CARE EDUCATION/TRAINING PROGRAM

## 2021-04-21 ENCOUNTER — CLINICAL SUPPORT (OUTPATIENT)
Dept: REHABILITATION | Facility: HOSPITAL | Age: 61
End: 2021-04-21
Attending: STUDENT IN AN ORGANIZED HEALTH CARE EDUCATION/TRAINING PROGRAM
Payer: COMMERCIAL

## 2021-04-21 DIAGNOSIS — Z74.09 DECREASED FUNCTIONAL MOBILITY AND ENDURANCE: ICD-10-CM

## 2021-04-21 DIAGNOSIS — M25.651 DECREASED RANGE OF MOTION OF BOTH HIPS: ICD-10-CM

## 2021-04-21 DIAGNOSIS — R53.1 DECREASED STRENGTH: ICD-10-CM

## 2021-04-21 DIAGNOSIS — M25.652 DECREASED RANGE OF MOTION OF BOTH HIPS: ICD-10-CM

## 2021-04-21 PROCEDURE — 97110 THERAPEUTIC EXERCISES: CPT | Mod: PO

## 2021-04-26 ENCOUNTER — CLINICAL SUPPORT (OUTPATIENT)
Dept: REHABILITATION | Facility: HOSPITAL | Age: 61
End: 2021-04-26
Attending: STUDENT IN AN ORGANIZED HEALTH CARE EDUCATION/TRAINING PROGRAM
Payer: COMMERCIAL

## 2021-04-26 DIAGNOSIS — R53.1 DECREASED STRENGTH: ICD-10-CM

## 2021-04-26 DIAGNOSIS — M25.652 DECREASED RANGE OF MOTION OF BOTH HIPS: ICD-10-CM

## 2021-04-26 DIAGNOSIS — M25.651 DECREASED RANGE OF MOTION OF BOTH HIPS: ICD-10-CM

## 2021-04-26 DIAGNOSIS — Z74.09 DECREASED FUNCTIONAL MOBILITY AND ENDURANCE: ICD-10-CM

## 2021-04-26 PROCEDURE — 97110 THERAPEUTIC EXERCISES: CPT | Mod: PO

## 2021-05-04 ENCOUNTER — OFFICE VISIT (OUTPATIENT)
Dept: PODIATRY | Facility: CLINIC | Age: 61
End: 2021-05-04
Payer: COMMERCIAL

## 2021-05-04 VITALS — WEIGHT: 224 LBS | BODY MASS INDEX: 27.85 KG/M2 | HEIGHT: 75 IN

## 2021-05-04 DIAGNOSIS — L97.509 ULCER OF TOE, UNSPECIFIED LATERALITY, UNSPECIFIED ULCER STAGE: ICD-10-CM

## 2021-05-04 DIAGNOSIS — I73.9 PVD (PERIPHERAL VASCULAR DISEASE): ICD-10-CM

## 2021-05-04 DIAGNOSIS — M86.60 OTHER CHRONIC OSTEOMYELITIS, UNSPECIFIED SITE: ICD-10-CM

## 2021-05-04 DIAGNOSIS — M87.052 AVASCULAR NECROSIS OF BONE OF HIP, LEFT: ICD-10-CM

## 2021-05-04 DIAGNOSIS — L97.509 ULCER OF FOOT, UNSPECIFIED LATERALITY, UNSPECIFIED ULCER STAGE: ICD-10-CM

## 2021-05-04 DIAGNOSIS — L84 PRE-ULCERATIVE CALLUSES: Primary | ICD-10-CM

## 2021-05-04 PROCEDURE — 11042 WOUND DEBRIDEMENT: ICD-10-PCS | Mod: S$GLB,,, | Performed by: STUDENT IN AN ORGANIZED HEALTH CARE EDUCATION/TRAINING PROGRAM

## 2021-05-04 PROCEDURE — 1125F PR PAIN SEVERITY QUANTIFIED, PAIN PRESENT: ICD-10-PCS | Mod: S$GLB,,, | Performed by: STUDENT IN AN ORGANIZED HEALTH CARE EDUCATION/TRAINING PROGRAM

## 2021-05-04 PROCEDURE — 1125F AMNT PAIN NOTED PAIN PRSNT: CPT | Mod: S$GLB,,, | Performed by: STUDENT IN AN ORGANIZED HEALTH CARE EDUCATION/TRAINING PROGRAM

## 2021-05-04 PROCEDURE — 11042 DBRDMT SUBQ TIS 1ST 20SQCM/<: CPT | Mod: S$GLB,,, | Performed by: STUDENT IN AN ORGANIZED HEALTH CARE EDUCATION/TRAINING PROGRAM

## 2021-05-04 PROCEDURE — 3008F PR BODY MASS INDEX (BMI) DOCUMENTED: ICD-10-PCS | Mod: CPTII,S$GLB,, | Performed by: STUDENT IN AN ORGANIZED HEALTH CARE EDUCATION/TRAINING PROGRAM

## 2021-05-04 PROCEDURE — 99214 PR OFFICE/OUTPT VISIT, EST, LEVL IV, 30-39 MIN: ICD-10-PCS | Mod: 25,S$GLB,, | Performed by: STUDENT IN AN ORGANIZED HEALTH CARE EDUCATION/TRAINING PROGRAM

## 2021-05-04 PROCEDURE — 99999 PR PBB SHADOW E&M-EST. PATIENT-LVL III: CPT | Mod: PBBFAC,,, | Performed by: STUDENT IN AN ORGANIZED HEALTH CARE EDUCATION/TRAINING PROGRAM

## 2021-05-04 PROCEDURE — 3008F BODY MASS INDEX DOCD: CPT | Mod: CPTII,S$GLB,, | Performed by: STUDENT IN AN ORGANIZED HEALTH CARE EDUCATION/TRAINING PROGRAM

## 2021-05-04 PROCEDURE — 99214 OFFICE O/P EST MOD 30 MIN: CPT | Mod: 25,S$GLB,, | Performed by: STUDENT IN AN ORGANIZED HEALTH CARE EDUCATION/TRAINING PROGRAM

## 2021-05-04 PROCEDURE — 99999 PR PBB SHADOW E&M-EST. PATIENT-LVL III: ICD-10-PCS | Mod: PBBFAC,,, | Performed by: STUDENT IN AN ORGANIZED HEALTH CARE EDUCATION/TRAINING PROGRAM

## 2021-05-10 ENCOUNTER — CLINICAL SUPPORT (OUTPATIENT)
Dept: REHABILITATION | Facility: HOSPITAL | Age: 61
End: 2021-05-10
Attending: STUDENT IN AN ORGANIZED HEALTH CARE EDUCATION/TRAINING PROGRAM
Payer: COMMERCIAL

## 2021-05-10 DIAGNOSIS — Z74.09 DECREASED FUNCTIONAL MOBILITY AND ENDURANCE: ICD-10-CM

## 2021-05-10 DIAGNOSIS — R53.1 DECREASED STRENGTH: ICD-10-CM

## 2021-05-10 DIAGNOSIS — M25.651 DECREASED RANGE OF MOTION OF BOTH HIPS: ICD-10-CM

## 2021-05-10 DIAGNOSIS — M25.652 DECREASED RANGE OF MOTION OF BOTH HIPS: ICD-10-CM

## 2021-05-10 PROCEDURE — 97110 THERAPEUTIC EXERCISES: CPT | Mod: PO

## 2021-05-11 ENCOUNTER — OFFICE VISIT (OUTPATIENT)
Dept: PODIATRY | Facility: CLINIC | Age: 61
End: 2021-05-11
Payer: COMMERCIAL

## 2021-05-11 VITALS
SYSTOLIC BLOOD PRESSURE: 127 MMHG | WEIGHT: 223 LBS | DIASTOLIC BLOOD PRESSURE: 73 MMHG | HEART RATE: 78 BPM | HEIGHT: 75 IN | BODY MASS INDEX: 27.73 KG/M2

## 2021-05-11 DIAGNOSIS — L84 PRE-ULCERATIVE CALLUSES: Primary | ICD-10-CM

## 2021-05-11 DIAGNOSIS — I73.9 PVD (PERIPHERAL VASCULAR DISEASE): ICD-10-CM

## 2021-05-11 DIAGNOSIS — M87.052 AVASCULAR NECROSIS OF BONE OF HIP, LEFT: ICD-10-CM

## 2021-05-11 DIAGNOSIS — L97.509 ULCER OF FOOT, UNSPECIFIED LATERALITY, UNSPECIFIED ULCER STAGE: ICD-10-CM

## 2021-05-11 DIAGNOSIS — L84 CORN OR CALLUS: ICD-10-CM

## 2021-05-11 PROCEDURE — 3008F PR BODY MASS INDEX (BMI) DOCUMENTED: ICD-10-PCS | Mod: CPTII,S$GLB,, | Performed by: STUDENT IN AN ORGANIZED HEALTH CARE EDUCATION/TRAINING PROGRAM

## 2021-05-11 PROCEDURE — 99499 UNLISTED E&M SERVICE: CPT | Mod: S$GLB,,, | Performed by: STUDENT IN AN ORGANIZED HEALTH CARE EDUCATION/TRAINING PROGRAM

## 2021-05-11 PROCEDURE — 1126F AMNT PAIN NOTED NONE PRSNT: CPT | Mod: S$GLB,,, | Performed by: STUDENT IN AN ORGANIZED HEALTH CARE EDUCATION/TRAINING PROGRAM

## 2021-05-11 PROCEDURE — 3008F BODY MASS INDEX DOCD: CPT | Mod: CPTII,S$GLB,, | Performed by: STUDENT IN AN ORGANIZED HEALTH CARE EDUCATION/TRAINING PROGRAM

## 2021-05-11 PROCEDURE — 11042 DBRDMT SUBQ TIS 1ST 20SQCM/<: CPT | Mod: S$GLB,,, | Performed by: STUDENT IN AN ORGANIZED HEALTH CARE EDUCATION/TRAINING PROGRAM

## 2021-05-11 PROCEDURE — 99999 PR PBB SHADOW E&M-EST. PATIENT-LVL III: CPT | Mod: PBBFAC,,, | Performed by: STUDENT IN AN ORGANIZED HEALTH CARE EDUCATION/TRAINING PROGRAM

## 2021-05-11 PROCEDURE — 99999 PR PBB SHADOW E&M-EST. PATIENT-LVL III: ICD-10-PCS | Mod: PBBFAC,,, | Performed by: STUDENT IN AN ORGANIZED HEALTH CARE EDUCATION/TRAINING PROGRAM

## 2021-05-11 PROCEDURE — 99499 NO LOS: ICD-10-PCS | Mod: S$GLB,,, | Performed by: STUDENT IN AN ORGANIZED HEALTH CARE EDUCATION/TRAINING PROGRAM

## 2021-05-11 PROCEDURE — 11042 WOUND DEBRIDEMENT: ICD-10-PCS | Mod: S$GLB,,, | Performed by: STUDENT IN AN ORGANIZED HEALTH CARE EDUCATION/TRAINING PROGRAM

## 2021-05-11 PROCEDURE — 1126F PR PAIN SEVERITY QUANTIFIED, NO PAIN PRESENT: ICD-10-PCS | Mod: S$GLB,,, | Performed by: STUDENT IN AN ORGANIZED HEALTH CARE EDUCATION/TRAINING PROGRAM

## 2021-05-11 RX ORDER — ROSUVASTATIN CALCIUM 20 MG/1
20 TABLET, COATED ORAL NIGHTLY
COMMUNITY
Start: 2021-02-05 | End: 2022-01-11

## 2021-05-12 ENCOUNTER — CLINICAL SUPPORT (OUTPATIENT)
Dept: REHABILITATION | Facility: HOSPITAL | Age: 61
End: 2021-05-12
Attending: STUDENT IN AN ORGANIZED HEALTH CARE EDUCATION/TRAINING PROGRAM
Payer: COMMERCIAL

## 2021-05-12 DIAGNOSIS — R53.1 DECREASED STRENGTH: ICD-10-CM

## 2021-05-12 DIAGNOSIS — Z74.09 DECREASED FUNCTIONAL MOBILITY AND ENDURANCE: ICD-10-CM

## 2021-05-12 DIAGNOSIS — M25.651 DECREASED RANGE OF MOTION OF BOTH HIPS: ICD-10-CM

## 2021-05-12 DIAGNOSIS — M25.652 DECREASED RANGE OF MOTION OF BOTH HIPS: ICD-10-CM

## 2021-05-12 PROCEDURE — 97530 THERAPEUTIC ACTIVITIES: CPT | Mod: PO

## 2021-05-12 PROCEDURE — 97110 THERAPEUTIC EXERCISES: CPT | Mod: PO

## 2021-05-24 ENCOUNTER — CLINICAL SUPPORT (OUTPATIENT)
Dept: REHABILITATION | Facility: HOSPITAL | Age: 61
End: 2021-05-24
Attending: STUDENT IN AN ORGANIZED HEALTH CARE EDUCATION/TRAINING PROGRAM
Payer: COMMERCIAL

## 2021-05-24 DIAGNOSIS — R53.1 DECREASED STRENGTH: ICD-10-CM

## 2021-05-24 DIAGNOSIS — M25.651 DECREASED RANGE OF MOTION OF BOTH HIPS: ICD-10-CM

## 2021-05-24 DIAGNOSIS — M25.652 DECREASED RANGE OF MOTION OF BOTH HIPS: ICD-10-CM

## 2021-05-24 DIAGNOSIS — Z74.09 DECREASED FUNCTIONAL MOBILITY AND ENDURANCE: ICD-10-CM

## 2021-05-24 PROCEDURE — 97110 THERAPEUTIC EXERCISES: CPT | Mod: PO

## 2021-05-28 ENCOUNTER — HOSPITAL ENCOUNTER (OUTPATIENT)
Dept: WOUND CARE | Facility: HOSPITAL | Age: 61
Discharge: HOME | End: 2021-05-28
Attending: STUDENT IN AN ORGANIZED HEALTH CARE EDUCATION/TRAINING PROGRAM
Payer: COMMERCIAL

## 2021-05-28 VITALS
WEIGHT: 223 LBS | BODY MASS INDEX: 27.73 KG/M2 | HEART RATE: 73 BPM | SYSTOLIC BLOOD PRESSURE: 146 MMHG | DIASTOLIC BLOOD PRESSURE: 77 MMHG | HEIGHT: 75 IN | TEMPERATURE: 98 F

## 2021-05-28 DIAGNOSIS — M86.60 OTHER CHRONIC OSTEOMYELITIS, UNSPECIFIED SITE: ICD-10-CM

## 2021-05-28 DIAGNOSIS — I73.9 PVD (PERIPHERAL VASCULAR DISEASE): Primary | ICD-10-CM

## 2021-05-28 DIAGNOSIS — L97.509 ULCER OF TOE, UNSPECIFIED LATERALITY, UNSPECIFIED ULCER STAGE: ICD-10-CM

## 2021-05-28 PROCEDURE — 27201912 HC WOUND CARE DEBRIDEMENT SUPPLIES

## 2021-05-28 PROCEDURE — 11042 DEBRIDEMENT: ICD-10-PCS | Mod: ,,, | Performed by: STUDENT IN AN ORGANIZED HEALTH CARE EDUCATION/TRAINING PROGRAM

## 2021-05-28 PROCEDURE — 11042 DBRDMT SUBQ TIS 1ST 20SQCM/<: CPT | Mod: ,,, | Performed by: STUDENT IN AN ORGANIZED HEALTH CARE EDUCATION/TRAINING PROGRAM

## 2021-05-28 PROCEDURE — 99214 OFFICE O/P EST MOD 30 MIN: CPT | Mod: 25,,, | Performed by: STUDENT IN AN ORGANIZED HEALTH CARE EDUCATION/TRAINING PROGRAM

## 2021-05-28 PROCEDURE — 99203 OFFICE O/P NEW LOW 30 MIN: CPT

## 2021-05-28 PROCEDURE — 99214 PR OFFICE/OUTPT VISIT, EST, LEVL IV, 30-39 MIN: ICD-10-PCS | Mod: 25,,, | Performed by: STUDENT IN AN ORGANIZED HEALTH CARE EDUCATION/TRAINING PROGRAM

## 2021-05-28 PROCEDURE — 11042 DBRDMT SUBQ TIS 1ST 20SQCM/<: CPT

## 2021-05-31 ENCOUNTER — CLINICAL SUPPORT (OUTPATIENT)
Dept: REHABILITATION | Facility: HOSPITAL | Age: 61
End: 2021-05-31
Attending: STUDENT IN AN ORGANIZED HEALTH CARE EDUCATION/TRAINING PROGRAM
Payer: COMMERCIAL

## 2021-05-31 DIAGNOSIS — Z74.09 DECREASED FUNCTIONAL MOBILITY AND ENDURANCE: ICD-10-CM

## 2021-05-31 DIAGNOSIS — M25.652 DECREASED RANGE OF MOTION OF BOTH HIPS: ICD-10-CM

## 2021-05-31 DIAGNOSIS — M25.651 DECREASED RANGE OF MOTION OF BOTH HIPS: ICD-10-CM

## 2021-05-31 DIAGNOSIS — R53.1 DECREASED STRENGTH: ICD-10-CM

## 2021-05-31 PROCEDURE — 97110 THERAPEUTIC EXERCISES: CPT | Mod: PO

## 2021-06-01 ENCOUNTER — TELEPHONE (OUTPATIENT)
Dept: PODIATRY | Facility: CLINIC | Age: 61
End: 2021-06-01

## 2021-06-04 ENCOUNTER — HOSPITAL ENCOUNTER (OUTPATIENT)
Dept: WOUND CARE | Facility: HOSPITAL | Age: 61
Discharge: HOME | End: 2021-06-04
Attending: STUDENT IN AN ORGANIZED HEALTH CARE EDUCATION/TRAINING PROGRAM
Payer: COMMERCIAL

## 2021-06-04 DIAGNOSIS — M87.052 IDIOPATHIC ASEPTIC NECROSIS OF LEFT FEMUR: ICD-10-CM

## 2021-06-04 PROCEDURE — 99212 OFFICE O/P EST SF 10 MIN: CPT

## 2021-06-08 ENCOUNTER — HOSPITAL ENCOUNTER (OUTPATIENT)
Dept: RADIOLOGY | Facility: HOSPITAL | Age: 61
Discharge: HOME | End: 2021-06-08
Attending: STUDENT IN AN ORGANIZED HEALTH CARE EDUCATION/TRAINING PROGRAM
Payer: COMMERCIAL

## 2021-06-08 DIAGNOSIS — M86.60 OTHER CHRONIC OSTEOMYELITIS, UNSPECIFIED SITE: ICD-10-CM

## 2021-06-08 PROCEDURE — 73718 MRI LOWER EXTREMITY W/O DYE: CPT | Mod: 26,RT,, | Performed by: RADIOLOGY

## 2021-06-08 PROCEDURE — 73718 MRI LOWER EXTREMITY W/O DYE: CPT | Mod: TC,RT

## 2021-06-08 PROCEDURE — 73718 MRI FOOT (FOREFOOT) RIGHT WITHOUT CONTRAST: ICD-10-PCS | Mod: 26,RT,, | Performed by: RADIOLOGY

## 2021-06-11 ENCOUNTER — HOSPITAL ENCOUNTER (OUTPATIENT)
Dept: WOUND CARE | Facility: HOSPITAL | Age: 61
Discharge: HOME | End: 2021-06-11
Attending: STUDENT IN AN ORGANIZED HEALTH CARE EDUCATION/TRAINING PROGRAM
Payer: COMMERCIAL

## 2021-06-11 VITALS
WEIGHT: 223 LBS | BODY MASS INDEX: 27.73 KG/M2 | DIASTOLIC BLOOD PRESSURE: 67 MMHG | SYSTOLIC BLOOD PRESSURE: 149 MMHG | HEART RATE: 86 BPM | HEIGHT: 75 IN | TEMPERATURE: 99 F

## 2021-06-11 DIAGNOSIS — M20.5X9 MALLET TOE, UNSPECIFIED LATERALITY: ICD-10-CM

## 2021-06-11 DIAGNOSIS — L97.509 ULCER OF TOE, UNSPECIFIED LATERALITY, UNSPECIFIED ULCER STAGE: Primary | ICD-10-CM

## 2021-06-11 PROCEDURE — 27201912 HC WOUND CARE DEBRIDEMENT SUPPLIES

## 2021-06-11 PROCEDURE — 99214 OFFICE O/P EST MOD 30 MIN: CPT | Mod: 25,,, | Performed by: STUDENT IN AN ORGANIZED HEALTH CARE EDUCATION/TRAINING PROGRAM

## 2021-06-11 PROCEDURE — 11042 DEBRIDEMENT: ICD-10-PCS | Mod: ,,, | Performed by: STUDENT IN AN ORGANIZED HEALTH CARE EDUCATION/TRAINING PROGRAM

## 2021-06-11 PROCEDURE — 11042 DBRDMT SUBQ TIS 1ST 20SQCM/<: CPT

## 2021-06-11 PROCEDURE — 11042 DBRDMT SUBQ TIS 1ST 20SQCM/<: CPT | Mod: ,,, | Performed by: STUDENT IN AN ORGANIZED HEALTH CARE EDUCATION/TRAINING PROGRAM

## 2021-06-11 PROCEDURE — 99214 PR OFFICE/OUTPT VISIT, EST, LEVL IV, 30-39 MIN: ICD-10-PCS | Mod: 25,,, | Performed by: STUDENT IN AN ORGANIZED HEALTH CARE EDUCATION/TRAINING PROGRAM

## 2021-06-14 DIAGNOSIS — L97.509 ULCER OF TOE, UNSPECIFIED LATERALITY, UNSPECIFIED ULCER STAGE: Primary | ICD-10-CM

## 2021-06-16 ENCOUNTER — PATIENT MESSAGE (OUTPATIENT)
Dept: WOUND CARE | Facility: HOSPITAL | Age: 61
End: 2021-06-16

## 2021-06-25 ENCOUNTER — HOSPITAL ENCOUNTER (OUTPATIENT)
Dept: WOUND CARE | Facility: HOSPITAL | Age: 61
Discharge: HOME | End: 2021-06-25
Attending: STUDENT IN AN ORGANIZED HEALTH CARE EDUCATION/TRAINING PROGRAM
Payer: COMMERCIAL

## 2021-06-25 VITALS
HEIGHT: 75 IN | BODY MASS INDEX: 27.73 KG/M2 | SYSTOLIC BLOOD PRESSURE: 163 MMHG | WEIGHT: 223 LBS | HEART RATE: 69 BPM | DIASTOLIC BLOOD PRESSURE: 88 MMHG | TEMPERATURE: 99 F

## 2021-06-25 DIAGNOSIS — L97.509 ULCER OF TOE, UNSPECIFIED LATERALITY, UNSPECIFIED ULCER STAGE: Primary | ICD-10-CM

## 2021-06-25 DIAGNOSIS — I73.9 PVD (PERIPHERAL VASCULAR DISEASE): ICD-10-CM

## 2021-06-25 DIAGNOSIS — M20.5X9 MALLET TOE, UNSPECIFIED LATERALITY: ICD-10-CM

## 2021-06-25 PROCEDURE — 99214 OFFICE O/P EST MOD 30 MIN: CPT | Mod: 25,,, | Performed by: STUDENT IN AN ORGANIZED HEALTH CARE EDUCATION/TRAINING PROGRAM

## 2021-06-25 PROCEDURE — 99214 PR OFFICE/OUTPT VISIT, EST, LEVL IV, 30-39 MIN: ICD-10-PCS | Mod: 25,,, | Performed by: STUDENT IN AN ORGANIZED HEALTH CARE EDUCATION/TRAINING PROGRAM

## 2021-06-25 PROCEDURE — 11042 DBRDMT SUBQ TIS 1ST 20SQCM/<: CPT | Mod: ,,, | Performed by: STUDENT IN AN ORGANIZED HEALTH CARE EDUCATION/TRAINING PROGRAM

## 2021-06-25 PROCEDURE — 11042 DBRDMT SUBQ TIS 1ST 20SQCM/<: CPT

## 2021-06-25 PROCEDURE — 11042 DEBRIDEMENT: ICD-10-PCS | Mod: ,,, | Performed by: STUDENT IN AN ORGANIZED HEALTH CARE EDUCATION/TRAINING PROGRAM

## 2021-06-25 PROCEDURE — 27201912 HC WOUND CARE DEBRIDEMENT SUPPLIES

## 2021-07-02 ENCOUNTER — HOSPITAL ENCOUNTER (OUTPATIENT)
Dept: WOUND CARE | Facility: HOSPITAL | Age: 61
Discharge: HOME | End: 2021-07-02
Attending: STUDENT IN AN ORGANIZED HEALTH CARE EDUCATION/TRAINING PROGRAM
Payer: COMMERCIAL

## 2021-07-02 DIAGNOSIS — L97.509 ULCER OF TOE, UNSPECIFIED LATERALITY, UNSPECIFIED ULCER STAGE: Primary | ICD-10-CM

## 2021-07-02 PROCEDURE — 99213 OFFICE O/P EST LOW 20 MIN: CPT

## 2021-07-09 ENCOUNTER — HOSPITAL ENCOUNTER (OUTPATIENT)
Dept: WOUND CARE | Facility: HOSPITAL | Age: 61
Discharge: HOME | End: 2021-07-09
Attending: STUDENT IN AN ORGANIZED HEALTH CARE EDUCATION/TRAINING PROGRAM
Payer: COMMERCIAL

## 2021-07-09 VITALS
WEIGHT: 223 LBS | SYSTOLIC BLOOD PRESSURE: 125 MMHG | HEART RATE: 71 BPM | DIASTOLIC BLOOD PRESSURE: 61 MMHG | HEIGHT: 75 IN | TEMPERATURE: 97 F | BODY MASS INDEX: 27.73 KG/M2

## 2021-07-09 DIAGNOSIS — M25.652 DECREASED RANGE OF MOTION OF BOTH HIPS: ICD-10-CM

## 2021-07-09 DIAGNOSIS — M20.5X9 MALLET TOE, UNSPECIFIED LATERALITY: ICD-10-CM

## 2021-07-09 DIAGNOSIS — M87.052 AVASCULAR NECROSIS OF BONE OF HIP, LEFT: ICD-10-CM

## 2021-07-09 DIAGNOSIS — L97.509 ULCER OF TOE, UNSPECIFIED LATERALITY, UNSPECIFIED ULCER STAGE: Primary | ICD-10-CM

## 2021-07-09 DIAGNOSIS — I73.9 PVD (PERIPHERAL VASCULAR DISEASE): ICD-10-CM

## 2021-07-09 DIAGNOSIS — M25.651 DECREASED RANGE OF MOTION OF BOTH HIPS: ICD-10-CM

## 2021-07-09 PROCEDURE — 99213 PR OFFICE/OUTPT VISIT, EST, LEVL III, 20-29 MIN: ICD-10-PCS | Mod: 25,,, | Performed by: STUDENT IN AN ORGANIZED HEALTH CARE EDUCATION/TRAINING PROGRAM

## 2021-07-09 PROCEDURE — 99213 OFFICE O/P EST LOW 20 MIN: CPT | Mod: 25,,, | Performed by: STUDENT IN AN ORGANIZED HEALTH CARE EDUCATION/TRAINING PROGRAM

## 2021-07-09 PROCEDURE — 97597 DBRDMT OPN WND 1ST 20 CM/<: CPT

## 2021-07-09 PROCEDURE — 27201912 HC WOUND CARE DEBRIDEMENT SUPPLIES

## 2021-07-09 PROCEDURE — 11042 DBRDMT SUBQ TIS 1ST 20SQCM/<: CPT

## 2021-07-15 ENCOUNTER — OFFICE VISIT (OUTPATIENT)
Dept: PODIATRY | Facility: CLINIC | Age: 61
End: 2021-07-15
Payer: COMMERCIAL

## 2021-07-15 VITALS
HEART RATE: 72 BPM | SYSTOLIC BLOOD PRESSURE: 119 MMHG | HEIGHT: 75 IN | BODY MASS INDEX: 27.73 KG/M2 | DIASTOLIC BLOOD PRESSURE: 76 MMHG | WEIGHT: 223 LBS

## 2021-07-15 DIAGNOSIS — M20.30 HALLUX MALLEUS, UNSPECIFIED LATERALITY: Primary | ICD-10-CM

## 2021-07-15 DIAGNOSIS — L97.512 ULCER OF GREAT TOE, RIGHT, WITH FAT LAYER EXPOSED: ICD-10-CM

## 2021-07-15 PROCEDURE — 99214 OFFICE O/P EST MOD 30 MIN: CPT | Mod: 25,S$GLB,, | Performed by: PODIATRIST

## 2021-07-15 PROCEDURE — 99999 PR PBB SHADOW E&M-EST. PATIENT-LVL IV: ICD-10-PCS | Mod: PBBFAC,,, | Performed by: PODIATRIST

## 2021-07-15 PROCEDURE — 11042 WOUND DEBRIDEMENT: ICD-10-PCS | Mod: S$GLB,,, | Performed by: PODIATRIST

## 2021-07-15 PROCEDURE — 1126F PR PAIN SEVERITY QUANTIFIED, NO PAIN PRESENT: ICD-10-PCS | Mod: S$GLB,,, | Performed by: PODIATRIST

## 2021-07-15 PROCEDURE — 11042 DBRDMT SUBQ TIS 1ST 20SQCM/<: CPT | Mod: S$GLB,,, | Performed by: PODIATRIST

## 2021-07-15 PROCEDURE — 3008F PR BODY MASS INDEX (BMI) DOCUMENTED: ICD-10-PCS | Mod: CPTII,S$GLB,, | Performed by: PODIATRIST

## 2021-07-15 PROCEDURE — 1126F AMNT PAIN NOTED NONE PRSNT: CPT | Mod: S$GLB,,, | Performed by: PODIATRIST

## 2021-07-15 PROCEDURE — 99999 PR PBB SHADOW E&M-EST. PATIENT-LVL IV: CPT | Mod: PBBFAC,,, | Performed by: PODIATRIST

## 2021-07-15 PROCEDURE — 3008F BODY MASS INDEX DOCD: CPT | Mod: CPTII,S$GLB,, | Performed by: PODIATRIST

## 2021-07-15 PROCEDURE — 99214 PR OFFICE/OUTPT VISIT, EST, LEVL IV, 30-39 MIN: ICD-10-PCS | Mod: 25,S$GLB,, | Performed by: PODIATRIST

## 2021-07-30 ENCOUNTER — HOSPITAL ENCOUNTER (OUTPATIENT)
Dept: RADIOLOGY | Facility: HOSPITAL | Age: 61
Discharge: HOME | End: 2021-07-30
Attending: PODIATRIST
Payer: COMMERCIAL

## 2021-07-30 ENCOUNTER — LAB VISIT (OUTPATIENT)
Dept: LAB | Facility: HOSPITAL | Age: 61
End: 2021-07-30
Attending: PODIATRIST
Payer: COMMERCIAL

## 2021-07-30 ENCOUNTER — OFFICE VISIT (OUTPATIENT)
Dept: PODIATRY | Facility: CLINIC | Age: 61
End: 2021-07-30
Payer: COMMERCIAL

## 2021-07-30 VITALS — HEIGHT: 75 IN | BODY MASS INDEX: 27.74 KG/M2 | WEIGHT: 223.13 LBS

## 2021-07-30 DIAGNOSIS — L84 PRE-ULCERATIVE CALLUSES: ICD-10-CM

## 2021-07-30 DIAGNOSIS — G60.9 IDIOPATHIC PERIPHERAL NEUROPATHY: ICD-10-CM

## 2021-07-30 DIAGNOSIS — M20.30 HALLUX MALLEUS, UNSPECIFIED LATERALITY: ICD-10-CM

## 2021-07-30 DIAGNOSIS — I73.9 PVD (PERIPHERAL VASCULAR DISEASE): ICD-10-CM

## 2021-07-30 DIAGNOSIS — Z87.2 HEALED ULCER OF RIGHT FOOT ON EXAMINATION: Primary | ICD-10-CM

## 2021-07-30 DIAGNOSIS — B35.1 ONYCHOMYCOSIS: ICD-10-CM

## 2021-07-30 DIAGNOSIS — L97.512 ULCER OF GREAT TOE, RIGHT, WITH FAT LAYER EXPOSED: ICD-10-CM

## 2021-07-30 LAB
CRP SERPL-MCNC: 1.7 MG/L (ref 0–8.2)
ERYTHROCYTE [SEDIMENTATION RATE] IN BLOOD BY WESTERGREN METHOD: 5 MM/HR (ref 0–10)
ESTIMATED AVG GLUCOSE: 126 MG/DL (ref 68–131)
HBA1C MFR BLD: 6 % (ref 4–5.6)

## 2021-07-30 PROCEDURE — 11721 ROUTINE FOOT CARE: ICD-10-PCS | Mod: 59,S$GLB,, | Performed by: PODIATRIST

## 2021-07-30 PROCEDURE — 73630 X-RAY EXAM OF FOOT: CPT | Mod: TC,FY,RT

## 2021-07-30 PROCEDURE — 85652 RBC SED RATE AUTOMATED: CPT | Performed by: PODIATRIST

## 2021-07-30 PROCEDURE — 1160F RVW MEDS BY RX/DR IN RCRD: CPT | Mod: CPTII,S$GLB,, | Performed by: PODIATRIST

## 2021-07-30 PROCEDURE — 11056 PARNG/CUTG B9 HYPRKR LES 2-4: CPT | Mod: S$GLB,,, | Performed by: PODIATRIST

## 2021-07-30 PROCEDURE — 3008F PR BODY MASS INDEX (BMI) DOCUMENTED: ICD-10-PCS | Mod: CPTII,S$GLB,, | Performed by: PODIATRIST

## 2021-07-30 PROCEDURE — 36415 COLL VENOUS BLD VENIPUNCTURE: CPT | Performed by: PODIATRIST

## 2021-07-30 PROCEDURE — 1159F MED LIST DOCD IN RCRD: CPT | Mod: CPTII,S$GLB,, | Performed by: PODIATRIST

## 2021-07-30 PROCEDURE — 1159F PR MEDICATION LIST DOCUMENTED IN MEDICAL RECORD: ICD-10-PCS | Mod: CPTII,S$GLB,, | Performed by: PODIATRIST

## 2021-07-30 PROCEDURE — 1126F AMNT PAIN NOTED NONE PRSNT: CPT | Mod: CPTII,S$GLB,, | Performed by: PODIATRIST

## 2021-07-30 PROCEDURE — 73630 XR FOOT COMPLETE 3 VIEW RIGHT: ICD-10-PCS | Mod: 26,RT,, | Performed by: INTERNAL MEDICINE

## 2021-07-30 PROCEDURE — 83036 HEMOGLOBIN GLYCOSYLATED A1C: CPT | Performed by: PODIATRIST

## 2021-07-30 PROCEDURE — 11056 ROUTINE FOOT CARE: ICD-10-PCS | Mod: S$GLB,,, | Performed by: PODIATRIST

## 2021-07-30 PROCEDURE — 99499 UNLISTED E&M SERVICE: CPT | Mod: S$GLB,,, | Performed by: PODIATRIST

## 2021-07-30 PROCEDURE — 73630 X-RAY EXAM OF FOOT: CPT | Mod: 26,RT,, | Performed by: INTERNAL MEDICINE

## 2021-07-30 PROCEDURE — 3044F PR MOST RECENT HEMOGLOBIN A1C LEVEL <7.0%: ICD-10-PCS | Mod: CPTII,S$GLB,, | Performed by: PODIATRIST

## 2021-07-30 PROCEDURE — 1160F PR REVIEW ALL MEDS BY PRESCRIBER/CLIN PHARMACIST DOCUMENTED: ICD-10-PCS | Mod: CPTII,S$GLB,, | Performed by: PODIATRIST

## 2021-07-30 PROCEDURE — 99999 PR PBB SHADOW E&M-EST. PATIENT-LVL III: CPT | Mod: PBBFAC,,, | Performed by: PODIATRIST

## 2021-07-30 PROCEDURE — 99999 PR PBB SHADOW E&M-EST. PATIENT-LVL III: ICD-10-PCS | Mod: PBBFAC,,, | Performed by: PODIATRIST

## 2021-07-30 PROCEDURE — 99499 NO LOS: ICD-10-PCS | Mod: S$GLB,,, | Performed by: PODIATRIST

## 2021-07-30 PROCEDURE — 3008F BODY MASS INDEX DOCD: CPT | Mod: CPTII,S$GLB,, | Performed by: PODIATRIST

## 2021-07-30 PROCEDURE — 3044F HG A1C LEVEL LT 7.0%: CPT | Mod: CPTII,S$GLB,, | Performed by: PODIATRIST

## 2021-07-30 PROCEDURE — 1126F PR PAIN SEVERITY QUANTIFIED, NO PAIN PRESENT: ICD-10-PCS | Mod: CPTII,S$GLB,, | Performed by: PODIATRIST

## 2021-07-30 PROCEDURE — 86140 C-REACTIVE PROTEIN: CPT | Performed by: PODIATRIST

## 2021-07-30 PROCEDURE — 11721 DEBRIDE NAIL 6 OR MORE: CPT | Mod: 59,S$GLB,, | Performed by: PODIATRIST

## 2021-08-02 ENCOUNTER — PATIENT MESSAGE (OUTPATIENT)
Dept: PODIATRY | Facility: CLINIC | Age: 61
End: 2021-08-02

## 2021-08-04 ENCOUNTER — OFFICE VISIT (OUTPATIENT)
Dept: URGENT CARE | Facility: CLINIC | Age: 61
End: 2021-08-04
Payer: COMMERCIAL

## 2021-08-04 VITALS
RESPIRATION RATE: 18 BRPM | TEMPERATURE: 97 F | SYSTOLIC BLOOD PRESSURE: 153 MMHG | BODY MASS INDEX: 27.73 KG/M2 | WEIGHT: 223 LBS | HEART RATE: 75 BPM | DIASTOLIC BLOOD PRESSURE: 95 MMHG | OXYGEN SATURATION: 98 % | HEIGHT: 75 IN

## 2021-08-04 DIAGNOSIS — U07.1 COVID-19: Primary | ICD-10-CM

## 2021-08-04 DIAGNOSIS — U07.1 COVID-19 VIRUS DETECTED: ICD-10-CM

## 2021-08-04 LAB
CTP QC/QA: YES
SARS-COV-2 RDRP RESP QL NAA+PROBE: POSITIVE

## 2021-08-04 PROCEDURE — U0002 COVID-19 LAB TEST NON-CDC: HCPCS | Mod: QW,S$GLB,, | Performed by: FAMILY MEDICINE

## 2021-08-04 PROCEDURE — 3077F SYST BP >= 140 MM HG: CPT | Mod: CPTII,S$GLB,, | Performed by: FAMILY MEDICINE

## 2021-08-04 PROCEDURE — 3080F DIAST BP >= 90 MM HG: CPT | Mod: CPTII,S$GLB,, | Performed by: FAMILY MEDICINE

## 2021-08-04 PROCEDURE — 1160F RVW MEDS BY RX/DR IN RCRD: CPT | Mod: CPTII,S$GLB,, | Performed by: FAMILY MEDICINE

## 2021-08-04 PROCEDURE — 1160F PR REVIEW ALL MEDS BY PRESCRIBER/CLIN PHARMACIST DOCUMENTED: ICD-10-PCS | Mod: CPTII,S$GLB,, | Performed by: FAMILY MEDICINE

## 2021-08-04 PROCEDURE — 3077F PR MOST RECENT SYSTOLIC BLOOD PRESSURE >= 140 MM HG: ICD-10-PCS | Mod: CPTII,S$GLB,, | Performed by: FAMILY MEDICINE

## 2021-08-04 PROCEDURE — 1159F MED LIST DOCD IN RCRD: CPT | Mod: CPTII,S$GLB,, | Performed by: FAMILY MEDICINE

## 2021-08-04 PROCEDURE — 99214 PR OFFICE/OUTPT VISIT, EST, LEVL IV, 30-39 MIN: ICD-10-PCS | Mod: S$GLB,CS,, | Performed by: FAMILY MEDICINE

## 2021-08-04 PROCEDURE — 3080F PR MOST RECENT DIASTOLIC BLOOD PRESSURE >= 90 MM HG: ICD-10-PCS | Mod: CPTII,S$GLB,, | Performed by: FAMILY MEDICINE

## 2021-08-04 PROCEDURE — 99214 OFFICE O/P EST MOD 30 MIN: CPT | Mod: S$GLB,CS,, | Performed by: FAMILY MEDICINE

## 2021-08-04 PROCEDURE — 3044F HG A1C LEVEL LT 7.0%: CPT | Mod: CPTII,S$GLB,, | Performed by: FAMILY MEDICINE

## 2021-08-04 PROCEDURE — 3008F BODY MASS INDEX DOCD: CPT | Mod: CPTII,S$GLB,, | Performed by: FAMILY MEDICINE

## 2021-08-04 PROCEDURE — 3008F PR BODY MASS INDEX (BMI) DOCUMENTED: ICD-10-PCS | Mod: CPTII,S$GLB,, | Performed by: FAMILY MEDICINE

## 2021-08-04 PROCEDURE — U0002: ICD-10-PCS | Mod: QW,S$GLB,, | Performed by: FAMILY MEDICINE

## 2021-08-04 PROCEDURE — 1159F PR MEDICATION LIST DOCUMENTED IN MEDICAL RECORD: ICD-10-PCS | Mod: CPTII,S$GLB,, | Performed by: FAMILY MEDICINE

## 2021-08-04 PROCEDURE — 3044F PR MOST RECENT HEMOGLOBIN A1C LEVEL <7.0%: ICD-10-PCS | Mod: CPTII,S$GLB,, | Performed by: FAMILY MEDICINE

## 2021-08-06 ENCOUNTER — NURSE TRIAGE (OUTPATIENT)
Dept: ADMINISTRATIVE | Facility: CLINIC | Age: 61
End: 2021-08-06

## 2021-08-06 DIAGNOSIS — U07.1 COVID-19: Primary | ICD-10-CM

## 2021-08-09 ENCOUNTER — TELEPHONE (OUTPATIENT)
Dept: PODIATRY | Facility: CLINIC | Age: 61
End: 2021-08-09

## 2021-08-11 ENCOUNTER — INFUSION (OUTPATIENT)
Dept: INFECTIOUS DISEASES | Facility: HOSPITAL | Age: 61
End: 2021-08-11
Attending: INTERNAL MEDICINE
Payer: COMMERCIAL

## 2021-08-11 VITALS
RESPIRATION RATE: 20 BRPM | HEIGHT: 76 IN | TEMPERATURE: 99 F | WEIGHT: 220 LBS | HEART RATE: 81 BPM | OXYGEN SATURATION: 98 % | BODY MASS INDEX: 26.79 KG/M2 | DIASTOLIC BLOOD PRESSURE: 81 MMHG | SYSTOLIC BLOOD PRESSURE: 145 MMHG

## 2021-08-11 DIAGNOSIS — U07.1 COVID-19: Primary | ICD-10-CM

## 2021-08-11 PROCEDURE — 63600175 PHARM REV CODE 636 W HCPCS: Performed by: INTERNAL MEDICINE

## 2021-08-11 PROCEDURE — 25000003 PHARM REV CODE 250: Performed by: INTERNAL MEDICINE

## 2021-08-11 PROCEDURE — M0243 CASIRIVI AND IMDEVI INFUSION: HCPCS | Performed by: INTERNAL MEDICINE

## 2021-08-11 RX ORDER — SODIUM CHLORIDE 0.9 % (FLUSH) 0.9 %
10 SYRINGE (ML) INJECTION
Status: DISCONTINUED | OUTPATIENT
Start: 2021-08-11 | End: 2021-10-05

## 2021-08-11 RX ORDER — ALBUTEROL SULFATE 90 UG/1
2 AEROSOL, METERED RESPIRATORY (INHALATION)
Status: DISCONTINUED | OUTPATIENT
Start: 2021-08-11 | End: 2021-10-05

## 2021-08-11 RX ORDER — ACETAMINOPHEN 325 MG/1
650 TABLET ORAL ONCE AS NEEDED
Status: DISCONTINUED | OUTPATIENT
Start: 2021-08-11 | End: 2021-10-05

## 2021-08-11 RX ORDER — EPINEPHRINE 0.3 MG/.3ML
0.3 INJECTION SUBCUTANEOUS
Status: DISCONTINUED | OUTPATIENT
Start: 2021-08-11 | End: 2021-10-05

## 2021-08-11 RX ORDER — DIPHENHYDRAMINE HYDROCHLORIDE 50 MG/ML
25 INJECTION INTRAMUSCULAR; INTRAVENOUS ONCE AS NEEDED
Status: DISCONTINUED | OUTPATIENT
Start: 2021-08-11 | End: 2021-10-05

## 2021-08-11 RX ORDER — ONDANSETRON 4 MG/1
4 TABLET, ORALLY DISINTEGRATING ORAL ONCE AS NEEDED
Status: DISCONTINUED | OUTPATIENT
Start: 2021-08-11 | End: 2021-10-05

## 2021-08-11 RX ADMIN — CASIRIVIMAB AND IMDEVIMAB 600 MG: 600; 600 INJECTION, SOLUTION, CONCENTRATE INTRAVENOUS at 01:08

## 2021-10-05 ENCOUNTER — HOSPITAL ENCOUNTER (OUTPATIENT)
Dept: RADIOLOGY | Facility: HOSPITAL | Age: 61
Discharge: HOME | End: 2021-10-05
Attending: INTERNAL MEDICINE
Payer: COMMERCIAL

## 2021-10-05 DIAGNOSIS — M87.052 AVASCULAR NECROSIS OF BONE OF HIP, LEFT: ICD-10-CM

## 2021-10-05 DIAGNOSIS — Z96.641 STATUS POST RIGHT HIP REPLACEMENT: ICD-10-CM

## 2021-10-05 DIAGNOSIS — M54.9 DORSALGIA, UNSPECIFIED: ICD-10-CM

## 2021-10-05 PROCEDURE — 72100 X-RAY EXAM L-S SPINE 2/3 VWS: CPT | Mod: 26,,, | Performed by: RADIOLOGY

## 2021-10-05 PROCEDURE — 73522 XR HIP 3 OR 4 VIEWS BILATERAL: ICD-10-PCS | Mod: 26,,, | Performed by: RADIOLOGY

## 2021-10-05 PROCEDURE — 73522 X-RAY EXAM HIPS BI 3-4 VIEWS: CPT | Mod: TC,FY

## 2021-10-05 PROCEDURE — 73522 X-RAY EXAM HIPS BI 3-4 VIEWS: CPT | Mod: 26,,, | Performed by: RADIOLOGY

## 2021-10-05 PROCEDURE — 72100 X-RAY EXAM L-S SPINE 2/3 VWS: CPT | Mod: TC,FY

## 2021-10-05 PROCEDURE — 72100 XR LUMBAR SPINE 2 OR 3 VIEWS: ICD-10-PCS | Mod: 26,,, | Performed by: RADIOLOGY

## 2022-01-10 ENCOUNTER — HOSPITAL ENCOUNTER (INPATIENT)
Facility: HOSPITAL | Age: 62
LOS: 4 days | Discharge: HOME HEALTH | DRG: 571 | End: 2022-01-16
Attending: EMERGENCY MEDICINE | Admitting: HOSPITALIST
Payer: COMMERCIAL

## 2022-01-10 DIAGNOSIS — L03.031 CELLULITIS OF RIGHT TOE: Primary | ICD-10-CM

## 2022-01-10 DIAGNOSIS — M25.559 PAIN IN JOINT INVOLVING PELVIC REGION AND THIGH, UNSPECIFIED LATERALITY: ICD-10-CM

## 2022-01-10 DIAGNOSIS — R00.0 TACHYCARDIA: ICD-10-CM

## 2022-01-10 DIAGNOSIS — L03.031 CELLULITIS OF GREAT TOE OF RIGHT FOOT: ICD-10-CM

## 2022-01-10 DIAGNOSIS — R07.9 CHEST PAIN: ICD-10-CM

## 2022-01-10 DIAGNOSIS — I73.9 PERIPHERAL VASCULAR DISEASE: ICD-10-CM

## 2022-01-10 DIAGNOSIS — M87.052 AVASCULAR NECROSIS OF BONE OF HIP, LEFT: ICD-10-CM

## 2022-01-10 DIAGNOSIS — M86.9 OSTEOMYELITIS, UNSPECIFIED SITE, UNSPECIFIED TYPE: ICD-10-CM

## 2022-01-10 LAB
ALBUMIN SERPL BCP-MCNC: 4.1 G/DL (ref 3.5–5.2)
ALP SERPL-CCNC: 82 U/L (ref 55–135)
ALT SERPL W/O P-5'-P-CCNC: 36 U/L (ref 10–44)
ANION GAP SERPL CALC-SCNC: 13 MMOL/L (ref 8–16)
AST SERPL-CCNC: 22 U/L (ref 10–40)
BASOPHILS # BLD AUTO: 0.07 K/UL (ref 0–0.2)
BASOPHILS NFR BLD: 0.4 % (ref 0–1.9)
BILIRUB SERPL-MCNC: 0.4 MG/DL (ref 0.1–1)
BUN SERPL-MCNC: 19 MG/DL (ref 8–23)
CALCIUM SERPL-MCNC: 9.9 MG/DL (ref 8.7–10.5)
CHLORIDE SERPL-SCNC: 103 MMOL/L (ref 95–110)
CO2 SERPL-SCNC: 25 MMOL/L (ref 23–29)
CREAT SERPL-MCNC: 1.1 MG/DL (ref 0.5–1.4)
CRP SERPL-MCNC: 64.1 MG/L (ref 0–8.2)
DIFFERENTIAL METHOD: ABNORMAL
EOSINOPHIL # BLD AUTO: 0.3 K/UL (ref 0–0.5)
EOSINOPHIL NFR BLD: 1.5 % (ref 0–8)
ERYTHROCYTE [DISTWIDTH] IN BLOOD BY AUTOMATED COUNT: 14.4 % (ref 11.5–14.5)
ERYTHROCYTE [SEDIMENTATION RATE] IN BLOOD BY WESTERGREN METHOD: 34 MM/HR (ref 0–10)
EST. GFR  (AFRICAN AMERICAN): >60 ML/MIN/1.73 M^2
EST. GFR  (NON AFRICAN AMERICAN): >60 ML/MIN/1.73 M^2
GLUCOSE SERPL-MCNC: 106 MG/DL (ref 70–110)
HCT VFR BLD AUTO: 43.6 % (ref 40–54)
HGB BLD-MCNC: 14.2 G/DL (ref 14–18)
IMM GRANULOCYTES # BLD AUTO: 0.07 K/UL (ref 0–0.04)
IMM GRANULOCYTES NFR BLD AUTO: 0.4 % (ref 0–0.5)
LYMPHOCYTES # BLD AUTO: 2.5 K/UL (ref 1–4.8)
LYMPHOCYTES NFR BLD: 15 % (ref 18–48)
MCH RBC QN AUTO: 28.8 PG (ref 27–31)
MCHC RBC AUTO-ENTMCNC: 32.6 G/DL (ref 32–36)
MCV RBC AUTO: 88 FL (ref 82–98)
MONOCYTES # BLD AUTO: 1.6 K/UL (ref 0.3–1)
MONOCYTES NFR BLD: 9.4 % (ref 4–15)
NEUTROPHILS # BLD AUTO: 12.4 K/UL (ref 1.8–7.7)
NEUTROPHILS NFR BLD: 73.3 % (ref 38–73)
NRBC BLD-RTO: 0 /100 WBC
PLATELET # BLD AUTO: 198 K/UL (ref 150–450)
PMV BLD AUTO: 9.8 FL (ref 9.2–12.9)
POTASSIUM SERPL-SCNC: 4.1 MMOL/L (ref 3.5–5.1)
PROT SERPL-MCNC: 7.7 G/DL (ref 6–8.4)
RBC # BLD AUTO: 4.93 M/UL (ref 4.6–6.2)
SODIUM SERPL-SCNC: 141 MMOL/L (ref 136–145)
URATE SERPL-MCNC: 7.4 MG/DL (ref 3.4–7)
WBC # BLD AUTO: 16.89 K/UL (ref 3.9–12.7)

## 2022-01-10 PROCEDURE — 80053 COMPREHEN METABOLIC PANEL: CPT | Performed by: NURSE PRACTITIONER

## 2022-01-10 PROCEDURE — 82962 GLUCOSE BLOOD TEST: CPT

## 2022-01-10 PROCEDURE — 84550 ASSAY OF BLOOD/URIC ACID: CPT | Performed by: NURSE PRACTITIONER

## 2022-01-10 PROCEDURE — U0002 COVID-19 LAB TEST NON-CDC: HCPCS | Performed by: EMERGENCY MEDICINE

## 2022-01-10 PROCEDURE — 99285 EMERGENCY DEPT VISIT HI MDM: CPT | Mod: 25

## 2022-01-10 PROCEDURE — 85025 COMPLETE CBC W/AUTO DIFF WBC: CPT | Performed by: NURSE PRACTITIONER

## 2022-01-10 PROCEDURE — 85652 RBC SED RATE AUTOMATED: CPT | Performed by: NURSE PRACTITIONER

## 2022-01-10 PROCEDURE — 86140 C-REACTIVE PROTEIN: CPT | Performed by: NURSE PRACTITIONER

## 2022-01-10 RX ORDER — MORPHINE SULFATE 4 MG/ML
4 INJECTION, SOLUTION INTRAMUSCULAR; INTRAVENOUS
Status: COMPLETED | OUTPATIENT
Start: 2022-01-10 | End: 2022-01-11

## 2022-01-11 PROBLEM — L03.031 CELLULITIS OF GREAT TOE OF RIGHT FOOT: Status: ACTIVE | Noted: 2022-01-11

## 2022-01-11 PROBLEM — M10.9 GOUT: Status: ACTIVE | Noted: 2022-01-11

## 2022-01-11 PROBLEM — E11.51 TYPE 2 DIABETES MELLITUS WITH DIABETIC PERIPHERAL ANGIOPATHY WITHOUT GANGRENE: Status: ACTIVE | Noted: 2020-08-04

## 2022-01-11 LAB
ANION GAP SERPL CALC-SCNC: 10 MMOL/L (ref 8–16)
BASOPHILS # BLD AUTO: 0.07 K/UL (ref 0–0.2)
BASOPHILS NFR BLD: 0.5 % (ref 0–1.9)
BUN SERPL-MCNC: 21 MG/DL (ref 8–23)
CALCIUM SERPL-MCNC: 8.8 MG/DL (ref 8.7–10.5)
CHLORIDE SERPL-SCNC: 104 MMOL/L (ref 95–110)
CO2 SERPL-SCNC: 24 MMOL/L (ref 23–29)
CREAT SERPL-MCNC: 0.9 MG/DL (ref 0.5–1.4)
CTP QC/QA: YES
DIFFERENTIAL METHOD: ABNORMAL
EOSINOPHIL # BLD AUTO: 0.3 K/UL (ref 0–0.5)
EOSINOPHIL NFR BLD: 1.8 % (ref 0–8)
ERYTHROCYTE [DISTWIDTH] IN BLOOD BY AUTOMATED COUNT: 14.5 % (ref 11.5–14.5)
EST. GFR  (AFRICAN AMERICAN): >60 ML/MIN/1.73 M^2
EST. GFR  (NON AFRICAN AMERICAN): >60 ML/MIN/1.73 M^2
ESTIMATED AVG GLUCOSE: 126 MG/DL (ref 68–131)
GLUCOSE SERPL-MCNC: 125 MG/DL (ref 70–110)
GLUCOSE SERPL-MCNC: 98 MG/DL (ref 70–110)
HBA1C MFR BLD: 6 % (ref 4–5.6)
HCT VFR BLD AUTO: 38.8 % (ref 40–54)
HGB BLD-MCNC: 12.5 G/DL (ref 14–18)
IMM GRANULOCYTES # BLD AUTO: 0.09 K/UL (ref 0–0.04)
IMM GRANULOCYTES NFR BLD AUTO: 0.7 % (ref 0–0.5)
INR PPP: 0.9 (ref 0.8–1.2)
LYMPHOCYTES # BLD AUTO: 2.8 K/UL (ref 1–4.8)
LYMPHOCYTES NFR BLD: 20.5 % (ref 18–48)
MAGNESIUM SERPL-MCNC: 2.2 MG/DL (ref 1.6–2.6)
MCH RBC QN AUTO: 28.6 PG (ref 27–31)
MCHC RBC AUTO-ENTMCNC: 32.2 G/DL (ref 32–36)
MCV RBC AUTO: 89 FL (ref 82–98)
MONOCYTES # BLD AUTO: 1.3 K/UL (ref 0.3–1)
MONOCYTES NFR BLD: 9.3 % (ref 4–15)
NEUTROPHILS # BLD AUTO: 9.2 K/UL (ref 1.8–7.7)
NEUTROPHILS NFR BLD: 67.2 % (ref 38–73)
NRBC BLD-RTO: 0 /100 WBC
PLATELET # BLD AUTO: 181 K/UL (ref 150–450)
PMV BLD AUTO: 10.3 FL (ref 9.2–12.9)
POCT GLUCOSE: 119 MG/DL (ref 70–110)
POCT GLUCOSE: 98 MG/DL (ref 70–110)
POTASSIUM SERPL-SCNC: 4 MMOL/L (ref 3.5–5.1)
PROTHROMBIN TIME: 10 SEC (ref 9–12.5)
RBC # BLD AUTO: 4.37 M/UL (ref 4.6–6.2)
SARS-COV-2 RDRP RESP QL NAA+PROBE: NEGATIVE
SODIUM SERPL-SCNC: 138 MMOL/L (ref 136–145)
WBC # BLD AUTO: 13.66 K/UL (ref 3.9–12.7)

## 2022-01-11 PROCEDURE — 85610 PROTHROMBIN TIME: CPT | Performed by: NURSE PRACTITIONER

## 2022-01-11 PROCEDURE — 87075 CULTR BACTERIA EXCEPT BLOOD: CPT | Performed by: STUDENT IN AN ORGANIZED HEALTH CARE EDUCATION/TRAINING PROGRAM

## 2022-01-11 PROCEDURE — 80048 BASIC METABOLIC PNL TOTAL CA: CPT | Performed by: NURSE PRACTITIONER

## 2022-01-11 PROCEDURE — 25000003 PHARM REV CODE 250: Performed by: NURSE PRACTITIONER

## 2022-01-11 PROCEDURE — 83036 HEMOGLOBIN GLYCOSYLATED A1C: CPT | Performed by: NURSE PRACTITIONER

## 2022-01-11 PROCEDURE — 25000003 PHARM REV CODE 250: Performed by: HOSPITALIST

## 2022-01-11 PROCEDURE — 11043 DBRDMT MUSC&/FSCA 1ST 20/<: CPT | Mod: ,,, | Performed by: STUDENT IN AN ORGANIZED HEALTH CARE EDUCATION/TRAINING PROGRAM

## 2022-01-11 PROCEDURE — 11043 DEBRIDEMENT: ICD-10-PCS | Mod: ,,, | Performed by: STUDENT IN AN ORGANIZED HEALTH CARE EDUCATION/TRAINING PROGRAM

## 2022-01-11 PROCEDURE — G0378 HOSPITAL OBSERVATION PER HR: HCPCS

## 2022-01-11 PROCEDURE — 25000003 PHARM REV CODE 250: Performed by: EMERGENCY MEDICINE

## 2022-01-11 PROCEDURE — 85025 COMPLETE CBC W/AUTO DIFF WBC: CPT | Performed by: NURSE PRACTITIONER

## 2022-01-11 PROCEDURE — 87040 BLOOD CULTURE FOR BACTERIA: CPT | Performed by: EMERGENCY MEDICINE

## 2022-01-11 PROCEDURE — 99213 PR OFFICE/OUTPT VISIT, EST, LEVL III, 20-29 MIN: ICD-10-PCS | Mod: 25,,, | Performed by: STUDENT IN AN ORGANIZED HEALTH CARE EDUCATION/TRAINING PROGRAM

## 2022-01-11 PROCEDURE — 63600175 PHARM REV CODE 636 W HCPCS: Performed by: HOSPITALIST

## 2022-01-11 PROCEDURE — 63600175 PHARM REV CODE 636 W HCPCS: Performed by: EMERGENCY MEDICINE

## 2022-01-11 PROCEDURE — 99213 OFFICE O/P EST LOW 20 MIN: CPT | Mod: 25,,, | Performed by: STUDENT IN AN ORGANIZED HEALTH CARE EDUCATION/TRAINING PROGRAM

## 2022-01-11 PROCEDURE — 63600175 PHARM REV CODE 636 W HCPCS: Performed by: NURSE PRACTITIONER

## 2022-01-11 PROCEDURE — 87076 CULTURE ANAEROBE IDENT EACH: CPT | Performed by: STUDENT IN AN ORGANIZED HEALTH CARE EDUCATION/TRAINING PROGRAM

## 2022-01-11 PROCEDURE — 83735 ASSAY OF MAGNESIUM: CPT | Performed by: NURSE PRACTITIONER

## 2022-01-11 PROCEDURE — 87070 CULTURE OTHR SPECIMN AEROBIC: CPT | Performed by: STUDENT IN AN ORGANIZED HEALTH CARE EDUCATION/TRAINING PROGRAM

## 2022-01-11 RX ORDER — INSULIN ASPART 100 [IU]/ML
0-5 INJECTION, SOLUTION INTRAVENOUS; SUBCUTANEOUS
Status: DISCONTINUED | OUTPATIENT
Start: 2022-01-11 | End: 2022-01-16 | Stop reason: HOSPADM

## 2022-01-11 RX ORDER — ZINC SULFATE 50(220)MG
220 CAPSULE ORAL DAILY
COMMUNITY

## 2022-01-11 RX ORDER — TALC
6 POWDER (GRAM) TOPICAL NIGHTLY PRN
Status: DISCONTINUED | OUTPATIENT
Start: 2022-01-11 | End: 2022-01-16 | Stop reason: HOSPADM

## 2022-01-11 RX ORDER — GLUCAGON 1 MG
1 KIT INJECTION
Status: DISCONTINUED | OUTPATIENT
Start: 2022-01-11 | End: 2022-01-16 | Stop reason: HOSPADM

## 2022-01-11 RX ORDER — IBUPROFEN 200 MG
16 TABLET ORAL
Status: DISCONTINUED | OUTPATIENT
Start: 2022-01-11 | End: 2022-01-16 | Stop reason: HOSPADM

## 2022-01-11 RX ORDER — CEFEPIME HYDROCHLORIDE 1 G/50ML
1 INJECTION, SOLUTION INTRAVENOUS
Status: DISCONTINUED | OUTPATIENT
Start: 2022-01-11 | End: 2022-01-11

## 2022-01-11 RX ORDER — ASPIRIN 81 MG/1
81 TABLET ORAL DAILY
Status: DISCONTINUED | OUTPATIENT
Start: 2022-01-11 | End: 2022-01-16 | Stop reason: HOSPADM

## 2022-01-11 RX ORDER — METRONIDAZOLE 500 MG/1
500 TABLET ORAL EVERY 8 HOURS
Status: DISCONTINUED | OUTPATIENT
Start: 2022-01-11 | End: 2022-01-16 | Stop reason: HOSPADM

## 2022-01-11 RX ORDER — HYDROCODONE BITARTRATE AND ACETAMINOPHEN 7.5; 325 MG/1; MG/1
1 TABLET ORAL EVERY 8 HOURS PRN
Status: DISCONTINUED | OUTPATIENT
Start: 2022-01-11 | End: 2022-01-13

## 2022-01-11 RX ORDER — ONDANSETRON 2 MG/ML
4 INJECTION INTRAMUSCULAR; INTRAVENOUS EVERY 8 HOURS PRN
Status: DISCONTINUED | OUTPATIENT
Start: 2022-01-11 | End: 2022-01-16 | Stop reason: HOSPADM

## 2022-01-11 RX ORDER — LANOLIN ALCOHOL/MO/W.PET/CERES
400 CREAM (GRAM) TOPICAL DAILY
COMMUNITY

## 2022-01-11 RX ORDER — NALOXONE HCL 0.4 MG/ML
0.02 VIAL (ML) INJECTION
Status: DISCONTINUED | OUTPATIENT
Start: 2022-01-11 | End: 2022-01-16 | Stop reason: HOSPADM

## 2022-01-11 RX ORDER — ASCORBIC ACID 500 MG
500 TABLET ORAL DAILY
COMMUNITY

## 2022-01-11 RX ORDER — IBUPROFEN 200 MG
24 TABLET ORAL
Status: DISCONTINUED | OUTPATIENT
Start: 2022-01-11 | End: 2022-01-16 | Stop reason: HOSPADM

## 2022-01-11 RX ORDER — ACETAMINOPHEN 325 MG/1
650 TABLET ORAL EVERY 4 HOURS PRN
Status: DISCONTINUED | OUTPATIENT
Start: 2022-01-11 | End: 2022-01-16 | Stop reason: HOSPADM

## 2022-01-11 RX ORDER — ENOXAPARIN SODIUM 100 MG/ML
40 INJECTION SUBCUTANEOUS EVERY 24 HOURS
Status: DISCONTINUED | OUTPATIENT
Start: 2022-01-11 | End: 2022-01-16 | Stop reason: HOSPADM

## 2022-01-11 RX ORDER — METOPROLOL SUCCINATE 25 MG/1
25 TABLET, EXTENDED RELEASE ORAL DAILY
Status: DISCONTINUED | OUTPATIENT
Start: 2022-01-11 | End: 2022-01-16 | Stop reason: HOSPADM

## 2022-01-11 RX ORDER — CHOLECALCIFEROL (VITAMIN D3) 25 MCG
1000 TABLET ORAL DAILY
COMMUNITY

## 2022-01-11 RX ORDER — CEFEPIME HYDROCHLORIDE 1 G/50ML
1 INJECTION, SOLUTION INTRAVENOUS
Status: DISCONTINUED | OUTPATIENT
Start: 2022-01-11 | End: 2022-01-15

## 2022-01-11 RX ORDER — ATORVASTATIN CALCIUM 40 MG/1
40 TABLET, FILM COATED ORAL DAILY
Status: DISCONTINUED | OUTPATIENT
Start: 2022-01-11 | End: 2022-01-16 | Stop reason: HOSPADM

## 2022-01-11 RX ORDER — SODIUM CHLORIDE 0.9 % (FLUSH) 0.9 %
10 SYRINGE (ML) INJECTION EVERY 12 HOURS PRN
Status: DISCONTINUED | OUTPATIENT
Start: 2022-01-11 | End: 2022-01-16 | Stop reason: HOSPADM

## 2022-01-11 RX ADMIN — MORPHINE SULFATE 4 MG: 4 INJECTION INTRAVENOUS at 12:01

## 2022-01-11 RX ADMIN — METRONIDAZOLE 500 MG: 500 TABLET ORAL at 01:01

## 2022-01-11 RX ADMIN — ASPIRIN 81 MG: 81 TABLET, COATED ORAL at 08:01

## 2022-01-11 RX ADMIN — METOPROLOL SUCCINATE 25 MG: 25 TABLET, EXTENDED RELEASE ORAL at 08:01

## 2022-01-11 RX ADMIN — HYDROCODONE BITARTRATE AND ACETAMINOPHEN 1 TABLET: 7.5; 325 TABLET ORAL at 06:01

## 2022-01-11 RX ADMIN — CEFEPIME HYDROCHLORIDE 1 G: 1 INJECTION, SOLUTION INTRAVENOUS at 09:01

## 2022-01-11 RX ADMIN — ONDANSETRON 4 MG: 2 INJECTION INTRAMUSCULAR; INTRAVENOUS at 12:01

## 2022-01-11 RX ADMIN — PIPERACILLIN AND TAZOBACTAM 4.5 G: 4; .5 INJECTION, POWDER, LYOPHILIZED, FOR SOLUTION INTRAVENOUS; PARENTERAL at 03:01

## 2022-01-11 RX ADMIN — CEFEPIME HYDROCHLORIDE 1 G: 1 INJECTION, SOLUTION INTRAVENOUS at 01:01

## 2022-01-11 RX ADMIN — METRONIDAZOLE 500 MG: 500 TABLET ORAL at 09:01

## 2022-01-11 RX ADMIN — VANCOMYCIN HYDROCHLORIDE 2250 MG: 10 INJECTION, POWDER, LYOPHILIZED, FOR SOLUTION INTRAVENOUS at 12:01

## 2022-01-11 RX ADMIN — HYDROCODONE BITARTRATE AND ACETAMINOPHEN 1 TABLET: 7.5; 325 TABLET ORAL at 09:01

## 2022-01-11 RX ADMIN — ATORVASTATIN CALCIUM 40 MG: 40 TABLET, FILM COATED ORAL at 08:01

## 2022-01-11 RX ADMIN — VANCOMYCIN HYDROCHLORIDE 1250 MG: 1.25 INJECTION, POWDER, LYOPHILIZED, FOR SOLUTION INTRAVENOUS at 02:01

## 2022-01-11 NOTE — PHARMACY MED REC
"Admission Medication History     The home medication history was taken by Winsome Lucia CPhT.    Medication history obtained from, Patient's Wife Verified    You may go to "Admission" then "Reconcile Home Medications" tabs to review and/or act upon these items.      The home medication list has been updated by the Pharmacy department.    Please read ALL comments highlighted in yellow.    Please address this information as you see fit.     Feel free to contact us if you have any questions or require assistance.      The medications listed below were removed from the home medication list.  Please reorder if appropriate:  Patient reports no longer taking the following medication(s):   Ammonium Lactate 12% cream        Winsome Lucia CPhT.  Ext [phone]                .          "

## 2022-01-11 NOTE — HPI
Santiago Lucia is a 62 yo male with a pmh of CAD, NSTEMI, HTN, PVD, gout. He presented with worsening pain and swelling of right great toe x 3 days. Today he noted his toe was throbbing and his entire right foot had swollen. He has been seeing Dr Stevenson with podiatry for chronic issues with the toe including callus formations. He denies drainage to the toe. He reports history of gout with 3 attacks in the past. He denies having diabetes and does not take any meds for this. No chills or fevers, no nausea. No other complaints.     In the ED, he was noted to be tachycardic and had WBC 16. Foot XRAY with cellulitis.

## 2022-01-11 NOTE — ED NOTES
Pt c/o right great toe pain and swelling x 2 days. Swelling and skin discoloration noted. Ulcer noted to bottom of toe, no drainage present. Pt denies hx of diabetes. Reports hx of HTN and high cholesterol, takes medications as prescribed. Pt AAOx3. Respirations even and unlabored. Skin is warm and dry. NAD noted. Spouse at bedside. CB within reach.

## 2022-01-11 NOTE — ASSESSMENT & PLAN NOTE
Presents with pain and swelling to right great toe, swelling extends to foot  WBC 16, XRAY with cellulitis  Foot is warm to touch with erythema and edema noted, right great toe with extensive swelling noted, dry calluses under toe.  Given vanc and rocephin in ED  -cont vanc and switch to zosyn  -MRI pending  -consult podiatry  -NPO

## 2022-01-11 NOTE — SUBJECTIVE & OBJECTIVE
Interval History:  Denies any fevers or chills.  Some pain in his foot still.  Evaluated by Podiatry this a.m..  He is awaiting MRI.  On IV antibiotics    Review of Systems   Constitutional: Negative for fever.   Respiratory: Negative for shortness of breath.    Skin: Positive for rash and wound.   Neurological: Positive for numbness. Negative for dizziness and light-headedness.     Objective:     Vital Signs (Most Recent):  Temp: 98 °F (36.7 °C) (01/11/22 0647)  Pulse: 79 (01/11/22 0849)  Resp: 20 (01/11/22 0656)  BP: 132/78 (01/11/22 0849)  SpO2: 97 % (01/11/22 0647) Vital Signs (24h Range):  Temp:  [98 °F (36.7 °C)-98.3 °F (36.8 °C)] 98 °F (36.7 °C)  Pulse:  [] 79  Resp:  [18-20] 20  SpO2:  [97 %-99 %] 97 %  BP: (116-140)/(61-78) 132/78     Weight: 99.8 kg (220 lb)  Body mass index is 27.5 kg/m².    Intake/Output Summary (Last 24 hours) at 1/11/2022 1245  Last data filed at 1/11/2022 0737  Gross per 24 hour   Intake 100 ml   Output --   Net 100 ml      Physical Exam  Vitals and nursing note reviewed.   Constitutional:       General: He is not in acute distress.     Appearance: Normal appearance. He is not toxic-appearing.   HENT:      Head: Normocephalic and atraumatic.      Nose: Nose normal.      Mouth/Throat:      Mouth: Mucous membranes are moist.   Eyes:      Pupils: Pupils are equal, round, and reactive to light.   Cardiovascular:      Rate and Rhythm: Normal rate and regular rhythm.      Pulses: Normal pulses.      Heart sounds: Normal heart sounds.   Pulmonary:      Effort: Pulmonary effort is normal.      Breath sounds: Normal breath sounds.   Abdominal:      General: Bowel sounds are normal.      Palpations: Abdomen is soft.   Musculoskeletal:         General: Swelling and tenderness present. Normal range of motion.      Cervical back: Normal range of motion.   Skin:     General: Skin is warm and dry.      Findings: Erythema present.      Comments: See ED note for photo.   Neurological:       Mental Status: He is alert and oriented to person, place, and time.   Psychiatric:         Behavior: Behavior normal.         Thought Content: Thought content normal.         Significant Labs: All pertinent labs within the past 24 hours have been reviewed.    Significant Imaging: I have reviewed all pertinent imaging results/findings within the past 24 hours.

## 2022-01-11 NOTE — H&P
[facility] Medicine  History & Physical    Patient Name: Santiago Lucia  MRN: [id number]  Patient Class: OP- Observation  Admission Date: 1/10/2022  Attending Physician: Theo Liriano, *   Primary Care Provider: Yon Asif MD         Patient information was obtained from patient, spouse/SO, past medical records and ER records.     Subjective:     Principal Problem:Cellulitis of great toe of right foot    Chief Complaint:   Chief Complaint   Patient presents with    Toe Pain     R great toe pain and swelling. Pt has been seeing a podiatrist and wound care team for continuous callus formation followed by swelling and tissue sloughing.         HPI: Santiago Lucia is a 62 yo male with a pmh of CAD, NSTEMI, HTN, PVD, gout. He presented with worsening pain and swelling of right great toe x 3 days. Today he noted his toe was throbbing and his entire right foot had swollen. He has been seeing Dr Stevenson with podiatry for chronic issues with the toe including callus formations. He denies drainage to the toe. He reports history of gout with 3 attacks in the past. He denies having diabetes and does not take any meds for this. No chills or fevers, no nausea. No other complaints.     In the ED, he was noted to be tachycardic and had WBC 16. Foot XRAY with cellulitis.       Past Medical History:   Diagnosis Date    Avascular necrosis of bone of hip, left 10/30/2018    Coronary artery disease     DM w/o complication type II 8/9/2013    NSTEMI (non-ST elevated myocardial infarction) 01/2013       Past Surgical History:   Procedure Laterality Date    CARDIAC CATHETERIZATION      CORONARY ANGIOPLASTY WITH STENT PLACEMENT  01/2013    [facility] and LAD    LUMBAR LAMINECTOMY  2011    TOTAL HIP ARTHROPLASTY Right 12/2011            Review of patient's allergies indicates:  No Known Allergies    No current facility-administered medications on file prior to encounter.     Current Outpatient  Medications on File Prior to Encounter   Medication Sig    ammonium lactate 12 % Crea Apply 1 g topically 2 (two) times a day.    aspirin (ECOTRIN) 81 MG EC tablet Take 1 tablet (81 mg total) by mouth once daily.    diclofenac (VOLTAREN) 75 MG EC tablet Take 1 tablet (75 mg total) by mouth 2 (two) times daily.    HYDROcodone-acetaminophen (NORCO) 7.5-325 mg per tablet Take 1 tablet by mouth every 12 (twelve) hours as needed for Pain.    metoprolol succinate (TOPROL-XL) 25 MG 24 hr tablet TAKE 1 TABLET(25 MG) BY MOUTH EVERY DAY    rosuvastatin (CRESTOR) 10 MG tablet TAKE 1 TABLET(10 MG) BY MOUTH EVERY DAY    rosuvastatin (CRESTOR) 20 MG tablet Take 20 mg by mouth every evening.    tadalafiL (CIALIS) 20 MG Tab Take 1 tablet (20 mg total) by mouth as needed.     Family History    None       Tobacco Use    Smoking status: Former Smoker     Packs/day: 0.50     Years: 40.00     Pack years: 20.00     Types: Cigarettes     Quit date: 10/8/2019     Years since quittin.2    Smokeless tobacco: Never Used   Substance and Sexual Activity    Alcohol use: Yes     Comment: social    Drug use: No    Sexual activity: Not on file     Review of Systems   Constitutional: Negative for chills and fever.   HENT: Negative for congestion.    Eyes: Negative for visual disturbance.   Respiratory: Negative for cough and shortness of breath.    Cardiovascular: Negative for chest pain.   Gastrointestinal: Negative for abdominal pain and nausea.   Genitourinary: Negative for difficulty urinating.   Musculoskeletal: Positive for arthralgias.   Skin: Positive for wound.   Neurological: Negative for weakness and headaches.   Psychiatric/Behavioral: Negative for agitation.     Objective:     Vital Signs (Most Recent):  Temp: 98 °F (36.7 °C) (22)  Pulse: 84 (22)  Resp: 18 (22)  BP: 135/76 (22)  SpO2: 99 % (22) Vital Signs (24h Range):  Temp:  [98 °F (36.7 °C)-98.3 °F (36.8 °C)] 98  °F (36.7 °C)  Pulse:  [] 84  Resp:  [18-20] 18  SpO2:  [99 %] 99 %  BP: (135-140)/(70-76) 135/76     Weight: 99.8 kg (220 lb)  Body mass index is 27.5 kg/m².    Physical Exam  Vitals and nursing note reviewed.   Constitutional:       General: He is not in acute distress.     Appearance: Normal appearance. He is not toxic-appearing.   HENT:      Head: Normocephalic and atraumatic.      Nose: Nose normal.      Mouth/Throat:      Mouth: Mucous membranes are moist.   Eyes:      Pupils: Pupils are equal, round, and reactive to light.   Cardiovascular:      Rate and Rhythm: Normal rate and regular rhythm.      Pulses: Normal pulses.      Heart sounds: Normal heart sounds.   Pulmonary:      Effort: Pulmonary effort is normal.      Breath sounds: Normal breath sounds.   Abdominal:      General: Bowel sounds are normal.      Palpations: Abdomen is soft.   Musculoskeletal:         General: Swelling and tenderness present. Normal range of motion.      Cervical back: Normal range of motion.   Skin:     General: Skin is warm and dry.      Findings: Erythema present.   Neurological:      Mental Status: He is alert and oriented to person, place, and time.   Psychiatric:         Behavior: Behavior normal.         Thought Content: Thought content normal.           CRANIAL NERVES     CN III, IV, VI   Pupils are equal, round, and reactive to light.       Significant Labs: All pertinent labs within the past 24 hours have been reviewed.    Significant Imaging: I have reviewed all pertinent imaging results/findings within the past 24 hours.    Assessment/Plan:     * Cellulitis of great toe of right foot  Presents with pain and swelling to right great toe, swelling extends to foot  WBC 16, XRAY with cellulitis  Foot is warm to touch with erythema and edema noted, right great toe with extensive swelling noted, dry calluses under toe.  Given vanc and rocephin in ED  -cont vanc and switch to zosyn  -MRI pending  -consult  podiatry  -NPO      Type 2 diabetes mellitus with diabetic peripheral angiopathy with gangrene  Diet controlled  A1C pending  accuchecks and SSI      PVD (peripheral vascular disease)  Cont statin      CAD (coronary artery disease)  Cont ASA, statin      Dyslipidemia  Cont statin      HTN (hypertension)    Cont metoprolol      VTE Risk Mitigation (From admission, onward)         Ordered     enoxaparin injection 40 mg  Daily         01/11/22 0001     IP VTE HIGH RISK PATIENT  Once         01/11/22 0001     Place sequential compression device  Until discontinued         01/11/22 0001                   Alicia England NP  Department of Hospital Medicine   [facility] - Emergency Dept

## 2022-01-11 NOTE — SUBJECTIVE & OBJECTIVE
Past Medical History:   Diagnosis Date    Avascular necrosis of bone of hip, left 10/30/2018    Coronary artery disease     DM w/o complication type II 2013    NSTEMI (non-ST elevated myocardial infarction) 2013       Past Surgical History:   Procedure Laterality Date    CARDIAC CATHETERIZATION      CORONARY ANGIOPLASTY WITH STENT PLACEMENT  2013    [facility] RCA and LAD    LUMBAR LAMINECTOMY      TOTAL HIP ARTHROPLASTY Right 2011            Review of patient's allergies indicates:  No Known Allergies    No current facility-administered medications on file prior to encounter.     Current Outpatient Medications on File Prior to Encounter   Medication Sig    ammonium lactate 12 % Crea Apply 1 g topically 2 (two) times a day.    aspirin (ECOTRIN) 81 MG EC tablet Take 1 tablet (81 mg total) by mouth once daily.    diclofenac (VOLTAREN) 75 MG EC tablet Take 1 tablet (75 mg total) by mouth 2 (two) times daily.    HYDROcodone-acetaminophen (NORCO) 7.5-325 mg per tablet Take 1 tablet by mouth every 12 (twelve) hours as needed for Pain.    metoprolol succinate (TOPROL-XL) 25 MG 24 hr tablet TAKE 1 TABLET(25 MG) BY MOUTH EVERY DAY    rosuvastatin (CRESTOR) 10 MG tablet TAKE 1 TABLET(10 MG) BY MOUTH EVERY DAY    rosuvastatin (CRESTOR) 20 MG tablet Take 20 mg by mouth every evening.    tadalafiL (CIALIS) 20 MG Tab Take 1 tablet (20 mg total) by mouth as needed.     Family History    None       Tobacco Use    Smoking status: Former Smoker     Packs/day: 0.50     Years: 40.00     Pack years: 20.00     Types: Cigarettes     Quit date: 10/8/2019     Years since quittin.2    Smokeless tobacco: Never Used   Substance and Sexual Activity    Alcohol use: Yes     Comment: social    Drug use: No    Sexual activity: Not on file     Review of Systems   Constitutional: Negative for chills and fever.   HENT: Negative for congestion.    Eyes: Negative for visual disturbance.   Respiratory: Negative for  cough and shortness of breath.    Cardiovascular: Negative for chest pain.   Gastrointestinal: Negative for abdominal pain and nausea.   Genitourinary: Negative for difficulty urinating.   Musculoskeletal: Positive for arthralgias.   Skin: Positive for wound.   Neurological: Negative for weakness and headaches.   Psychiatric/Behavioral: Negative for agitation.     Objective:     Vital Signs (Most Recent):  Temp: 98 °F (36.7 °C) (01/11/22 0047)  Pulse: 84 (01/11/22 0047)  Resp: 18 (01/11/22 0051)  BP: 135/76 (01/11/22 0047)  SpO2: 99 % (01/11/22 0047) Vital Signs (24h Range):  Temp:  [98 °F (36.7 °C)-98.3 °F (36.8 °C)] 98 °F (36.7 °C)  Pulse:  [] 84  Resp:  [18-20] 18  SpO2:  [99 %] 99 %  BP: (135-140)/(70-76) 135/76     Weight: 99.8 kg (220 lb)  Body mass index is 27.5 kg/m².    Physical Exam  Vitals and nursing note reviewed.   Constitutional:       General: He is not in acute distress.     Appearance: Normal appearance. He is not toxic-appearing.   HENT:      Head: Normocephalic and atraumatic.      Nose: Nose normal.      Mouth/Throat:      Mouth: Mucous membranes are moist.   Eyes:      Pupils: Pupils are equal, round, and reactive to light.   Cardiovascular:      Rate and Rhythm: Normal rate and regular rhythm.      Pulses: Normal pulses.      Heart sounds: Normal heart sounds.   Pulmonary:      Effort: Pulmonary effort is normal.      Breath sounds: Normal breath sounds.   Abdominal:      General: Bowel sounds are normal.      Palpations: Abdomen is soft.   Musculoskeletal:         General: Swelling and tenderness present. Normal range of motion.      Cervical back: Normal range of motion.   Skin:     General: Skin is warm and dry.      Findings: Erythema present.   Neurological:      Mental Status: He is alert and oriented to person, place, and time.   Psychiatric:         Behavior: Behavior normal.         Thought Content: Thought content normal.           CRANIAL NERVES     CN III, IV, VI   Pupils are  equal, round, and reactive to light.       Significant Labs: All pertinent labs within the past 24 hours have been reviewed.    Significant Imaging: I have reviewed all pertinent imaging results/findings within the past 24 hours.

## 2022-01-11 NOTE — PROGRESS NOTES
Pharmacokinetic Initial Assessment: IV Vancomycin    Assessment/Plan:    Initiate intravenous vancomycin with loading dose of 2250 mg once followed by a maintenance dose of vancomycin 1250mg IV every 12 hours  Desired empiric serum trough concentration is 10 to 15 mcg/mL  Draw vancomycin trough level 60 min prior to fourth dose on 1/12/22 at approximately 1130  Pharmacy will continue to follow and monitor vancomycin.      Please contact pharmacy at extension [phone] with any questions regarding this assessment.     Thank you for the consult,   Man Grullon       Patient brief summary:  Santiago Lucia is a 61 y.o. male initiated on antimicrobial therapy with IV Vancomycin for treatment of suspected skin & soft tissue infection    Drug Allergies:   Review of patient's allergies indicates:  No Known Allergies    Actual Body Weight:   99kg    Renal Function:   Estimated Creatinine Clearance: 84.3 mL/min (based on SCr of 1.1 mg/dL).,     Dialysis Method (if applicable):  N/A    CBC (last 72 hours):  Recent Labs   Lab Result Units 01/10/22  2114   WBC K/uL 16.89*   Hemoglobin g/dL 14.2   Hematocrit % 43.6   Platelets K/uL 198   Gran % % 73.3*   Lymph % % 15.0*   Mono % % 9.4   Eosinophil % % 1.5   Basophil % % 0.4   Differential Method  Automated       Metabolic Panel (last 72 hours):  Recent Labs   Lab Result Units 01/10/22  2114   Sodium mmol/L 141   Potassium mmol/L 4.1   Chloride mmol/L 103   CO2 mmol/L 25   Glucose mg/dL 106   BUN mg/dL 19   Creatinine mg/dL 1.1   Albumin g/dL 4.1   Total Bilirubin mg/dL 0.4   Alkaline Phosphatase U/L 82   AST U/L 22   ALT U/L 36       Drug levels (last 3 results):  No results for input(s): VANCOMYCINRA, VANCOMYCINPE, VANCOMYCINTR in the last 72 hours.    Microbiologic Results:  Microbiology Results (last 7 days)       Procedure Component Value Units Date/Time    Blood culture #1 **CANNOT BE ORDERED STAT** [[id number]] Collected: 01/11/22 0005    Order Status: Sent Specimen: Blood  Updated: 01/11/22 0006    Blood culture #2 **CANNOT BE ORDERED STAT** [[id number]] Collected: 01/11/22 0005    Order Status: Sent Specimen: Blood Updated: 01/11/22 0006

## 2022-01-11 NOTE — PROGRESS NOTES
Pharmacist Renal Dose Adjustment Note    Santiago Lucia is a 61 y.o. male being treated with the medication  cefepime    Patient Data:    Vital Signs (Most Recent):  Temp: 98.3 °F (36.8 °C) (01/11/22 1252)  Pulse: 68 (01/11/22 1252)  Resp: 20 (01/11/22 1252)  BP: 98/63 (01/11/22 1252)  SpO2: 100 % (01/11/22 1252) Vital Signs (72h Range):  Temp:  [98 °F (36.7 °C)-98.3 °F (36.8 °C)]   Pulse:  []   Resp:  [18-20]   BP: ()/(61-78)   SpO2:  [97 %-100 %]      Recent Labs   Lab 01/10/22  2114 01/11/22  0309   CREATININE 1.1 0.9     Serum creatinine: 0.9 mg/dL 01/11/22 0309  Estimated creatinine clearance: 103 mL/min    Medication cefepime dose: 1 gm frequency q12h will be changed to medication cefepime dose 1 gm frequency q8h    Pharmacist's Name: Tri Perkins  Pharmacist's Extension: [phone]

## 2022-01-11 NOTE — FIRST PROVIDER EVALUATION
Emergency Department TeleTriage Encounter Note      CHIEF COMPLAINT    Chief Complaint   Patient presents with    Toe Pain     R great toe pain and swelling. Pt has been seeing a podiatrist and wound care team for continuous callus formation followed by swelling and tissue sloughing.        VITAL SIGNS   Initial Vitals [01/10/22 1934]   BP Pulse Resp Temp SpO2   (!) 140/70 (!) 113 20 98.3 °F (36.8 °C) 99 %      MAP       --            ALLERGIES    Review of patient's allergies indicates:  No Known Allergies    PROVIDER TRIAGE NOTE  TeleTriage Note: Santiago Lucia, a nontoxic/well appearing, 61 y.o. male, presented to the ED with c/o right great toe pain and swelling that began 3 days ago. Denies injury or trauma to the toe. Hx of skin ulcer to the toe.     All ED beds are full at present; patient notified of this status.  Patient seen and medically screened by Nurse Practitioner via teletriage. Orders initiated at triage to expedite care.  Patient is stable to return to the waiting room and will be placed in an ED bed when available.  Care will be transferred to an alternate provider when patient has been placed in an Exam Room from the [facility] for physical exam, additional orders, and disposition.  8:00 PM Fidelia Artis, DNP, FNP-C        ORDERS  Labs Reviewed   CBC W/ AUTO DIFFERENTIAL   COMPREHENSIVE METABOLIC PANEL       ED Orders (720h ago, onward)    Start Ordered     Status Ordering Provider    01/10/22 2002 01/10/22 2002  Insert peripheral IV  Continuous         Ordered FIDELIA ARTIS    01/10/22 2002 01/10/22 2002  CBC auto differential  STAT         Ordered FIDELIA ARTIS    01/10/22 2002 01/10/22 2002  Comprehensive metabolic panel  STAT         Ordered FIDELIA ARTIS            Virtual Visit Note: The provider triage portion of this emergency department evaluation and documentation was performed via [employer], a HIPAA-compliant telemedicine application, in concert with a tele-presenter  in the room. A face to face patient evaluation with one of my colleagues will occur once the patient is placed in an emergency department room.      DISCLAIMER: This note was prepared with [employer] voice recognition transcription software. Garbled syntax, mangled pronouns, and other bizarre constructions may be attributed to that software system.

## 2022-01-11 NOTE — PROGRESS NOTES
Pharmacokinetic Initial Assessment: IV Vancomycin    Assessment/Plan:    Initiate intravenous vancomycin with loading dose of 2250 mg once

## 2022-01-11 NOTE — ASSESSMENT & PLAN NOTE
Presents with pain and swelling to right great toe, swelling extends to foot  WBC 16, XRAY with cellulitis  Foot is warm to touch with erythema and edema noted, right great toe with extensive swelling noted, dry calluses under toe.  Given vanc and rocephin in ED  -cont vanc and switch to cefepime/Flagyl  -MRI pending  -consult podiatry

## 2022-01-11 NOTE — ED PROVIDER NOTES
Encounter Date: 1/10/2022       History     Chief Complaint   Patient presents with    Toe Pain     R great toe pain and swelling. Pt has been seeing a podiatrist and wound care team for continuous callus formation followed by swelling and tissue sloughing.      This is a 61-year-old male History of hypertension, hyperglycemia, coronary disease persistent ulceration right hallux, presents the ER for evaluation right toe pain.  This appears to be a chronic issue.  Patient sources that he started experiencing worsening right toe pain and swelling that began 3 days ago.  Reports some warmth around the toe as well with moderate pain.  No fever chills chest pain shortness of.  Has been following up with Podiatry.  Came to the ER for further evaluation.            Review of patient's allergies indicates:  No Known Allergies  Past Medical History:   Diagnosis Date    Avascular necrosis of bone of hip, left 10/30/2018    Coronary artery disease     DM w/o complication type II 2013    NSTEMI (non-ST elevated myocardial infarction) 2013     Past Surgical History:   Procedure Laterality Date    CARDIAC CATHETERIZATION      CORONARY ANGIOPLASTY WITH STENT PLACEMENT  2013    [facility]ACK[facility] RCA and LAD    LUMBAR LAMINECTOMY      TOTAL HIP ARTHROPLASTY Right 2011          No family history on file.  Social History     Tobacco Use    Smoking status: Former Smoker     Packs/day: 0.50     Years: 40.00     Pack years: 20.00     Types: Cigarettes     Quit date: 10/8/2019     Years since quittin.2    Smokeless tobacco: Never Used   Substance Use Topics    Alcohol use: Yes     Comment: social    Drug use: No     Review of Systems   Constitutional: Negative for fatigue and fever.   Respiratory: Negative for shortness of breath.    Cardiovascular: Negative for chest pain.   Gastrointestinal: Negative for abdominal pain.   Musculoskeletal: Positive for arthralgias and joint swelling.   Skin: Positive for rash and  wound.   All other systems reviewed and are negative.      Physical Exam     Initial Vitals [01/10/22 1934]   BP Pulse Resp Temp SpO2   (!) 140/70 (!) 113 20 98.3 °F (36.8 °C) 99 %      MAP       --         Physical Exam    Nursing note and vitals reviewed.  Constitutional: He appears well-developed and well-nourished.   HENT:   Head: Normocephalic and atraumatic.   Eyes: Pupils are equal, round, and reactive to light.   Neck:   Normal range of motion.  Pulmonary/Chest: No respiratory distress.   Abdominal: Abdomen is soft.   Musculoskeletal:         General: Normal range of motion.      Cervical back: Normal range of motion.     Neurological: He is alert and oriented to person, place, and time.   Skin: Skin is warm and dry.   Psychiatric: He has a normal mood and affect.              Painful erythematous ulcerated 1st digit right toe, there is some warmth and possible early cellulitic changes, exquisitely tender, diminished pulses noted    ED Course   Procedures  Labs Reviewed   CBC W/ AUTO DIFFERENTIAL - Abnormal; Notable for the following components:       Result Value    WBC 16.89 (*)     Gran # (ANC) 12.4 (*)     Immature Grans (Abs) 0.07 (*)     Mono # 1.6 (*)     Gran % 73.3 (*)     Lymph % 15.0 (*)     All other components within normal limits   URIC ACID - Abnormal; Notable for the following components:    Uric Acid 7.4 (*)     All other components within normal limits   C-REACTIVE PROTEIN - Abnormal; Notable for the following components:    CRP 64.1 (*)     All other components within normal limits   SEDIMENTATION RATE - Abnormal; Notable for the following components:    Sed Rate 34 (*)     All other components within normal limits   BASIC METABOLIC PANEL - Abnormal; Notable for the following components:    Glucose 125 (*)     All other components within normal limits   CBC W/ AUTO DIFFERENTIAL - Abnormal; Notable for the following components:    WBC 13.66 (*)     RBC 4.37 (*)     Hemoglobin 12.5 (*)      Hematocrit 38.8 (*)     Immature Granulocytes 0.7 (*)     Gran # (ANC) 9.2 (*)     Immature Grans (Abs) 0.09 (*)     Mono # 1.3 (*)     All other components within normal limits   HEMOGLOBIN A1C - Abnormal; Notable for the following components:    Hemoglobin A1C 6.0 (*)     All other components within normal limits   CULTURE, BLOOD   CULTURE, BLOOD   CULTURE, ANAEROBIC   CULTURE, AEROBIC  (SPECIFY SOURCE)   COMPREHENSIVE METABOLIC PANEL   C-REACTIVE PROTEIN   SEDIMENTATION RATE   URIC ACID   MAGNESIUM   PROTIME-INR   SARS-COV-2 RDRP GENE    Narrative:     This test utilizes isothermal nucleic acid amplification   technology to detect the SARS-CoV-2 RdRp nucleic acid segment.   The analytical sensitivity (limit of detection) is 125 genome   equivalents/mL.   A POSITIVE result implies infection with the SARS-CoV-2 virus;   the patient is presumed to be contagious.     A NEGATIVE result means that SARS-CoV-2 nucleic acids are not   present above the limit of detection. A NEGATIVE result should be   treated as presumptive. It does not rule out the possibility of   COVID-19 and should not be the sole basis for treatment decisions.   If COVID-19 is strongly suspected based on clinical and exposure   history, re-testing using an alternate molecular assay should be   considered.   This test is only for use under the Food and Drug   Administration s Emergency Use Authorization (EUA).   Commercial kits are provided by [employer].   Performance characteristics of the EUA have been independently   verified by [facility] Department of   Pathology and Laboratory Medicine.   _________________________________________________________________   The authorized Fact Sheet for Healthcare Providers and the authorized Fact   Sheet for Patients of the ID NOW COVID-19 are available on the FDA   website:     https://www.fda.gov/media/177054/download  https://www.fda.gov/media/347333/download         POCT GLUCOSE,  HAND-HELD DEVICE   POCT GLUCOSE, HAND-HELD DEVICE   POCT GLUCOSE, HAND-HELD DEVICE   POCT GLUCOSE, HAND-HELD DEVICE   POCT GLUCOSE          Imaging Results          MRI Foot (Forefoot) Right Without Contrast (Final result)  Result time 01/11/22 18:07:07    Final result by Wally Shook MD (01/11/22 18:07:07)                 Impression:      1. Nonspecific edema-like signal involving the great toe the distal phalanx.  Given the adjacent soft tissue swelling and suggested soft tissue defect, this could relate to acute osteomyelitis.  Nonspecific reactive marrow edema could appear similar.  Recommend clinical correlation with consideration of biopsy for more definitive characterization.  2. No acute appearing marrow changes noted elsewhere.  Additional details, as provided in the body of report.      Electronically signed by: Wally Shook  Date:    01/11/2022  Time:    18:07             Narrative:    EXAMINATION:  MRI FOOT (FOREFOOT) RIGHT WITHOUT CONTRAST    CLINICAL HISTORY:  Foot swelling, diabetic, osteomyelitis suspected, xray done;    TECHNIQUE:  Multiplanar, multisequence MR imaging of the foot centered at the forefoot was performed without contrast.    COMPARISON:  Radiograph of the right great toe performed 01/10/2022.    FINDINGS:  Correlating with findings on prior radiograph performed 01/10/2022, 22:15 hours, there is diffuse soft tissue swelling about the great toe, with ill-defined edema-like signal, most pronounced at the medial aspect of the distal aspect of the great toe about the distal phalanx.  Suggested soft tissue defect at the dorsal aspect of the great toe mass centered at the level of the interphalangeal joint and distal phalanx.    Additionally, there is ill-defined edema-like signal involving the great toe distal phalanx (sagittal stir series 9, image 8; short axis STIR series 5, image 37).  No definite well corticated erosive changes suggested.  Small effusion is suggested about the  great toe metatarsophalangeal joint    No definite acute marrow findings are suggested elsewhere.  No evidence of acute fracture or dislocation is seen.  Degenerative changes are again noted at the great toe interphalangeal joint.    Diffuse degree of soft tissue swelling is noted elsewhere.                               X-Ray Toe 2 or More Views Right (Final result)  Result time 01/10/22 22:43:59   Procedure changed from X-Ray Toe 2 or More Views Left     Final result by Db Pena MD (01/10/22 22:43:59)                 Impression:      No cortical erosions to suggest advanced osteomyelitis.  Additional evaluation, as clinically warranted.    Diffuse soft tissues of the right great toe, suggestive of overlying cellulitis.      Electronically signed by: Db Pena MD  Date:    01/10/2022  Time:    22:43             Narrative:    EXAMINATION:  XR TOE 2 OR MORE VIEWS RIGHT    CLINICAL HISTORY:  r/o osteo 1st digit;    TECHNIQUE:  Two views of the right great toe were performed.    COMPARISON:  07/30/2021.    FINDINGS:  The bone mineralization is within normal limits.  There is no cortical step-off.  There is no evidence of periostitis.    There is expected joint space narrowing.  There is marked soft tissue swelling involving the right great toe.  No radiopaque foreign body is identified.                                 Medications   sodium chloride 0.9% flush 10 mL (has no administration in time range)   glucose chewable tablet 16 g (has no administration in time range)   glucose chewable tablet 24 g (has no administration in time range)   dextrose 50% injection 12.5 g (has no administration in time range)   dextrose 50% injection 25 g (has no administration in time range)   glucagon (human recombinant) injection 1 mg (has no administration in time range)   naloxone 0.4 mg/mL injection 0.02 mg (has no administration in time range)   enoxaparin injection 40 mg (40 mg Subcutaneous Not Given 1/11/22 1700)    acetaminophen tablet 650 mg (has no administration in time range)   ondansetron injection 4 mg (4 mg Intravenous Given 1/11/22 0051)   insulin aspart U-100 pen 0-5 Units (has no administration in time range)   melatonin tablet 6 mg (has no administration in time range)   vancomycin - pharmacy to dose (has no administration in time range)   vancomycin 1.25 g in dextrose 5% 250 mL IVPB (ready to mix) (1,250 mg Intravenous Trough Due As Scheduled Before Dose 1/12/22 1130)   aspirin EC tablet 81 mg (81 mg Oral Given 1/11/22 0849)   HYDROcodone-acetaminophen 7.5-325 mg per tablet 1 tablet (1 tablet Oral Given 1/11/22 0656)   metoprolol succinate (TOPROL-XL) 24 hr tablet 25 mg (25 mg Oral Given 1/11/22 0849)   atorvastatin tablet 40 mg (40 mg Oral Given 1/11/22 0849)   metroNIDAZOLE tablet 500 mg (500 mg Oral Given 1/11/22 1358)   cefepime in dextrose 5 % 1 gram/50 mL IVPB 1 g (0 g Intravenous Stopped 1/11/22 1428)   morphine injection 4 mg (4 mg Intravenous Given 1/11/22 0051)   vancomycin (VANCOCIN) 2,250 mg in dextrose 5 % 500 mL IVPB (0 mg Intravenous Stopped 1/11/22 0301)     Medical Decision Making:   Initial Assessment:   61-year-old male presents the ER for evaluation of right toe pain and swelling.  Appears to be a chronic condition.  But acutely changed.  Is warm, painful to touch.  With some possible cellulitic changes.  Concern for osteomyelitis, cellulitis, gout, other cause.  Will blood work support reassess low threshold for admission.             ED Course as of 01/11/22 2108   Mon Bob 10, 2022   2305 Resting in bed, no acute distress.  X-ray negative for any cortical breakdown.  Patient has leukocytosis elevated inflammatory markers, uric slightly elevated, concern for cellulitis, versus early osteo, versus possible gout.  However concern for tachycardia and elevated inflammatory markers.  He will plan admission for antibiotics, and will consult Podiatry in the morning.  Patient will be admitted to  [facility]. [SE]      ED Course User Index  [SE] Lyla Vela MD             Clinical Impression:   Final diagnoses:  [R00.0] Tachycardia  [L03.031] Cellulitis of right toe (Primary)          ED Disposition Condition    Observation               Lyla Vela MD  01/10/22 2202       Lyla Vela MD  01/11/22 2108

## 2022-01-11 NOTE — PROGRESS NOTES
[facility]  [facility] Medicine  Progress Note    Patient Name: Santiago Lucia  MRN: [id number]  Patient Class: OP- Observation   Admission Date: 1/10/2022  Length of Stay: 0 days  Attending Physician: Theo Liriano, *  Primary Care Provider: Yon Asif MD        Subjective:     Principal Problem:Cellulitis of great toe of right foot        HPI:  Santiago Lucia is a 60 yo male with a pmh of CAD, NSTEMI, HTN, PVD, gout. He presented with worsening pain and swelling of right great toe x 3 days. Today he noted his toe was throbbing and his entire right foot had swollen. He has been seeing Dr Stevenson with podiatry for chronic issues with the toe including callus formations. He denies drainage to the toe. He reports history of gout with 3 attacks in the past. He denies having diabetes and does not take any meds for this. No chills or fevers, no nausea. No other complaints.     In the ED, he was noted to be tachycardic and had WBC 16. Foot XRAY with cellulitis.       Overview/Hospital Course:  No notes on file    Interval History:  Denies any fevers or chills.  Some pain in his foot still.  Evaluated by Podiatry this a.m..  He is awaiting MRI.  On IV antibiotics    Review of Systems   Constitutional: Negative for fever.   Respiratory: Negative for shortness of breath.    Skin: Positive for rash and wound.   Neurological: Positive for numbness. Negative for dizziness and light-headedness.     Objective:     Vital Signs (Most Recent):  Temp: 98 °F (36.7 °C) (01/11/22 0647)  Pulse: 79 (01/11/22 0849)  Resp: 20 (01/11/22 0656)  BP: 132/78 (01/11/22 0849)  SpO2: 97 % (01/11/22 0647) Vital Signs (24h Range):  Temp:  [98 °F (36.7 °C)-98.3 °F (36.8 °C)] 98 °F (36.7 °C)  Pulse:  [] 79  Resp:  [18-20] 20  SpO2:  [97 %-99 %] 97 %  BP: (116-140)/(61-78) 132/78     Weight: 99.8 kg (220 lb)  Body mass index is 27.5 kg/m².    Intake/Output Summary (Last 24 hours) at 1/11/2022 2629  Last data filed at  1/11/2022 0737  Gross per 24 hour   Intake 100 ml   Output --   Net 100 ml      Physical Exam  Vitals and nursing note reviewed.   Constitutional:       General: He is not in acute distress.     Appearance: Normal appearance. He is not toxic-appearing.   HENT:      Head: Normocephalic and atraumatic.      Nose: Nose normal.      Mouth/Throat:      Mouth: Mucous membranes are moist.   Eyes:      Pupils: Pupils are equal, round, and reactive to light.   Cardiovascular:      Rate and Rhythm: Normal rate and regular rhythm.      Pulses: Normal pulses.      Heart sounds: Normal heart sounds.   Pulmonary:      Effort: Pulmonary effort is normal.      Breath sounds: Normal breath sounds.   Abdominal:      General: Bowel sounds are normal.      Palpations: Abdomen is soft.   Musculoskeletal:         General: Swelling and tenderness present. Normal range of motion.      Cervical back: Normal range of motion.   Skin:     General: Skin is warm and dry.      Findings: Erythema present.      Comments: See ED note for photo.   Neurological:      Mental Status: He is alert and oriented to person, place, and time.   Psychiatric:         Behavior: Behavior normal.         Thought Content: Thought content normal.         Significant Labs: All pertinent labs within the past 24 hours have been reviewed.    Significant Imaging: I have reviewed all pertinent imaging results/findings within the past 24 hours.      Assessment/Plan:      * Cellulitis of great toe of right foot  Presents with pain and swelling to right great toe, swelling extends to foot  WBC 16, XRAY with cellulitis  Foot is warm to touch with erythema and edema noted, right great toe with extensive swelling noted, dry calluses under toe.  Given vanc and rocephin in ED  -cont vanc and switch to cefepime/Flagyl  -MRI pending  -consult podiatry    Type 2 diabetes mellitus with diabetic peripheral angiopathy without gangrene  Diet controlled  A1C 6.0  accuchecks and  SSI      PVD (peripheral vascular disease)  Cont statin      CAD (coronary artery disease)  Cont ASA, statin      Dyslipidemia  Cont statin      HTN (hypertension)  Cont metoprolol      VTE Risk Mitigation (From admission, onward)         Ordered     enoxaparin injection 40 mg  Daily         01/11/22 0001     IP VTE HIGH RISK PATIENT  Once         01/11/22 0001     Place sequential compression device  Until discontinued         01/11/22 0001                Discharge Planning   KIMBERLY: 1/13/2022     Code Status: Full Code   Is the patient medically ready for discharge?:     Reason for patient still in hospital (select all that apply): Patient trending condition and Consult recommendations                     Theo Liriano MD  Department of Hospital Medicine   [facility] - Emergency Dept

## 2022-01-12 LAB
ANION GAP SERPL CALC-SCNC: 11 MMOL/L (ref 8–16)
BASOPHILS # BLD AUTO: 0.1 K/UL (ref 0–0.2)
BASOPHILS NFR BLD: 0.9 % (ref 0–1.9)
BUN SERPL-MCNC: 21 MG/DL (ref 8–23)
CALCIUM SERPL-MCNC: 9.4 MG/DL (ref 8.7–10.5)
CHLORIDE SERPL-SCNC: 104 MMOL/L (ref 95–110)
CO2 SERPL-SCNC: 21 MMOL/L (ref 23–29)
CREAT SERPL-MCNC: 0.9 MG/DL (ref 0.5–1.4)
DIFFERENTIAL METHOD: ABNORMAL
EOSINOPHIL # BLD AUTO: 0.3 K/UL (ref 0–0.5)
EOSINOPHIL NFR BLD: 2.3 % (ref 0–8)
ERYTHROCYTE [DISTWIDTH] IN BLOOD BY AUTOMATED COUNT: 14.5 % (ref 11.5–14.5)
EST. GFR  (AFRICAN AMERICAN): >60 ML/MIN/1.73 M^2
EST. GFR  (NON AFRICAN AMERICAN): >60 ML/MIN/1.73 M^2
GLUCOSE SERPL-MCNC: 103 MG/DL (ref 70–110)
HCT VFR BLD AUTO: 39.7 % (ref 40–54)
HGB BLD-MCNC: 12.5 G/DL (ref 14–18)
IMM GRANULOCYTES # BLD AUTO: 0.07 K/UL (ref 0–0.04)
IMM GRANULOCYTES NFR BLD AUTO: 0.6 % (ref 0–0.5)
LYMPHOCYTES # BLD AUTO: 2.6 K/UL (ref 1–4.8)
LYMPHOCYTES NFR BLD: 22.5 % (ref 18–48)
MAGNESIUM SERPL-MCNC: 2.1 MG/DL (ref 1.6–2.6)
MCH RBC QN AUTO: 28.1 PG (ref 27–31)
MCHC RBC AUTO-ENTMCNC: 31.5 G/DL (ref 32–36)
MCV RBC AUTO: 89 FL (ref 82–98)
MONOCYTES # BLD AUTO: 1.2 K/UL (ref 0.3–1)
MONOCYTES NFR BLD: 10.3 % (ref 4–15)
NEUTROPHILS # BLD AUTO: 7.2 K/UL (ref 1.8–7.7)
NEUTROPHILS NFR BLD: 63.4 % (ref 38–73)
NRBC BLD-RTO: 0 /100 WBC
PLATELET # BLD AUTO: 203 K/UL (ref 150–450)
PMV BLD AUTO: 10 FL (ref 9.2–12.9)
POCT GLUCOSE: 107 MG/DL (ref 70–110)
POCT GLUCOSE: 108 MG/DL (ref 70–110)
POCT GLUCOSE: 130 MG/DL (ref 70–110)
POCT GLUCOSE: 223 MG/DL (ref 70–110)
POTASSIUM SERPL-SCNC: 4.3 MMOL/L (ref 3.5–5.1)
RBC # BLD AUTO: 4.45 M/UL (ref 4.6–6.2)
SODIUM SERPL-SCNC: 136 MMOL/L (ref 136–145)
VANCOMYCIN TROUGH SERPL-MCNC: 18.9 UG/ML (ref 10–22)
WBC # BLD AUTO: 11.38 K/UL (ref 3.9–12.7)

## 2022-01-12 PROCEDURE — 63600175 PHARM REV CODE 636 W HCPCS: Performed by: HOSPITALIST

## 2022-01-12 PROCEDURE — 36415 COLL VENOUS BLD VENIPUNCTURE: CPT | Performed by: NURSE PRACTITIONER

## 2022-01-12 PROCEDURE — 21400001 HC TELEMETRY ROOM

## 2022-01-12 PROCEDURE — 25000003 PHARM REV CODE 250: Performed by: HOSPITALIST

## 2022-01-12 PROCEDURE — 63600175 PHARM REV CODE 636 W HCPCS: Performed by: NURSE PRACTITIONER

## 2022-01-12 PROCEDURE — 36415 COLL VENOUS BLD VENIPUNCTURE: CPT | Performed by: HOSPITALIST

## 2022-01-12 PROCEDURE — 83735 ASSAY OF MAGNESIUM: CPT | Performed by: NURSE PRACTITIONER

## 2022-01-12 PROCEDURE — 80202 ASSAY OF VANCOMYCIN: CPT | Performed by: HOSPITALIST

## 2022-01-12 PROCEDURE — 25000003 PHARM REV CODE 250: Performed by: STUDENT IN AN ORGANIZED HEALTH CARE EDUCATION/TRAINING PROGRAM

## 2022-01-12 PROCEDURE — 25000003 PHARM REV CODE 250: Performed by: NURSE PRACTITIONER

## 2022-01-12 PROCEDURE — 80048 BASIC METABOLIC PNL TOTAL CA: CPT | Performed by: NURSE PRACTITIONER

## 2022-01-12 PROCEDURE — 85025 COMPLETE CBC W/AUTO DIFF WBC: CPT | Performed by: NURSE PRACTITIONER

## 2022-01-12 RX ORDER — LOSARTAN POTASSIUM 25 MG/1
25 TABLET ORAL DAILY
Status: DISCONTINUED | OUTPATIENT
Start: 2022-01-12 | End: 2022-01-16 | Stop reason: HOSPADM

## 2022-01-12 RX ADMIN — VANCOMYCIN HYDROCHLORIDE 1250 MG: 1.25 INJECTION, POWDER, LYOPHILIZED, FOR SOLUTION INTRAVENOUS at 12:01

## 2022-01-12 RX ADMIN — METOPROLOL SUCCINATE 25 MG: 25 TABLET, EXTENDED RELEASE ORAL at 08:01

## 2022-01-12 RX ADMIN — METRONIDAZOLE 500 MG: 500 TABLET ORAL at 02:01

## 2022-01-12 RX ADMIN — HYDROCODONE BITARTRATE AND ACETAMINOPHEN 1 TABLET: 7.5; 325 TABLET ORAL at 08:01

## 2022-01-12 RX ADMIN — METRONIDAZOLE 500 MG: 500 TABLET ORAL at 10:01

## 2022-01-12 RX ADMIN — LOSARTAN POTASSIUM 25 MG: 25 TABLET, FILM COATED ORAL at 02:01

## 2022-01-12 RX ADMIN — ACETAMINOPHEN 650 MG: 325 TABLET ORAL at 08:01

## 2022-01-12 RX ADMIN — CEFEPIME HYDROCHLORIDE 1 G: 1 INJECTION, SOLUTION INTRAVENOUS at 09:01

## 2022-01-12 RX ADMIN — VANCOMYCIN HYDROCHLORIDE 1250 MG: 1.25 INJECTION, POWDER, LYOPHILIZED, FOR SOLUTION INTRAVENOUS at 02:01

## 2022-01-12 RX ADMIN — ATORVASTATIN CALCIUM 40 MG: 40 TABLET, FILM COATED ORAL at 08:01

## 2022-01-12 RX ADMIN — CEFEPIME HYDROCHLORIDE 1 G: 1 INJECTION, SOLUTION INTRAVENOUS at 04:01

## 2022-01-12 RX ADMIN — ASPIRIN 81 MG: 81 TABLET, COATED ORAL at 08:01

## 2022-01-12 RX ADMIN — CEFEPIME HYDROCHLORIDE 1 G: 1 INJECTION, SOLUTION INTRAVENOUS at 06:01

## 2022-01-12 RX ADMIN — METRONIDAZOLE 500 MG: 500 TABLET ORAL at 05:01

## 2022-01-12 RX ADMIN — ENOXAPARIN SODIUM 40 MG: 100 INJECTION SUBCUTANEOUS at 06:01

## 2022-01-12 NOTE — CONSULTS
[facility] - Emergency Dept  Podiatry  Consult Note    Patient Name: Santiago Lucia  MRN: [id number]  Admission Date: 1/10/2022  Hospital Length of Stay: 0 days  Attending Physician: Theo Liriano, *  Primary Care Provider: Yon Asif MD     Consults  Subjective:     History of Present Illness:  Patient is a 60 y/o male  has a past medical history of Avascular necrosis of bone of hip, left, Coronary artery disease, DM w/o complication type II, and NSTEMI (non-ST elevated myocardial infarction). Admitted and consulted to podiatry for right great toe infection. States swelling started a few days ago and has been worsening. Relates to pain. No other pedal complaints.         Scheduled Meds:   aspirin  81 mg Oral Daily    atorvastatin  40 mg Oral Daily    ceFEPime (MAXIPIME) IVPB  1 g Intravenous Q8H    enoxaparin  40 mg Subcutaneous Daily    metoprolol succinate  25 mg Oral Daily    metroNIDAZOLE  500 mg Oral Q8H    vancomycin (VANCOCIN) IVPB  1,250 mg Intravenous Q12H     Continuous Infusions:  PRN Meds:acetaminophen, dextrose 50%, dextrose 50%, glucagon (human recombinant), glucose, glucose, HYDROcodone-acetaminophen, insulin aspart U-100, melatonin, naloxone, ondansetron, sodium chloride 0.9%, Pharmacy to dose Vancomycin consult **AND** vancomycin - pharmacy to dose    Review of patient's allergies indicates:  No Known Allergies     Past Medical History:   Diagnosis Date    Avascular necrosis of bone of hip, left 10/30/2018    Coronary artery disease     DM w/o complication type II 8/9/2013    NSTEMI (non-ST elevated myocardial infarction) 01/2013     Past Surgical History:   Procedure Laterality Date    CARDIAC CATHETERIZATION      CORONARY ANGIOPLASTY WITH STENT PLACEMENT  01/2013    [facility] RCA and LAD    LUMBAR LAMINECTOMY  2011    TOTAL HIP ARTHROPLASTY Right 12/2011            Family History    None       Tobacco Use    Smoking status: Former Smoker     Packs/day: 0.50     Years: 40.00      Pack years: 20.00     Types: Cigarettes     Quit date: 10/8/2019     Years since quittin.2    Smokeless tobacco: Never Used   Substance and Sexual Activity    Alcohol use: Yes     Comment: social    Drug use: No    Sexual activity: Not on file     Review of Systems   Constitutional: Negative for chills, diaphoresis, fatigue and fever.   HENT: Negative for congestion and hearing loss.    Respiratory: Negative for cough and shortness of breath.    Cardiovascular: Positive for leg swelling. Negative for chest pain.   Gastrointestinal: Negative for nausea and vomiting.   Musculoskeletal: Positive for back pain, gait problem and joint swelling.   Skin: Positive for color change and wound. Negative for pallor and rash.   Neurological: Positive for numbness. Negative for tremors, speech difficulty and weakness.   Psychiatric/Behavioral: Negative for agitation and confusion.     Objective:     Vital Signs (Most Recent):  Temp: 97.9 °F (36.6 °C) (22)  Pulse: 68 (22)  Resp: 18 (22)  BP: 128/88 (22)  SpO2: 100 % (22) Vital Signs (24h Range):  Temp:  [97.9 °F (36.6 °C)-98.5 °F (36.9 °C)] 97.9 °F (36.6 °C)  Pulse:  [68-84] 68  Resp:  [18-20] 18  SpO2:  [97 %-100 %] 100 %  BP: ()/(61-88) 128/88     Weight: 99.8 kg (220 lb)  Body mass index is 27.5 kg/m².    Foot Exam    General  General Appearance: appears stated age and healthy   Orientation: alert and oriented to person, place, and time   Affect: appropriate       Right Foot/Ankle     Inspection and Palpation  Ecchymosis: none  Skin Exam: blister, callus, drainage, cellulitis and ulcer;     Neurovascular  Dorsalis pedis: 1+  Posterior tibial: 1+  Saphenous nerve sensation: diminished  Tibial nerve sensation: diminished  Superficial peroneal nerve sensation: diminished  Deep peroneal nerve sensation: diminished  Sural nerve sensation: diminished      Left Foot/Ankle      Inspection and  Palpation  Ecchymosis: none  Skin Exam: skin intact;     Neurovascular  Dorsalis pedis: 1+  Posterior tibial: 1+  Saphenous nerve sensation: diminished  Tibial nerve sensation: diminished  Superficial peroneal nerve sensation: diminished  Deep peroneal nerve sensation: diminished  Sural nerve sensation: diminished            Laboratory:  A1C:   Recent Labs   Lab 07/30/21  1340 01/11/22 0309   HGBA1C 6.0* 6.0*     Blood Cultures:   Recent Labs   Lab 01/11/22  0005   LABBLOO No Growth to date  No Growth to date     CBC:   Recent Labs   Lab 01/11/22 0309   WBC 13.66*   RBC 4.37*   HGB 12.5*   HCT 38.8*      MCV 89   MCH 28.6   MCHC 32.2     CMP:   Recent Labs   Lab 01/10/22  2114 01/10/22  2114 01/11/22  0309      < > 125*   CALCIUM 9.9   < > 8.8   ALBUMIN 4.1  --   --    PROT 7.7  --   --       < > 138   K 4.1   < > 4.0   CO2 25   < > 24      < > 104   BUN 19   < > 21   CREATININE 1.1   < > 0.9   ALKPHOS 82  --   --    ALT 36  --   --    AST 22  --   --    BILITOT 0.4  --   --     < > = values in this interval not displayed.     CRP:   Recent Labs   Lab 01/10/22  2114   CRP 64.1*     ESR:   Recent Labs   Lab 01/10/22  2114   SEDRATE 34*     Wound Cultures: No results for input(s): LABAERO in the last 4320 hours.  Microbiology Results (last 7 days)     Procedure Component Value Units Date/Time    Blood culture #1 **CANNOT BE ORDERED STAT** [[id number]] Collected: 01/11/22 0005    Order Status: Completed Specimen: Blood Updated: 01/11/22 1715     Blood Culture, Routine No Growth to date    Blood culture #2 **CANNOT BE ORDERED STAT** [[id number]] Collected: 01/11/22 0005    Order Status: Completed Specimen: Blood Updated: 01/11/22 1715     Blood Culture, Routine No Growth to date    Culture, Anaerobe [[id number]] Collected: 01/11/22 0823    Order Status: Sent Specimen: Abscess from Foot, Right Updated: 01/11/22 1501    Aerobic culture [[id number]] Collected: 01/11/22 0823    Order Status: Sent  Specimen: Abscess from Foot, Right Updated: 01/11/22 1501        Specimen (24h ago, onward)            None          Diagnostic Results:  I have reviewed all pertinent imaging results/findings within the past 24 hours.    Clinical Findings:    Swelling to dorsal right great toe with plantar medial callus. Dorsum of toe with superficial fluctuance noted. Upon debridement with I&D, 20 cc of malodorous purulent drainage noted. Wound with mixed fibrogranular base to dorsum of hallux, measurements per debridement note. Does not probe to bone.                   Assessment/Plan:     * Cellulitis of great toe of right foot  -After verbal consent, debridement of ulcer performed today with I&D, see procedure note. Cultures were taken. Patient tolerated procedure well. Bleeding controlled with pressure and surgicel dressing. Dressed foot with toeball of betadine, 4x4 and light coban. Foot dressed with light kerlix and ACE. He is PWB to heel only in darco shoe.   -Rest, elevate. Strict offloading to wound site.   -Xray reviewed, MRI pending  -Antibiotics per hospital medicine, consider ID consult  -Podiatry will follow     Type 2 diabetes mellitus with diabetic peripheral angiopathy without gangrene  Per hospital medicine    PVD (peripheral vascular disease)  Recommend Interventional Cardiology consult, patient with history of PAD    CAD (coronary artery disease)  Per hospital medicine    Dyslipidemia  Per hospital medicine    HTN (hypertension)  Per hospital medicine        Thank you for your consult. I will follow-up with patient. Please contact us if you have any additional questions.    Jackie Calderon DPM  Podiatry  [place] - Emergency Dept

## 2022-01-12 NOTE — CONSULTS
Thank you for your consult to [facility]. We have reviewed the patient chart. This patient does not meet criteria for [facility] medicine service at this time due to Patient is a new admission and is still holding the ED - we are unable to do a virtual visit for a patient in the Emergency Department. Please re-consult once patient is in a floor room. Will hand back to In-house service.     Arlene Kurtz MD  [facility]

## 2022-01-12 NOTE — ASSESSMENT & PLAN NOTE
-After verbal consent, debridement of ulcer performed today with I&D, see procedure note. Cultures were taken. Patient tolerated procedure well. Bleeding controlled with pressure and surgicel dressing. Dressed foot with toeball of betadine, 4x4 and light coban. Foot dressed with light kerlix and ACE. He is PWB to heel only in darco shoe.   -Rest, elevate. Strict offloading to wound site.   -Xray reviewed, MRI pending  -Antibiotics per hospital medicine, consider ID consult  -Podiatry will follow

## 2022-01-12 NOTE — PLAN OF CARE
LAUREN spoke with pt, Nena Lucia (Mother) [phone] and  Adwoa Lucia (spouse) [phone] at bedside. Pt will be transported by Adwoa at time of discharge. No DME needs identified at this time. LAUREN provided information on dry-erase board and encouraged them to reach out for any needs. LAUREN discussed podiatry vs PCP follow up. Pt declines PCP follow up at this time. Case management will continue to follow for any medical changes that will effect the discharge plan.       01/12/22 1451   Discharge Assessment   Assessment Type Discharge Planning Assessment   Confirmed/corrected address, phone number and insurance Yes   Source of Information patient;family   Does patient/caregiver understand observation status Yes   Lives With spouse   Do you expect to return to your current living situation? Yes   Prior to hospitilization cognitive status: Alert/Oriented   Current cognitive status: Alert/Oriented   Walking or Climbing Stairs Difficulty none   Dressing/Bathing Difficulty none   Home Accessibility wheelchair accessible   Home Layout Able to live on 1st floor   Do you currently have service(s) that help you manage your care at home? Yes   Do you take prescription medications? Yes   Do you have prescription coverage? Yes   Do you have any problems affording any of your prescribed medications? No   Is the patient taking medications as prescribed? yes   How do you get to doctors appointments? family or friend will provide;car, drives self   Discharge Plan A Home   Discharge Plan B Home with family   DME Needed Upon Discharge  none;other (see comments)  (Adwoa asked about grab bar, not covered at this time)   Discharge Plan discussed with: Patient;Spouse/sig other   Name(s) and Number(s) Nena Lucia (Mother)   [phone]   Discharge Barriers Identified None   Relationship/Environment   Name(s) of Who Lives With Patient Adwoa Lucia  [phone]

## 2022-01-12 NOTE — PROGRESS NOTES
VN cued into room.  Pt resting comfortably in bed with call light and personal belongings within reach.  NADN.  Family at bedside.  Pt reports no pain.  No other needs or complaints at this time.  Reminded pt to use call light as needed.  VN will continue to follow and be available as needed.       01/11/22 2104   Admission   Initial VN Admission Questions Complete   Communication Issues? None   Shift   Virtual Nurse - Rounding Complete   Virtual Nurse - Patient Verbalized Approval Of Camera Use;VN Rounding   Type of Frequent Check   Type Other (see comments)  (admission)   Safety/Activity   Patient Rounds bed in low position;placement of personal items at bedside;bed wheels locked;call light in patient/parent reach;visualized patient;clutter free environment maintained   Safety Promotion/Fall Prevention family to remain at bedside   Positioning   Body Position supine   Head of Bed (HOB) Positioning HOB elevated

## 2022-01-12 NOTE — PROGRESS NOTES
Pharmacokinetic Assessment Follow Up: IV Vancomycin    Vancomycin serum concentration assessment(s):    The trough level was drawn correctly and can be used to guide therapy at this time. The measurement is above the desired definitive target range of 10 to 15 mcg/mL.    Vancomycin Regimen Plan:    Change regimen to Vancomycin 1250 mg IV every 24 hours with next serum trough concentration measured at 1300 prior to first dose on 01/ 13/2022    Drug levels (last 3 results):  Recent Labs   Lab Result Units 01/12/22  1118   Vancomycin-Trough ug/mL 18.9       Pharmacy will continue to follow and monitor vancomycin.    Please contact pharmacy at extension [phone] for questions regarding this assessment.    Thank you for the consult,   Patricio Thompson       Patient brief summary:  Santiago Lucia is a 61 y.o. male initiated on antimicrobial therapy with IV Vancomycin for treatment of skin & soft tissue infection    The patient's current regimen is Vancomycin 1250 mg every 12 hours    Drug Allergies:   Review of patient's allergies indicates:  No Known Allergies    Actual Body Weight:   99.2 kg    Renal Function:   Estimated Creatinine Clearance: 103 mL/min (based on SCr of 0.9 mg/dL).,     Dialysis Method (if applicable):  N/A    CBC (last 72 hours):  Recent Labs   Lab Result Units 01/10/22  2114 01/11/22  0309 01/12/22  0425   WBC K/uL 16.89* 13.66* 11.38   Hemoglobin g/dL 14.2 12.5* 12.5*   Hemoglobin A1C %  --  6.0*  --    Hematocrit % 43.6 38.8* 39.7*   Platelets K/uL 198 181 203   Gran % % 73.3* 67.2 63.4   Lymph % % 15.0* 20.5 22.5   Mono % % 9.4 9.3 10.3   Eosinophil % % 1.5 1.8 2.3   Basophil % % 0.4 0.5 0.9   Differential Method  Automated Automated Automated       Metabolic Panel (last 72 hours):  Recent Labs   Lab Result Units 01/10/22  2114 01/11/22  0309 01/12/22  0425   Sodium mmol/L 141 138 136   Potassium mmol/L 4.1 4.0 4.3   Chloride mmol/L 103 104 104   CO2 mmol/L 25 24 21*   Glucose mg/dL 106 125* 103   BUN  mg/dL 19 21 21   Creatinine mg/dL 1.1 0.9 0.9   Albumin g/dL 4.1  --   --    Total Bilirubin mg/dL 0.4  --   --    Alkaline Phosphatase U/L 82  --   --    AST U/L 22  --   --    ALT U/L 36  --   --    Magnesium mg/dL  --  2.2 2.1       Vancomycin Administrations:  vancomycin given in the last 96 hours                     vancomycin 1.25 g in dextrose 5% 250 mL IVPB (ready to mix) (mg) 1,250 mg New Bag 01/12/22 1417     1,250 mg New Bag  0024      Restarted 01/11/22 1541     1,250 mg New Bag  1432    vancomycin (VANCOCIN) 2,250 mg in dextrose 5 % 500 mL IVPB (mg) 2,250 mg New Bag 01/11/22 0031                    Microbiologic Results:  Microbiology Results (last 7 days)       Procedure Component Value Units Date/Time    Blood culture #2 **CANNOT BE ORDERED STAT** [[id number]] Collected: 01/11/22 0005    Order Status: Completed Specimen: Blood Updated: 01/12/22 1022     Blood Culture, Routine No Growth to date      No Growth to date    Blood culture #1 **CANNOT BE ORDERED STAT** [[id number]] Collected: 01/11/22 0005    Order Status: Completed Specimen: Blood Updated: 01/12/22 1022     Blood Culture, Routine No Growth to date      No Growth to date    Culture, Anaerobe [[id number]] Collected: 01/11/22 0823    Order Status: Completed Specimen: Abscess from Foot, Right Updated: 01/12/22 0718     Anaerobic Culture Culture in progress    Aerobic culture [[id number]] Collected: 01/11/22 0823    Order Status: Completed Specimen: Abscess from Foot, Right Updated: 01/12/22 0715     Aerobic Bacterial Culture No growth

## 2022-01-12 NOTE — PLAN OF CARE
AAO,VSS,ED admission earlier in shift,rt foot dressing dry and intact,POC reviewed,stated understanding,bed alarm set

## 2022-01-12 NOTE — SUBJECTIVE & OBJECTIVE
Interval History:  Doing well, no complaints. Has multiple questions about plan of care. Waiting for Podiatry to review MRI and recommendations.    Review of Systems   Constitutional: Negative for fever.   Respiratory: Negative for shortness of breath.    Skin: Positive for rash and wound.   Neurological: Positive for numbness. Negative for dizziness and light-headedness.     Objective:     Vital Signs (Most Recent):  Temp: 97.8 °F (36.6 °C) (01/12/22 1142)  Pulse: 72 (01/12/22 1142)  Resp: 18 (01/12/22 1142)  BP: (!) 153/70 (01/12/22 1142)  SpO2: 98 % (01/12/22 1142) Vital Signs (24h Range):  Temp:  [97.3 °F (36.3 °C)-98.5 °F (36.9 °C)] 97.8 °F (36.6 °C)  Pulse:  [68-93] 72  Resp:  [16-20] 18  SpO2:  [97 %-100 %] 98 %  BP: ()/(63-88) 153/70     Weight: 99.2 kg (218 lb 11.1 oz)  Body mass index is 27.34 kg/m².    Intake/Output Summary (Last 24 hours) at 1/12/2022 1211  Last data filed at 1/12/2022 0858  Gross per 24 hour   Intake 1264.69 ml   Output 1400 ml   Net -135.31 ml      Physical Exam  Vitals and nursing note reviewed.   Constitutional:       General: He is not in acute distress.     Appearance: Normal appearance. He is not toxic-appearing.   HENT:      Head: Normocephalic and atraumatic.      Nose: Nose normal.      Mouth/Throat:      Mouth: Mucous membranes are moist.   Eyes:      Pupils: Pupils are equal, round, and reactive to light.   Cardiovascular:      Rate and Rhythm: Normal rate and regular rhythm.      Pulses: Normal pulses.      Heart sounds: Normal heart sounds.   Pulmonary:      Effort: Pulmonary effort is normal.      Breath sounds: Normal breath sounds.   Abdominal:      General: Bowel sounds are normal.      Palpations: Abdomen is soft.   Musculoskeletal:         General: Swelling and tenderness present. Normal range of motion.      Cervical back: Normal range of motion.   Skin:     General: Skin is warm and dry.      Findings: Erythema present.      Comments: Foot wrapped. Did not  unwrap   Neurological:      Mental Status: He is alert and oriented to person, place, and time.   Psychiatric:         Behavior: Behavior normal.         Thought Content: Thought content normal.         Significant Labs: All pertinent labs within the past 24 hours have been reviewed.    Significant Imaging: I have reviewed all pertinent imaging results/findings within the past 24 hours.

## 2022-01-12 NOTE — SUBJECTIVE & OBJECTIVE
Scheduled Meds:   aspirin  81 mg Oral Daily    atorvastatin  40 mg Oral Daily    ceFEPime (MAXIPIME) IVPB  1 g Intravenous Q8H    enoxaparin  40 mg Subcutaneous Daily    metoprolol succinate  25 mg Oral Daily    metroNIDAZOLE  500 mg Oral Q8H    vancomycin (VANCOCIN) IVPB  1,250 mg Intravenous Q12H     Continuous Infusions:  PRN Meds:acetaminophen, dextrose 50%, dextrose 50%, glucagon (human recombinant), glucose, glucose, HYDROcodone-acetaminophen, insulin aspart U-100, melatonin, naloxone, ondansetron, sodium chloride 0.9%, Pharmacy to dose Vancomycin consult **AND** vancomycin - pharmacy to dose    Review of patient's allergies indicates:  No Known Allergies     Past Medical History:   Diagnosis Date    Avascular necrosis of bone of hip, left 10/30/2018    Coronary artery disease     DM w/o complication type II 2013    NSTEMI (non-ST elevated myocardial infarction) 2013     Past Surgical History:   Procedure Laterality Date    CARDIAC CATHETERIZATION      CORONARY ANGIOPLASTY WITH STENT PLACEMENT  2013    [facility] RCA and LAD    LUMBAR LAMINECTOMY      TOTAL HIP ARTHROPLASTY Right 2011            Family History    None       Tobacco Use    Smoking status: Former Smoker     Packs/day: 0.50     Years: 40.00     Pack years: 20.00     Types: Cigarettes     Quit date: 10/8/2019     Years since quittin.2    Smokeless tobacco: Never Used   Substance and Sexual Activity    Alcohol use: Yes     Comment: social    Drug use: No    Sexual activity: Not on file     Review of Systems   Constitutional: Negative for chills, diaphoresis, fatigue and fever.   HENT: Negative for congestion and hearing loss.    Respiratory: Negative for cough and shortness of breath.    Cardiovascular: Positive for leg swelling. Negative for chest pain.   Gastrointestinal: Negative for nausea and vomiting.   Musculoskeletal: Positive for back pain, gait problem and joint swelling.   Skin: Positive for color change  and wound. Negative for pallor and rash.   Neurological: Positive for numbness. Negative for tremors, speech difficulty and weakness.   Psychiatric/Behavioral: Negative for agitation and confusion.     Objective:     Vital Signs (Most Recent):  Temp: 97.9 °F (36.6 °C) (01/11/22 1931)  Pulse: 68 (01/11/22 1830)  Resp: 18 (01/11/22 1830)  BP: 128/88 (01/11/22 1830)  SpO2: 100 % (01/11/22 1830) Vital Signs (24h Range):  Temp:  [97.9 °F (36.6 °C)-98.5 °F (36.9 °C)] 97.9 °F (36.6 °C)  Pulse:  [68-84] 68  Resp:  [18-20] 18  SpO2:  [97 %-100 %] 100 %  BP: ()/(61-88) 128/88     Weight: 99.8 kg (220 lb)  Body mass index is 27.5 kg/m².    Foot Exam    General  General Appearance: appears stated age and healthy   Orientation: alert and oriented to person, place, and time   Affect: appropriate       Right Foot/Ankle     Inspection and Palpation  Ecchymosis: none  Skin Exam: blister, callus, drainage, cellulitis and ulcer;     Neurovascular  Dorsalis pedis: 1+  Posterior tibial: 1+  Saphenous nerve sensation: diminished  Tibial nerve sensation: diminished  Superficial peroneal nerve sensation: diminished  Deep peroneal nerve sensation: diminished  Sural nerve sensation: diminished      Left Foot/Ankle      Inspection and Palpation  Ecchymosis: none  Skin Exam: skin intact;     Neurovascular  Dorsalis pedis: 1+  Posterior tibial: 1+  Saphenous nerve sensation: diminished  Tibial nerve sensation: diminished  Superficial peroneal nerve sensation: diminished  Deep peroneal nerve sensation: diminished  Sural nerve sensation: diminished            Laboratory:  A1C:   Recent Labs   Lab 07/30/21  1340 01/11/22  0309   HGBA1C 6.0* 6.0*     Blood Cultures:   Recent Labs   Lab 01/11/22  0005   LABBLOO No Growth to date  No Growth to date     CBC:   Recent Labs   Lab 01/11/22  0309   WBC 13.66*   RBC 4.37*   HGB 12.5*   HCT 38.8*      MCV 89   MCH 28.6   MCHC 32.2     CMP:   Recent Labs   Lab 01/10/22  2114 01/10/22  2114  01/11/22  0309      < > 125*   CALCIUM 9.9   < > 8.8   ALBUMIN 4.1  --   --    PROT 7.7  --   --       < > 138   K 4.1   < > 4.0   CO2 25   < > 24      < > 104   BUN 19   < > 21   CREATININE 1.1   < > 0.9   ALKPHOS 82  --   --    ALT 36  --   --    AST 22  --   --    BILITOT 0.4  --   --     < > = values in this interval not displayed.     CRP:   Recent Labs   Lab 01/10/22  2114   CRP 64.1*     ESR:   Recent Labs   Lab 01/10/22  2114   SEDRATE 34*     Wound Cultures: No results for input(s): LABAERO in the last 4320 hours.  Microbiology Results (last 7 days)     Procedure Component Value Units Date/Time    Blood culture #1 **CANNOT BE ORDERED STAT** [[id number]] Collected: 01/11/22 0005    Order Status: Completed Specimen: Blood Updated: 01/11/22 1715     Blood Culture, Routine No Growth to date    Blood culture #2 **CANNOT BE ORDERED STAT** [[id number]] Collected: 01/11/22 0005    Order Status: Completed Specimen: Blood Updated: 01/11/22 1715     Blood Culture, Routine No Growth to date    Culture, Anaerobe [[id number]] Collected: 01/11/22 0823    Order Status: Sent Specimen: Abscess from Foot, Right Updated: 01/11/22 1501    Aerobic culture [[id number]] Collected: 01/11/22 0823    Order Status: Sent Specimen: Abscess from Foot, Right Updated: 01/11/22 1501        Specimen (24h ago, onward)            None          Diagnostic Results:  I have reviewed all pertinent imaging results/findings within the past 24 hours.    Clinical Findings:    Swelling to dorsal right great toe with plantar medial callus. Dorsum of toe with superficial fluctuance noted. Upon debridement with I&D, 20 cc of malodorous purulent drainage noted. Wound with mixed fibrogranular base to dorsum of hallux, measurements per debridement note. Does not probe to bone.

## 2022-01-12 NOTE — PROCEDURES
"Santiago Lucia is a 61 y.o. male patient.    Temp: 97.9 °F (36.6 °C) (01/11/22 1931)  Pulse: 68 (01/11/22 1830)  Resp: 18 (01/11/22 1830)  BP: 128/88 (01/11/22 1830)  SpO2: 100 % (01/11/22 1830)  Weight: 99.8 kg (220 lb) (01/10/22 1934)  Height: 6' 3" (190.5 cm) (01/10/22 1934)       Debridement    Date/Time: 1/11/2022 8:17 AM  Performed by: Jackie Calderon DPM  Authorized by: Jackie Calderon DPM     Consent Done?:  Yes (Verbal)  Local anesthesia used?: No      Wound Details:    Location:  Right foot    Location:  Right 1st Toe    Type of Debridement:  Excisional       Length (cm):  1.5       Area (sq cm):  2.25       Width (cm):  1.5       Percent Debrided (%):  100       Depth (cm):  0.3       Total Area Debrided (sq cm):  2.25    Depth of debridement:  Muscle/fascia/tendon    Tissue debrided:  Subcutaneous, Other and Muscle    Devitalized tissue debrided:  Biofilm and Callus    Instruments:  Blade    Bleeding:  Moderate  Hemostasis Achieved: Yes    Method Used:  Pressure and Other (surgicel)  Patient tolerance:  Patient tolerated the procedure well with no immediate complications     Cultures were taken         1/11/2022    "

## 2022-01-12 NOTE — PROGRESS NOTES
[facility]  Progress Note    Patient Name: Santiago Lucia  MRN: [id number]  Patient Class: OP- Observation   Admission Date: 1/10/2022  Length of Stay: 0 days  Attending Physician: Ari Hudson MD  Primary Care Provider: Yon Asif MD        Subjective:     Principal Problem:Cellulitis of great toe of right foot        HPI:  Santiago Lucia is a 62 yo male with a pmh of CAD, NSTEMI, HTN, PVD, gout. He presented with worsening pain and swelling of right great toe x 3 days. Today he noted his toe was throbbing and his entire right foot had swollen. He has been seeing Dr Stevenson with podiatry for chronic issues with the toe including callus formations. He denies drainage to the toe. He reports history of gout with 3 attacks in the past. He denies having diabetes and does not take any meds for this. No chills or fevers, no nausea. No other complaints.     In the ED, he was noted to be tachycardic and had WBC 16. Foot XRAY with cellulitis.       Overview/Hospital Course:  No notes on file    Interval History:  Doing well, no complaints. Has multiple questions about plan of care. Waiting for Podiatry to review MRI and recommendations.    Review of Systems   Constitutional: Negative for fever.   Respiratory: Negative for shortness of breath.    Skin: Positive for rash and wound.   Neurological: Positive for numbness. Negative for dizziness and light-headedness.     Objective:     Vital Signs (Most Recent):  Temp: 97.8 °F (36.6 °C) (01/12/22 1142)  Pulse: 72 (01/12/22 1142)  Resp: 18 (01/12/22 1142)  BP: (!) 153/70 (01/12/22 1142)  SpO2: 98 % (01/12/22 1142) Vital Signs (24h Range):  Temp:  [97.3 °F (36.3 °C)-98.5 °F (36.9 °C)] 97.8 °F (36.6 °C)  Pulse:  [68-93] 72  Resp:  [16-20] 18  SpO2:  [97 %-100 %] 98 %  BP: ()/(63-88) 153/70     Weight: 99.2 kg (218 lb 11.1 oz)  Body mass index is 27.34 kg/m².    Intake/Output Summary (Last 24 hours) at 1/12/2022 1211  Last data filed at  1/12/2022 0858  Gross per 24 hour   Intake 1264.69 ml   Output 1400 ml   Net -135.31 ml      Physical Exam  Vitals and nursing note reviewed.   Constitutional:       General: He is not in acute distress.     Appearance: Normal appearance. He is not toxic-appearing.   HENT:      Head: Normocephalic and atraumatic.      Nose: Nose normal.      Mouth/Throat:      Mouth: Mucous membranes are moist.   Eyes:      Pupils: Pupils are equal, round, and reactive to light.   Cardiovascular:      Rate and Rhythm: Normal rate and regular rhythm.      Pulses: Normal pulses.      Heart sounds: Normal heart sounds.   Pulmonary:      Effort: Pulmonary effort is normal.      Breath sounds: Normal breath sounds.   Abdominal:      General: Bowel sounds are normal.      Palpations: Abdomen is soft.   Musculoskeletal:         General: Swelling and tenderness present. Normal range of motion.      Cervical back: Normal range of motion.   Skin:     General: Skin is warm and dry.      Findings: Erythema present.      Comments: Foot wrapped. Did not unwrap   Neurological:      Mental Status: He is alert and oriented to person, place, and time.   Psychiatric:         Behavior: Behavior normal.         Thought Content: Thought content normal.         Significant Labs: All pertinent labs within the past 24 hours have been reviewed.    Significant Imaging: I have reviewed all pertinent imaging results/findings within the past 24 hours.      Assessment/Plan:      * Cellulitis of great toe of right foot  Presents with pain and swelling to right great toe, swelling extends to foot  WBC 16, XRAY with cellulitis  Foot is warm to touch with erythema and edema noted, right great toe with extensive swelling noted, dry calluses under toe.  Given vanc and rocephin in ED  -cont vanc and switch to cefepime/Flagyl  -MRI noted and likely acute osteomyelitis  -Podiatry consulted and appreciate recommendations  -Pending tissue culture data to tailor  antibiotics    Type 2 diabetes mellitus with diabetic peripheral angiopathy without gangrene  Diet controlled  A1C 6.0  accuchecks and SSI      PVD (peripheral vascular disease)  Cont statin      CAD (coronary artery disease)  Cont ASA, statin      Dyslipidemia  Cont statin      HTN (hypertension)  Cont metoprolol  - add Losartan as not controlled today    VTE Risk Mitigation (From admission, onward)         Ordered     enoxaparin injection 40 mg  Daily         01/11/22 0001     IP VTE HIGH RISK PATIENT  Once         01/11/22 0001     Place sequential compression device  Until discontinued         01/11/22 0001                Discharge Planning   KIMBERLY: 1/13/2022     Code Status: Full Code   Is the patient medically ready for discharge?:     Reason for patient still in hospital (select all that apply): Treatment                     Ari Hudson MD  Department of Hospital Medicine   [facility] S[facility]

## 2022-01-12 NOTE — PROCEDURES
"Santiago Lucia is a 61 y.o. male patient.    Temp: 97.9 °F (36.6 °C) (01/11/22 1931)  Pulse: 68 (01/11/22 1830)  Resp: 18 (01/11/22 1830)  BP: 128/88 (01/11/22 1830)  SpO2: 100 % (01/11/22 1830)  Weight: 99.8 kg (220 lb) (01/10/22 1934)  Height: 6' 3" (190.5 cm) (01/10/22 1934)       Incision and Drainage    Date/Time: 1/11/2022 8:11 AM  Location procedure was performed: [facility] EMERGENCY DEPARTMENT  Performed by: Jackie Calderon DPM  Authorized by: Jackie Calderon DPM   Pre-operative diagnosis: abscess  Post-operative diagnosis: same  Consent Done: Yes  Consent: Verbal consent obtained.  Risks and benefits: risks, benefits and alternatives were discussed  Consent given by: patient  Type: abscess  Body area: lower extremity  Location details: right big toe  Scalpel size: 15  Incision type: single straight  Complexity: simple  Drainage: pus (20 cc of pus drained from great toe)  Drainage amount: moderate  Wound treatment: incision and  wound left open  Complications: No  Estimated blood loss (mL): 3 (Bleeding controlled with pressure and surgicel)  Patient tolerance: Patient tolerated the procedure well with no immediate complications          1/11/2022    "

## 2022-01-12 NOTE — ASSESSMENT & PLAN NOTE
Presents with pain and swelling to right great toe, swelling extends to foot  WBC 16, XRAY with cellulitis  Foot is warm to touch with erythema and edema noted, right great toe with extensive swelling noted, dry calluses under toe.  Given vanc and rocephin in ED  -cont vanc and switch to cefepime/Flagyl  -MRI noted and likely acute osteomyelitis  -Podiatry consulted and appreciate recommendations  -Pending tissue culture data to tailor antibiotics

## 2022-01-12 NOTE — HPI
Patient is a 62 y/o male  has a past medical history of Avascular necrosis of bone of hip, left, Coronary artery disease, DM w/o complication type II, and NSTEMI (non-ST elevated myocardial infarction). Admitted and consulted to podiatry for right great toe infection. States swelling started a few days ago and has been worsening. Relates to pain. No other pedal complaints.

## 2022-01-13 LAB
ANION GAP SERPL CALC-SCNC: 7 MMOL/L (ref 8–16)
BASOPHILS # BLD AUTO: 0.08 K/UL (ref 0–0.2)
BASOPHILS NFR BLD: 0.7 % (ref 0–1.9)
BUN SERPL-MCNC: 24 MG/DL (ref 8–23)
CALCIUM SERPL-MCNC: 9.1 MG/DL (ref 8.7–10.5)
CHLORIDE SERPL-SCNC: 105 MMOL/L (ref 95–110)
CO2 SERPL-SCNC: 26 MMOL/L (ref 23–29)
CREAT SERPL-MCNC: 0.8 MG/DL (ref 0.5–1.4)
DIFFERENTIAL METHOD: ABNORMAL
EOSINOPHIL # BLD AUTO: 0.2 K/UL (ref 0–0.5)
EOSINOPHIL NFR BLD: 2 % (ref 0–8)
ERYTHROCYTE [DISTWIDTH] IN BLOOD BY AUTOMATED COUNT: 14 % (ref 11.5–14.5)
EST. GFR  (AFRICAN AMERICAN): >60 ML/MIN/1.73 M^2
EST. GFR  (NON AFRICAN AMERICAN): >60 ML/MIN/1.73 M^2
GLUCOSE SERPL-MCNC: 101 MG/DL (ref 70–110)
HCT VFR BLD AUTO: 39.1 % (ref 40–54)
HGB BLD-MCNC: 12.7 G/DL (ref 14–18)
IMM GRANULOCYTES # BLD AUTO: 0.08 K/UL (ref 0–0.04)
IMM GRANULOCYTES NFR BLD AUTO: 0.7 % (ref 0–0.5)
LYMPHOCYTES # BLD AUTO: 3 K/UL (ref 1–4.8)
LYMPHOCYTES NFR BLD: 25.3 % (ref 18–48)
MAGNESIUM SERPL-MCNC: 2 MG/DL (ref 1.6–2.6)
MCH RBC QN AUTO: 28.3 PG (ref 27–31)
MCHC RBC AUTO-ENTMCNC: 32.5 G/DL (ref 32–36)
MCV RBC AUTO: 87 FL (ref 82–98)
MONOCYTES # BLD AUTO: 1.4 K/UL (ref 0.3–1)
MONOCYTES NFR BLD: 11.3 % (ref 4–15)
NEUTROPHILS # BLD AUTO: 7.2 K/UL (ref 1.8–7.7)
NEUTROPHILS NFR BLD: 60 % (ref 38–73)
NRBC BLD-RTO: 0 /100 WBC
PLATELET # BLD AUTO: 206 K/UL (ref 150–450)
PMV BLD AUTO: 10.2 FL (ref 9.2–12.9)
POCT GLUCOSE: 113 MG/DL (ref 70–110)
POCT GLUCOSE: 129 MG/DL (ref 70–110)
POCT GLUCOSE: 89 MG/DL (ref 70–110)
POCT GLUCOSE: 97 MG/DL (ref 70–110)
POTASSIUM SERPL-SCNC: 4.4 MMOL/L (ref 3.5–5.1)
RBC # BLD AUTO: 4.49 M/UL (ref 4.6–6.2)
SODIUM SERPL-SCNC: 138 MMOL/L (ref 136–145)
WBC # BLD AUTO: 11.96 K/UL (ref 3.9–12.7)

## 2022-01-13 PROCEDURE — 11042 DEBRIDEMENT: ICD-10-PCS | Mod: ,,, | Performed by: STUDENT IN AN ORGANIZED HEALTH CARE EDUCATION/TRAINING PROGRAM

## 2022-01-13 PROCEDURE — 25000003 PHARM REV CODE 250: Performed by: NURSE PRACTITIONER

## 2022-01-13 PROCEDURE — 97530 THERAPEUTIC ACTIVITIES: CPT

## 2022-01-13 PROCEDURE — 99233 SBSQ HOSP IP/OBS HIGH 50: CPT | Mod: 25,,, | Performed by: STUDENT IN AN ORGANIZED HEALTH CARE EDUCATION/TRAINING PROGRAM

## 2022-01-13 PROCEDURE — 87176 TISSUE HOMOGENIZATION CULTR: CPT | Performed by: STUDENT IN AN ORGANIZED HEALTH CARE EDUCATION/TRAINING PROGRAM

## 2022-01-13 PROCEDURE — 87075 CULTR BACTERIA EXCEPT BLOOD: CPT | Performed by: STUDENT IN AN ORGANIZED HEALTH CARE EDUCATION/TRAINING PROGRAM

## 2022-01-13 PROCEDURE — 85025 COMPLETE CBC W/AUTO DIFF WBC: CPT | Performed by: NURSE PRACTITIONER

## 2022-01-13 PROCEDURE — 63600175 PHARM REV CODE 636 W HCPCS: Performed by: STUDENT IN AN ORGANIZED HEALTH CARE EDUCATION/TRAINING PROGRAM

## 2022-01-13 PROCEDURE — 25000003 PHARM REV CODE 250: Performed by: STUDENT IN AN ORGANIZED HEALTH CARE EDUCATION/TRAINING PROGRAM

## 2022-01-13 PROCEDURE — 83735 ASSAY OF MAGNESIUM: CPT | Performed by: NURSE PRACTITIONER

## 2022-01-13 PROCEDURE — 97535 SELF CARE MNGMENT TRAINING: CPT

## 2022-01-13 PROCEDURE — 63600175 PHARM REV CODE 636 W HCPCS: Performed by: HOSPITALIST

## 2022-01-13 PROCEDURE — 97161 PT EVAL LOW COMPLEX 20 MIN: CPT

## 2022-01-13 PROCEDURE — 63600175 PHARM REV CODE 636 W HCPCS: Performed by: NURSE PRACTITIONER

## 2022-01-13 PROCEDURE — 21400001 HC TELEMETRY ROOM

## 2022-01-13 PROCEDURE — 99233 PR SUBSEQUENT HOSPITAL CARE,LEVL III: ICD-10-PCS | Mod: 25,,, | Performed by: STUDENT IN AN ORGANIZED HEALTH CARE EDUCATION/TRAINING PROGRAM

## 2022-01-13 PROCEDURE — 11042 DBRDMT SUBQ TIS 1ST 20SQCM/<: CPT | Mod: ,,, | Performed by: STUDENT IN AN ORGANIZED HEALTH CARE EDUCATION/TRAINING PROGRAM

## 2022-01-13 PROCEDURE — 80048 BASIC METABOLIC PNL TOTAL CA: CPT | Performed by: NURSE PRACTITIONER

## 2022-01-13 PROCEDURE — 36415 COLL VENOUS BLD VENIPUNCTURE: CPT | Performed by: NURSE PRACTITIONER

## 2022-01-13 PROCEDURE — 87070 CULTURE OTHR SPECIMN AEROBIC: CPT | Performed by: STUDENT IN AN ORGANIZED HEALTH CARE EDUCATION/TRAINING PROGRAM

## 2022-01-13 PROCEDURE — 20220 PR BONE BIOPSY,TROCAR/NEEDLE SUPERF: ICD-10-PCS | Mod: ,,, | Performed by: STUDENT IN AN ORGANIZED HEALTH CARE EDUCATION/TRAINING PROGRAM

## 2022-01-13 PROCEDURE — 25000003 PHARM REV CODE 250: Performed by: HOSPITALIST

## 2022-01-13 PROCEDURE — 20220 BONE BIOPSY TROCAR/NDL SUPFC: CPT | Mod: ,,, | Performed by: STUDENT IN AN ORGANIZED HEALTH CARE EDUCATION/TRAINING PROGRAM

## 2022-01-13 PROCEDURE — 97165 OT EVAL LOW COMPLEX 30 MIN: CPT

## 2022-01-13 RX ORDER — LIDOCAINE HYDROCHLORIDE 10 MG/ML
5 INJECTION, SOLUTION EPIDURAL; INFILTRATION; INTRACAUDAL; PERINEURAL ONCE
Status: COMPLETED | OUTPATIENT
Start: 2022-01-13 | End: 2022-01-13

## 2022-01-13 RX ORDER — HYDROCODONE BITARTRATE AND ACETAMINOPHEN 7.5; 325 MG/1; MG/1
1 TABLET ORAL EVERY 4 HOURS PRN
Status: DISCONTINUED | OUTPATIENT
Start: 2022-01-13 | End: 2022-01-16 | Stop reason: HOSPADM

## 2022-01-13 RX ADMIN — LOSARTAN POTASSIUM 25 MG: 25 TABLET, FILM COATED ORAL at 09:01

## 2022-01-13 RX ADMIN — METRONIDAZOLE 500 MG: 500 TABLET ORAL at 05:01

## 2022-01-13 RX ADMIN — METRONIDAZOLE 500 MG: 500 TABLET ORAL at 01:01

## 2022-01-13 RX ADMIN — ENOXAPARIN SODIUM 40 MG: 100 INJECTION SUBCUTANEOUS at 05:01

## 2022-01-13 RX ADMIN — ASPIRIN 81 MG: 81 TABLET, COATED ORAL at 09:01

## 2022-01-13 RX ADMIN — CEFEPIME HYDROCHLORIDE 1 G: 1 INJECTION, SOLUTION INTRAVENOUS at 01:01

## 2022-01-13 RX ADMIN — CEFEPIME HYDROCHLORIDE 1 G: 1 INJECTION, SOLUTION INTRAVENOUS at 05:01

## 2022-01-13 RX ADMIN — METOPROLOL SUCCINATE 25 MG: 25 TABLET, EXTENDED RELEASE ORAL at 09:01

## 2022-01-13 RX ADMIN — CEFEPIME HYDROCHLORIDE 1 G: 1 INJECTION, SOLUTION INTRAVENOUS at 11:01

## 2022-01-13 RX ADMIN — LIDOCAINE HYDROCHLORIDE 50 MG: 10 INJECTION, SOLUTION EPIDURAL; INFILTRATION; INTRACAUDAL; PERINEURAL at 01:01

## 2022-01-13 RX ADMIN — ACETAMINOPHEN 650 MG: 325 TABLET ORAL at 08:01

## 2022-01-13 RX ADMIN — METRONIDAZOLE 500 MG: 500 TABLET ORAL at 09:01

## 2022-01-13 RX ADMIN — HYDROCODONE BITARTRATE AND ACETAMINOPHEN 1 TABLET: 7.5; 325 TABLET ORAL at 01:01

## 2022-01-13 RX ADMIN — HYDROCODONE BITARTRATE AND ACETAMINOPHEN 1 TABLET: 7.5; 325 TABLET ORAL at 09:01

## 2022-01-13 RX ADMIN — VANCOMYCIN HYDROCHLORIDE 1250 MG: 1.25 INJECTION, POWDER, LYOPHILIZED, FOR SOLUTION INTRAVENOUS at 09:01

## 2022-01-13 RX ADMIN — ATORVASTATIN CALCIUM 40 MG: 40 TABLET, FILM COATED ORAL at 09:01

## 2022-01-13 RX ADMIN — VANCOMYCIN HYDROCHLORIDE 1250 MG: 1.25 INJECTION, POWDER, LYOPHILIZED, FOR SOLUTION INTRAVENOUS at 08:01

## 2022-01-13 NOTE — PT/OT/SLP EVAL
"Occupational Therapy   Evaluation/Treatment/DC summary     Name: Santiago Lucia  MRN: [id number]  Admitting Diagnosis:  Cellulitis of great toe of right foot  Recent Surgery: * No surgery found *      Recommendations:     Discharge Recommendations: home  Discharge Equipment Recommendations:  none  Barriers to discharge:  None    Assessment:     Santiago Lucia is a 61 y.o. male with a medical diagnosis of Cellulitis of great toe of right foot.  He presents with The primary encounter diagnosis was Cellulitis of right toe. Diagnoses of Tachycardia and Chest pain were also pertinent to this visit.  . Performance deficits affecting function: impaired skin,orthopedic precautions,edema.      Pt performing ADLs and functional mobility at baseline at this time. Ordered and discussed forefoot offloading shoe with podiatry to be delivered to pt's room. Pt able to maintain heel WB to RLE during mobility. Educated on impt of wound healing. No further OT needs. D/c OT    Rehab Prognosis: Good; patient would benefit from acute skilled OT services to address these deficits and reach maximum level of function.       Plan:     Patient to be seen  (d/c OT) to address the above listed problems via    · Plan of Care Expires: 01/13/22  · Plan of Care Reviewed with: patient,spouse    Subjective     Chief Complaint: Would like to shower   Patient/Family Comments/goals: return home     Occupational Profile:  Living Environment: with spouse in "1.5" story home, threshold to enter, bedroom and WIS downstairs   Previous level of function: independent; drives; works as a salesman   Equipment Used at Home:  cane, straight  Assistance upon Discharge: from family if required     Pain/Comfort:  · Pain Rating 1: 0/10    Patients cultural, spiritual, Mu-ism conflicts given the current situation:      Objective:     Communicated with: sissy prior to session.    General Precautions: Standard, fall   Orthopedic Precautions: (RLE heel WB)   Braces:  " (forefoot offloading DARCO)    Occupational Performance:    Bed Mobility:    · Patient completed Scooting/Bridging with modified independence  · Patient completed Supine to Sit with modified independence  · Patient completed Sit to Supine with modified independence    Functional Mobility/Transfers:  · Patient completed Sit <> Stand Transfer with modified independence  with  no assistive device   · Patient completed Toilet Transfer Step Transfer technique with modified independence with  no AD  · Functional Mobility: mod I without AD in room     Activities of Daily Living:  · Grooming: modified independence    · Toileting: modified independence      Cognitive/Visual Perceptual:  WFL     Physical Exam:  Balance:    -       WFL   Postural examination/scapula alignment:    -       Rounded shoulders  Skin integrity: Wound R great toe  Upper Extremity Range of Motion:   BUE WFL based on observed function   Upper Extremity Strength:  BUE WFL based on observed function     AMPAC 6 Click ADL:  AMPAC Total Score: 24    Treatment & Education:  Pt educated on wound healing and appropriate measures to take with ADLs/functional mobility to aid in wound healing   Called to order DARCO shoe; discussed with podiatry over secure chat   Educated pt on appropriate donning/doffing of shoe   Functional mobility performed in room without shoe while maintaining heel WB to perform ADLs as above   Education:    Patient left supine with all lines intact, call button in reach, nsg notified and spouse and PCT  present    GOALS:   Multidisciplinary Problems     Occupational Therapy Goals        Problem: Occupational Therapy Goal    Goal Priority Disciplines Outcome Interventions   Occupational Therapy Goal     OT, PT/OT Adequate for Care Transition                    History:     Past Medical History:   Diagnosis Date    Avascular necrosis of bone of hip, left 10/30/2018    Coronary artery disease     DM w/o complication type II 8/9/2013     NSTEMI (non-ST elevated myocardial infarction) 01/2013       Past Surgical History:   Procedure Laterality Date    CARDIAC CATHETERIZATION      CORONARY ANGIOPLASTY WITH STENT PLACEMENT  01/2013    [facility]IE RCA and LAD    LUMBAR LAMINECTOMY  2011    TOTAL HIP ARTHROPLASTY Right 12/2011            Time Tracking:     OT Date of Treatment: 01/13/22  OT Start Time: 1116  OT Stop Time: 1147  OT Total Time (min): 31 min    Billable Minutes:Evaluation 8  Self Care/Home Management 10  Therapeutic Activity 13    1/13/2022

## 2022-01-13 NOTE — PT/OT/SLP EVAL
Physical Therapy Evaluation and Discharge Note    Patient Name:  Santiago Lucia   MRN:  [id number]    Recommendations:     Discharge Recommendations:  home   Discharge Equipment Recommendations: none   Barriers to Discharge: None    Assessment:     Santiago Lucia is a 61 y.o. male admitted with a medical diagnosis of Cellulitis of great toe of right foot. Pt educated on R heel WB status and use of R forefoot offloading Darco shoe and ambulated 75 ft with no AD at mod I level. No further IP PT needs, d/c PT.    Recent Surgery: * No surgery found *      Plan:     During this hospitalization, patient does not require further acute PT services.  Please re-consult if situation changes.      Subjective     Chief Complaint: none  Patient/Family Comments/goals: pt reporting no concerns regarding his mobility, reporting feeling he has no PT needs  Pain/Comfort:  · Pain Rating 1: 0/10  · Pain Rating Post-Intervention 1: 0/10    Patients cultural, spiritual, Pentecostal conflicts given the current situation: no    Living Environment:  Pt lives with his wife in a 2 story house with threshold entrance and all pt's needs on the 1st level. Pt has a WIS.  Prior to admission, patients level of function was independent without AD for mobility and ADLs, drives, and works as a salesman.  Equipment used at home: cane, straight.  DME owned (not currently used): none.  Upon discharge, patient will have assistance from his wife.    Objective:     Communicated with nurse Martin prior to session.  Patient found supine with   upon PT entry to room.    General Precautions: Standard,     Orthopedic Precautions: (R heel WB)   Braces:  (forefoot offloading Darco shoe)   Respiratory Status: Room air    Exams:  · Gross Motor Coordination:  WFL  · Postural Exam:  Patient presented with the following abnormalities:    · -       Rounded shoulders  · Skin Integrity/Edema:      · -       Skin integrity: R foot dressing in place - great toe  cellulitis  · BLE ROM: not formally assessed but WFLs per mobility status  · BLE Strength: not formally assessed but WFLs per mobility status    Functional Mobility:  · Bed Mobility:     · Scooting: independence  · Supine to Sit: independence  · Sit to Supine: independence  · Transfers:     · Sit to Stand:  independence with no AD  · Gait: 75 ft with no AD at mod I level - R forefoot offloading Darco shoe donned and tennis shoe on L foot. Pt ambulates with lateral sway but no instability.    AM-PAC 6 CLICK MOBILITY  Total Score:24       Therapeutic Activities and Exercises:  Educated pt on role of PT and pt agreeable to participate in therapy session.  Pt transitioned to sit EOB, donned his Darco shoe and tennis shoe.  Ambulated as reported above then returned to bed.  Educated on car transfers. Pt reporting no questions/concerns.    AM-PAC 6 CLICK MOBILITY  Total Score:24     Patient left HOB elevated with all lines intact, call button in reach and pt's wife present.    GOALS:   Multidisciplinary Problems     Physical Therapy Goals     Not on file          Multidisciplinary Problems (Resolved)        Problem: Physical Therapy Goal    Goal Priority Disciplines Outcome Goal Variances Interventions   Physical Therapy Goal   (Resolved)     PT, PT/OT Met                     History:     Past Medical History:   Diagnosis Date    Avascular necrosis of bone of hip, left 10/30/2018    Coronary artery disease     DM w/o complication type II 8/9/2013    NSTEMI (non-ST elevated myocardial infarction) 01/2013       Past Surgical History:   Procedure Laterality Date    CARDIAC CATHETERIZATION      CORONARY ANGIOPLASTY WITH STENT PLACEMENT  01/2013    [facility] RCA and LAD    LUMBAR LAMINECTOMY  2011    TOTAL HIP ARTHROPLASTY Right 12/2011            Time Tracking:     PT Received On: 01/13/22  PT Start Time: 1436     PT Stop Time: 1446  PT Total Time (min): 10 min     Billable Minutes: Evaluation 10      01/13/2022

## 2022-01-13 NOTE — CONSULTS
General Infectious Diseases: Consult Note    Consult Requested By: Arlene Kurtz MD    Reason for Consult: toe osteomyelitis/abscess      IMPRESSION:  60yo man w/a history of CAD (prior NSTEMI s/p PCI 2013), HTN, DM2 (A1c=%), PVD, and gout who was admitted on 1/10/2022 with 3 days of progressively worsening pain, redness, and swelling of his R great toe and distal forefoot due to cellulitis with underlying toe osteomyelitis (equivocal findings in great toe distal phalanx per MRI; s/p drainage of a dorsal toe abscess with 20cc purulence cultured). He is stable on empiric coverage.    - would continue empiric vancomycin (dosed per CrCl per pharmacy), cefepime 2g IV q12h, and metronidazole 500mg PO q8h for toe abscess  - await pending bone biopsy/culture to confirm osteomyelitis  - would consider arterial doppler to reassess bloodflow in setting of PVD  - will follow-up pending cultures with you to tailor therapy    Thanks for the consult. ID will follow the patient with you.    Felicita Muller MD  ID Attending  [phone]    SUBJECTIVE:     History of Present Illness:  Mr. Lucia is a 60yo man w/a history of CAD (prior NSTEMI s/p PCI 2013), HTN, DM2 (A1c=6%, diet controlled), PVD, and gout who was admitted on 1/10/2022 with 3 days of progressively worsening pain, redness, and swelling of his R great toe and distal forefoot due to cellulitis with underlying toe osteomyelitis (equivocal findings in great toe distal phalanx per MRI). He was afebrile without a leukocytosis on admission and started on empiric vanc, cefepime, and metronidazole. He underwent drainage of a dorsal toe abscess (20cc purulence cultured) by podiatry yesterday and ID has been consulted to comment on antimicrobial therapy. He denies systemic symptoms including fevers, chills, sweats, or nausea and notes feeling improved since admission.       Past Medical History:  Past Medical History:   Diagnosis Date    Avascular necrosis of bone of  "hip, left 10/30/2018    Coronary artery disease     DM w/o complication type II 2013    NSTEMI (non-ST elevated myocardial infarction) 2013       Past Surgical History:  Past Surgical History:   Procedure Laterality Date    CARDIAC CATHETERIZATION      CORONARY ANGIOPLASTY WITH STENT PLACEMENT  2013    [facility] RCA and LAD    LUMBAR LAMINECTOMY  2011    TOTAL HIP ARTHROPLASTY Right 2011            Family History:  No family history on file.    Social History:  Social History     Tobacco Use    Smoking status: Former Smoker     Packs/day: 0.50     Years: 40.00     Pack years: 20.00     Types: Cigarettes     Quit date: 10/8/2019     Years since quittin.2    Smokeless tobacco: Never Used   Substance Use Topics    Alcohol use: Yes     Comment: social    Drug use: No       Allergies:  Review of patient's allergies indicates:  No Known Allergies     Pertinent Medications:  Antibiotics (From admission, onward)            Start     Stop Route Frequency Ordered    22 0900  vancomycin 1.25 g in dextrose 5% 250 mL IVPB (ready to mix)         -- IV Every 12 hours (non-standard times) 22 0740    22 1400  metroNIDAZOLE tablet 500 mg         -- Oral Every 8 hours 22 1244    22 1400  cefepime in dextrose 5 % 1 gram/50 mL IVPB 1 g         -- IV Every 8 hours (non-standard times) 22 1259    22 0401  vancomycin - pharmacy to dose  (vancomycin IVPB)        "And" Linked Group Details    -- IV pharmacy to manage frequency 22 0302            Review of Symptoms:  Constitutional: Denies fevers, weight loss, chills, or weakness.  Eyes: Denies changes in vision.  ENT: Denies dysphagia, nasal discharge, ear pain or discharge.  Cardiovascular: Denies chest pain, palpitations, orthopnea, or claudication.  Respiratory: Denies shortness of breath, cough, hemoptysis, or wheezing.  GI: Denies nausea/vomiting, hematochezia, melena, abd pain, or changes in appetite.  : Denies " dysuria, incontinence, or hematuria.  Musculoskeletal: Denies joint pain or myalgias.  Skin/breast: Per HPI.  Neurologic: Denies headache, dizziness, vertigo, or paresthesias.  Psychiatric: Denies changes in mood or hallucinations.  Endocrine: Denies polyuria, polydipsia, heat/cold intolerance.  Hematologic/Lymph: Denies lymphadenopathy, easy bruising or easy bleeding.  Allergic/Immunologic: Denies rash, rhinitis.     OBJECTIVE:     Vital Signs (Most Recent)  Temp: 97.4 °F (36.3 °C) (01/13/22 1157)  Pulse: 86 (01/13/22 1200)  Resp: 18 (01/13/22 1342)  BP: (!) 148/92 (01/13/22 1157)  SpO2: 97 % (01/13/22 1157)    Temperature Range Min/Max (Last 24H):  Temp:  [97.4 °F (36.3 °C)-98.4 °F (36.9 °C)]     Physical Exam:  Gen: Nontoxic, comfortable, no acute distress.    HEENT: PERRL. No scleral icterus. EOMI intact. No sinus tenderness. OP clear.  Pulmonary: Non labored. Clear to auscultation A/P/L. No wheezing, crackles, or rhonchi.   Cardiac: Normal S1 & S2 w/o rubs/murmurs/gallops.   Abdomen: Normal bowel sounds. Soft, nontender, nondistended. No rebound or guarding. No hepatosplenomegaly.  Extremities: No clubbing, cyanosis, or peripheral edema. Distal pulses symmetric..  Lymphatics: no preauricular, cervical, supraclavicular, or axillary LAD.  Musculoskeletal: no joint deformities or effusions.  Neurological:  Alert and oriented.  CN II-XII grossly intact. Gross motor intact x4. Sensation grossly intact.  Skin: Foot bandaged by podiatry. Photographs of wound fully reviewed per Dr. Calderon's note.      Laboratory:  CBC:   Lab Results   Component Value Date    WBC 11.96 01/13/2022    HGB 12.7 (L) 01/13/2022    HCT 39.1 (L) 01/13/2022    MCV 87 01/13/2022     01/13/2022       BMP:   Recent Labs   Lab 01/13/22  0541         K 4.4      CO2 26   BUN 24*   CREATININE 0.8   CALCIUM 9.1   MG 2.0       LFT:   Lab Results   Component Value Date    ALT 36 01/10/2022    AST 22 01/10/2022    ALKPHOS 82  01/10/2022    BILITOT 0.4 01/10/2022       Microbiology:  1/11 blood cx NGTD  1/11 abscess cx pending  1/13 bone biopsy cx pending      Diagnostic Results:  MRI foot:   1. Nonspecific edema-like signal involving the great toe the distal phalanx.  Given the adjacent soft tissue swelling and suggested soft tissue defect, this could relate to acute osteomyelitis.  Nonspecific reactive marrow edema could appear similar.  Recommend clinical correlation with consideration of biopsy for more definitive characterization.   2. No acute appearing marrow changes noted elsewhere.  Additional details, as provided in the body of report.     ASSESSMENT/PLAN:     Active Hospital Problems    Diagnosis  POA    *Cellulitis of great toe of right foot [L03.031]  Yes    Type 2 diabetes mellitus with diabetic peripheral angiopathy without gangrene [E11.51]  Yes    PVD (peripheral vascular disease) [I73.9]  Yes    HTN (hypertension) [I10]  Yes    Dyslipidemia [E78.5]  Yes    CAD (coronary artery disease) [I25.10]  Yes      Resolved Hospital Problems   No resolved problems to display.       Plan: Please see top of page

## 2022-01-13 NOTE — PLAN OF CARE
Problem: Physical Therapy Goal  Goal: Physical Therapy Goal  Outcome: Met     Pt educated on R heel WB status and use of R forefoot offloading Darco shoe and ambulated 75 ft with no AD at mod I level. No further IP PT needs, d/c PT.

## 2022-01-13 NOTE — PROGRESS NOTES
Pharmacokinetic Assessment Follow Up: IV Vancomycin    Vancomycin serum concentration assessment(s):    The trough level was drawn correctly and can be used to guide therapy at this time. The measurement is within the desired definitive target range of 15 to 20 mcg/mL.    Vancomycin Regimen Plan:    Per MD note, patient likely with acute osteomyelitis. Will continue vancomycin 1250 mg IV q 12 hours and recheck vancomycin random on 1/14 @ 0800     Drug levels (last 3 results):  Recent Labs   Lab Result Units 01/12/22  1118   Vancomycin-Trough ug/mL 18.9       Pharmacy will continue to follow and monitor vancomycin.    Please contact pharmacy at extension [phone] for questions regarding this assessment.    Thank you for the consult,   Brittany Cordova       Patient brief summary:  Santiago Lucia is a 61 y.o. male initiated on antimicrobial therapy with IV Vancomycin for treatment of bone/joint infection    The patient's current regimen is vancomycin 1250 mg IV q 12 hours    Drug Allergies:   Review of patient's allergies indicates:  No Known Allergies    Actual Body Weight:   80 kg    Renal Function:   Estimated Creatinine Clearance: 109.7 mL/min (based on SCr of 0.8 mg/dL).,     Dialysis Method (if applicable):  N/A    CBC (last 72 hours):  Recent Labs   Lab Result Units 01/10/22  2114 01/11/22  0309 01/12/22  0425 01/13/22  0541   WBC K/uL 16.89* 13.66* 11.38 11.96   Hemoglobin g/dL 14.2 12.5* 12.5* 12.7*   Hemoglobin A1C %  --  6.0*  --   --    Hematocrit % 43.6 38.8* 39.7* 39.1*   Platelets K/uL 198 181 203 206   Gran % % 73.3* 67.2 63.4 60.0   Lymph % % 15.0* 20.5 22.5 25.3   Mono % % 9.4 9.3 10.3 11.3   Eosinophil % % 1.5 1.8 2.3 2.0   Basophil % % 0.4 0.5 0.9 0.7   Differential Method  Automated Automated Automated Automated       Metabolic Panel (last 72 hours):  Recent Labs   Lab Result Units 01/10/22  2114 01/11/22  0309 01/12/22  0425 01/13/22  0541   Sodium mmol/L 141 138 136 138   Potassium mmol/L 4.1  4.0 4.3 4.4   Chloride mmol/L 103 104 104 105   CO2 mmol/L 25 24 21* 26   Glucose mg/dL 106 125* 103 101   BUN mg/dL 19 21 21 24*   Creatinine mg/dL 1.1 0.9 0.9 0.8   Albumin g/dL 4.1  --   --   --    Total Bilirubin mg/dL 0.4  --   --   --    Alkaline Phosphatase U/L 82  --   --   --    AST U/L 22  --   --   --    ALT U/L 36  --   --   --    Magnesium mg/dL  --  2.2 2.1 2.0       Vancomycin Administrations:  vancomycin given in the last 96 hours                     vancomycin 1.25 g in dextrose 5% 250 mL IVPB (ready to mix) (mg) 1,250 mg New Bag 01/12/22 1417     1,250 mg New Bag  0024      Restarted 01/11/22 1541     1,250 mg New Bag  1432    vancomycin (VANCOCIN) 2,250 mg in dextrose 5 % 500 mL IVPB (mg) 2,250 mg New Bag 01/11/22 0031                    Microbiologic Results:  Microbiology Results (last 7 days)       Procedure Component Value Units Date/Time    Blood culture #2 **CANNOT BE ORDERED STAT** [[id number]] Collected: 01/11/22 0005    Order Status: Completed Specimen: Blood Updated: 01/12/22 1022     Blood Culture, Routine No Growth to date      No Growth to date    Blood culture #1 **CANNOT BE ORDERED STAT** [[id number]] Collected: 01/11/22 0005    Order Status: Completed Specimen: Blood Updated: 01/12/22 1022     Blood Culture, Routine No Growth to date      No Growth to date    Culture, Anaerobe [[id number]] Collected: 01/11/22 0823    Order Status: Completed Specimen: Abscess from Foot, Right Updated: 01/12/22 0718     Anaerobic Culture Culture in progress    Aerobic culture [[id number]] Collected: 01/11/22 0823    Order Status: Completed Specimen: Abscess from Foot, Right Updated: 01/12/22 0715     Aerobic Bacterial Culture No growth

## 2022-01-13 NOTE — NURSING
Pt AAOx4, ambulating in room with sba of wife with heel touch only, right foot/toe dressing changed by MD today and is CDI, pt reports pain is tolerable but still will like pain meds to stay on top of pain management. Pt did take shower with assistance of wife at the bedside. Call light is left within reach and pt knows to call for assistance when needed.

## 2022-01-13 NOTE — MEDICAL/APP STUDENT
[facility] Consult Note    Primary Attending Physician: Arlene Kurtz MD  Consultant Attending: Felicita Muller MD   Consultant Fellow: N/A    Assessment/Plan:     Cellulitis of great toe of right foot with possible osteomyelitis  -XRAY noted overlying cellulitis  -MRI noted likely acute osteomyelitis  -continue vancomycin/cefepime/metronidazole  -pending BCx2  -pending tissue culture data to tailor ABx    Thank you for allowing us to participate in the care of this patient. Please contact me if you have any questions regarding this consult.    Genesis Daniels MS3  [facility]    Reason for Consult:     Cellulitis of great toe of right foot    Subjective:      History of Present Illness:  Santiago Lucia is a 60 yo M w a omhx of CAD, NSTEMI, HTN, PVD, DM, gout, and chronic foot ulcer and callous. He presents w worsening pain and swelling of R big toe for 3 days that has spread to his foot. He has been seeing Dr Stevenson with podiatry for chronic issues with the toe including callus formation and ulcers. He denies drainage and reports hx of gout w 3 attacks in the past the most recent being 6-8 yrs ago. He reports a similar episode of toe and foot swelling, pain, and redness that occurred about 3 yrs ago. He denies having diabetes and says he does not take anything for it.     Upon arrival WBC 16 in ED, XRAY showed cellulitis and MRI suggestive of osteomyelitis. Foot is warm, erythematous, edematous, painful and tender. Pt denies N/V/F/C and has no other complaints. Pt reports pain and swelling from his right big toe throughout his R foot.     Past Medical History:  Past Medical History:   Diagnosis Date    Avascular necrosis of bone of hip, left 10/30/2018    Coronary artery disease     DM w/o complication type II 8/9/2013    NSTEMI (non-ST elevated myocardial infarction) 01/2013       Past Surgical History:  Past Surgical History:   Procedure Laterality Date    CARDIAC  CATHETERIZATION      CORONARY ANGIOPLASTY WITH STENT PLACEMENT  2013    [facility] RCA and LAD    LUMBAR LAMINECTOMY  2011    TOTAL HIP ARTHROPLASTY Right 2011            Allergies:  Review of patient's allergies indicates:  No Known Allergies    Medications:   Home Medications:  Prior to Admission medications    Medication Sig Start Date End Date Taking? Authorizing Provider   ascorbic acid, vitamin C, (VITAMIN C) 500 MG tablet Take 500 mg by mouth once daily.   Yes Historical Provider   aspirin (ECOTRIN) 81 MG EC tablet Take 1 tablet (81 mg total) by mouth once daily. 10/1/20 10/5/21 Yes Mert Velasquez MD   COLLAGEN MISC Take 1 tablet by mouth once daily.   Yes Historical Provider   diclofenac (VOLTAREN) 75 MG EC tablet Take 1 tablet (75 mg total) by mouth 2 (two) times daily. 10/5/21  Yes Yon Asif MD   HYDROcodone-acetaminophen (NORCO) 7.5-325 mg per tablet Take 1 tablet by mouth every 12 (twelve) hours as needed for Pain. 10/26/21  Yes Yon Asif MD   magnesium oxide (MAG-OX) 400 mg (241.3 mg magnesium) tablet Take 400 mg by mouth once daily.   Yes Historical Provider   metoprolol succinate (TOPROL-XL) 25 MG 24 hr tablet TAKE 1 TABLET(25 MG) BY MOUTH EVERY DAY 10/31/21  Yes Yon Asif MD   rosuvastatin (CRESTOR) 10 MG tablet TAKE 1 TABLET(10 MG) BY MOUTH EVERY DAY 10/30/21  Yes Mert Velasquez MD   tadalafiL (CIALIS) 20 MG Tab Take 1 tablet (20 mg total) by mouth as needed. 21  Yes Yon Asif MD   vitamin D (VITAMIN D3) 1000 units Tab Take 1,000 Units by mouth once daily.   Yes Historical Provider   zinc sulfate (ZINCATE) 50 mg zinc (220 mg) capsule Take 220 mg by mouth once daily.   Yes Historical Provider       Family History:  No family history on file.    Social History:  Social History     Tobacco Use    Smoking status: Former Smoker     Packs/day: 0.50     Years: 40.00     Pack years: 20.00     Types: Cigarettes     Quit date: 10/8/2019     Years since quittin.2     Smokeless tobacco: Never Used   Substance Use Topics    Alcohol use: Yes     Comment: social    Drug use: No       Review of Systems   Constitutional: Negative for chills, fever and malaise/fatigue.   HENT: Negative for sore throat.    Respiratory: Negative for cough and shortness of breath.    Cardiovascular: Negative for chest pain, claudication and leg swelling.   Gastrointestinal: Negative for abdominal pain, diarrhea, nausea and vomiting.   Genitourinary: Negative.    Musculoskeletal: Negative for joint pain.   Skin: Negative.    Neurological: Negative for weakness and headaches.   Endo/Heme/Allergies: Negative.    Psychiatric/Behavioral: Negative.           Objective:   Last 24 Hour Vital Signs:  BP  Min: 131/74  Max: 143/81  Temp  Av.1 °F (36.7 °C)  Min: 97.6 °F (36.4 °C)  Max: 98.4 °F (36.9 °C)  Pulse  Av  Min: 68  Max: 84  Resp  Av.2  Min: 18  Max: 19  SpO2  Av %  Min: 96 %  Max: 99 %  Weight  Av kg (176 lb 5.9 oz)  Min: 80 kg (176 lb 5.9 oz)  Max: 80 kg (176 lb 5.9 oz)  I/O last 3 completed shifts:  In: 1264.7 [P.O.:720; IV Piggyback:544.7]  Out: 1400 [Urine:1400]    Physical Exam  Constitutional:       Appearance: Normal appearance.   HENT:      Head: Normocephalic and atraumatic.      Nose: Nose normal.   Eyes:      Extraocular Movements: Extraocular movements intact.      Conjunctiva/sclera: Conjunctivae normal.   Cardiovascular:      Rate and Rhythm: Normal rate and regular rhythm.      Pulses: Normal pulses.      Heart sounds: Normal heart sounds.   Pulmonary:      Effort: Pulmonary effort is normal.      Breath sounds: Normal breath sounds.   Abdominal:      Palpations: Abdomen is soft.   Musculoskeletal:         General: Swelling and tenderness present. Normal range of motion.   Skin:     General: Skin is warm.      Capillary Refill: Capillary refill takes less than 2 seconds.      Findings: Erythema present.   Neurological:      Mental Status: He is alert.    Psychiatric:         Mood and Affect: Mood normal.         Laboratory Results:  Most Recent Data:  CBC:   Lab Results   Component Value Date    WBC 11.96 01/13/2022    HGB 12.7 (L) 01/13/2022    HCT 39.1 (L) 01/13/2022     01/13/2022    MCV 87 01/13/2022    RDW 14.0 01/13/2022     BMP:   Lab Results   Component Value Date     01/13/2022    K 4.4 01/13/2022     01/13/2022    CO2 26 01/13/2022    BUN 24 (H) 01/13/2022     01/13/2022    CALCIUM 9.1 01/13/2022    MG 2.0 01/13/2022     LFTs:   Lab Results   Component Value Date    PROT 7.7 01/10/2022    ALBUMIN 4.1 01/10/2022    BILITOT 0.4 01/10/2022    AST 22 01/10/2022    ALKPHOS 82 01/10/2022    ALT 36 01/10/2022     Coags:   Lab Results   Component Value Date    INR 0.9 01/11/2022     FLP:   Lab Results   Component Value Date    CHOL 147 10/05/2021    HDL 44 10/05/2021    LDLCALC 78.6 10/05/2021    TRIG 122 10/05/2021    CHOLHDL 29.9 10/05/2021     DM:   Lab Results   Component Value Date    HGBA1C 6.0 (H) 01/11/2022    HGBA1C 6.0 (H) 07/30/2021    HGBA1C 7.1 (H) 01/16/2013    LDLCALC 78.6 10/05/2021    CREATININE 0.8 01/13/2022     Thyroid: No results found for: TSH, FREET4, Z3JUQFL, V2VVLUP, THYROIDAB  Anemia:   Lab Results   Component Value Date    IRON 12 (L) 11/20/2011    TIBC 269 11/20/2011    FERRITIN 424 (H) 11/20/2011     Cardiac:   Lab Results   Component Value Date    TROPONINI 0.196 (H) 01/17/2013    BNP 38 01/15/2013     Urinalysis:   Lab Results   Component Value Date    COLORU Yellow 11/07/2011    SPECGRAV 1.025 11/07/2011    NITRITE Negative 11/07/2011    KETONESU Negative 11/07/2011       Trended Lab Data:  Recent Labs   Lab 01/10/22  2114 01/10/22  2114 01/11/22  0309 01/12/22  0425 01/13/22  0541   WBC 16.89*   < > 13.66* 11.38 11.96   HGB 14.2   < > 12.5* 12.5* 12.7*   HCT 43.6   < > 38.8* 39.7* 39.1*      < > 181 203 206   MCV 88   < > 89 89 87   RDW 14.4   < > 14.5 14.5 14.0      < > 138 136 138   K 4.1   " < > 4.0 4.3 4.4      < > 104 104 105   CO2 25   < > 24 21* 26   BUN 19   < > 21 21 24*      < > 125* 103 101   PROT 7.7  --   --   --   --    ALBUMIN 4.1  --   --   --   --    BILITOT 0.4  --   --   --   --    AST 22  --   --   --   --    ALKPHOS 82  --   --   --   --    ALT 36  --   --   --   --     < > = values in this interval not displayed.         Microbiology Data:  Microbiology Results (last 7 days)     Procedure Component Value Units Date/Time    Culture, Anaerobe [[id number]] Collected: 01/11/22 0823    Order Status: Completed Specimen: Abscess from Foot, Right Updated: 01/13/22 1125     Anaerobic Culture Culture in progress    Blood culture #1 **CANNOT BE ORDERED STAT** [[id number]] Collected: 01/11/22 0005    Order Status: Completed Specimen: Blood Updated: 01/13/22 1022     Blood Culture, Routine No Growth to date      No Growth to date      No Growth to date    Blood culture #2 **CANNOT BE ORDERED STAT** [[id number]] Collected: 01/11/22 0005    Order Status: Completed Specimen: Blood Updated: 01/13/22 1022     Blood Culture, Routine No Growth to date      No Growth to date      No Growth to date    Aerobic culture [[id number]] Collected: 01/11/22 0823    Order Status: Completed Specimen: Abscess from Foot, Right Updated: 01/12/22 0715     Aerobic Bacterial Culture No growth            Antimicrobials:  Antibiotics (From admission, onward)            Start     Stop Route Frequency Ordered    01/13/22 0900  vancomycin 1.25 g in dextrose 5% 250 mL IVPB (ready to mix)         -- IV Every 12 hours (non-standard times) 01/13/22 0740    01/11/22 1400  metroNIDAZOLE tablet 500 mg         -- Oral Every 8 hours 01/11/22 1244    01/11/22 1400  cefepime in dextrose 5 % 1 gram/50 mL IVPB 1 g         -- IV Every 8 hours (non-standard times) 01/11/22 1259    01/11/22 0401  vancomycin - pharmacy to dose  (vancomycin IVPB)        "And" Linked Group Details    -- IV pharmacy to manage frequency 01/11/22 0309    "         Other Results:    Radiology:  X-Ray Toe 2 or More Views Right    Result Date: 1/10/2022  EXAMINATION: XR TOE 2 OR MORE VIEWS RIGHT CLINICAL HISTORY: r/o osteo 1st digit; TECHNIQUE: Two views of the right great toe were performed. COMPARISON: 07/30/2021. FINDINGS: The bone mineralization is within normal limits.  There is no cortical step-off.  There is no evidence of periostitis. There is expected joint space narrowing.  There is marked soft tissue swelling involving the right great toe.  No radiopaque foreign body is identified.     No cortical erosions to suggest advanced osteomyelitis.  Additional evaluation, as clinically warranted. Diffuse soft tissues of the right great toe, suggestive of overlying cellulitis. Electronically signed by: Db Pena MD Date:    01/10/2022 Time:    22:43    MRI Foot (Forefoot) Right Without Contrast    Result Date: 1/11/2022  EXAMINATION: MRI FOOT (FOREFOOT) RIGHT WITHOUT CONTRAST CLINICAL HISTORY: Foot swelling, diabetic, osteomyelitis suspected, xray done; TECHNIQUE: Multiplanar, multisequence MR imaging of the foot centered at the forefoot was performed without contrast. COMPARISON: Radiograph of the right great toe performed 01/10/2022. FINDINGS: Correlating with findings on prior radiograph performed 01/10/2022, 22:15 hours, there is diffuse soft tissue swelling about the great toe, with ill-defined edema-like signal, most pronounced at the medial aspect of the distal aspect of the great toe about the distal phalanx.  Suggested soft tissue defect at the dorsal aspect of the great toe mass centered at the level of the interphalangeal joint and distal phalanx. Additionally, there is ill-defined edema-like signal involving the great toe distal phalanx (sagittal stir series 9, image 8; short axis STIR series 5, image 37).  No definite well corticated erosive changes suggested.  Small effusion is suggested about the great toe metatarsophalangeal joint No definite acute  marrow findings are suggested elsewhere.  No evidence of acute fracture or dislocation is seen.  Degenerative changes are again noted at the great toe interphalangeal joint. Diffuse degree of soft tissue swelling is noted elsewhere.     1. Nonspecific edema-like signal involving the great toe the distal phalanx.  Given the adjacent soft tissue swelling and suggested soft tissue defect, this could relate to acute osteomyelitis.  Nonspecific reactive marrow edema could appear similar.  Recommend clinical correlation with consideration of biopsy for more definitive characterization. 2. No acute appearing marrow changes noted elsewhere.  Additional details, as provided in the body of report. Electronically signed by: Wally Shook Date:    01/11/2022 Time:    18:07

## 2022-01-13 NOTE — PLAN OF CARE
Pt AAOx3, ambulating in room with sba of wife with heel touch only, bp slightly elevated and losartan started today, right foot/toe dressing changed by MD today, pt reports pain is tolerable today, pt asking to take a shower.

## 2022-01-13 NOTE — PLAN OF CARE
[facility] following for possible home IVABX needs at d/c.  Will await final culture results with ID recs once more definitive plan made. Pt remains on empiric therapy at this time.    [facility]  Ashley Neil RN Clinical Liaison  Cell: [phone]  Office: [phone]   Fax:  [phone]

## 2022-01-13 NOTE — CONSULTS
Thank you for your consult to [facility]. We have reviewed the patient chart. This patient does meet criteria for [facility] service at this time. Will assume care on 01/13/22 at 7AM     Arlene Kurtz MD  [facility]

## 2022-01-14 LAB
BACTERIA SPEC AEROBE CULT: NO GROWTH
POCT GLUCOSE: 134 MG/DL (ref 70–110)
POCT GLUCOSE: 136 MG/DL (ref 70–110)
VANCOMYCIN TROUGH SERPL-MCNC: 13.9 UG/ML (ref 10–22)

## 2022-01-14 PROCEDURE — 25000003 PHARM REV CODE 250: Performed by: NURSE PRACTITIONER

## 2022-01-14 PROCEDURE — 21400001 HC TELEMETRY ROOM

## 2022-01-14 PROCEDURE — 25000003 PHARM REV CODE 250: Performed by: HOSPITALIST

## 2022-01-14 PROCEDURE — 63600175 PHARM REV CODE 636 W HCPCS: Performed by: NURSE PRACTITIONER

## 2022-01-14 PROCEDURE — 63600175 PHARM REV CODE 636 W HCPCS: Performed by: HOSPITALIST

## 2022-01-14 PROCEDURE — 36415 COLL VENOUS BLD VENIPUNCTURE: CPT | Performed by: STUDENT IN AN ORGANIZED HEALTH CARE EDUCATION/TRAINING PROGRAM

## 2022-01-14 PROCEDURE — 25000003 PHARM REV CODE 250: Performed by: STUDENT IN AN ORGANIZED HEALTH CARE EDUCATION/TRAINING PROGRAM

## 2022-01-14 PROCEDURE — 80202 ASSAY OF VANCOMYCIN: CPT | Performed by: STUDENT IN AN ORGANIZED HEALTH CARE EDUCATION/TRAINING PROGRAM

## 2022-01-14 PROCEDURE — 63600175 PHARM REV CODE 636 W HCPCS: Performed by: STUDENT IN AN ORGANIZED HEALTH CARE EDUCATION/TRAINING PROGRAM

## 2022-01-14 RX ADMIN — HYDROCODONE BITARTRATE AND ACETAMINOPHEN 1 TABLET: 7.5; 325 TABLET ORAL at 10:01

## 2022-01-14 RX ADMIN — CEFEPIME HYDROCHLORIDE 1 G: 1 INJECTION, SOLUTION INTRAVENOUS at 05:01

## 2022-01-14 RX ADMIN — HYDROCODONE BITARTRATE AND ACETAMINOPHEN 1 TABLET: 7.5; 325 TABLET ORAL at 07:01

## 2022-01-14 RX ADMIN — CEFEPIME HYDROCHLORIDE 1 G: 1 INJECTION, SOLUTION INTRAVENOUS at 02:01

## 2022-01-14 RX ADMIN — VANCOMYCIN HYDROCHLORIDE 1250 MG: 1.25 INJECTION, POWDER, LYOPHILIZED, FOR SOLUTION INTRAVENOUS at 08:01

## 2022-01-14 RX ADMIN — METRONIDAZOLE 500 MG: 500 TABLET ORAL at 05:01

## 2022-01-14 RX ADMIN — VANCOMYCIN HYDROCHLORIDE 1500 MG: 1.5 INJECTION, POWDER, LYOPHILIZED, FOR SOLUTION INTRAVENOUS at 11:01

## 2022-01-14 RX ADMIN — METOPROLOL SUCCINATE 25 MG: 25 TABLET, EXTENDED RELEASE ORAL at 08:01

## 2022-01-14 RX ADMIN — METRONIDAZOLE 500 MG: 500 TABLET ORAL at 02:01

## 2022-01-14 RX ADMIN — ATORVASTATIN CALCIUM 40 MG: 40 TABLET, FILM COATED ORAL at 08:01

## 2022-01-14 RX ADMIN — LOSARTAN POTASSIUM 25 MG: 25 TABLET, FILM COATED ORAL at 08:01

## 2022-01-14 RX ADMIN — ASPIRIN 81 MG: 81 TABLET, COATED ORAL at 08:01

## 2022-01-14 RX ADMIN — ENOXAPARIN SODIUM 40 MG: 100 INJECTION SUBCUTANEOUS at 04:01

## 2022-01-14 RX ADMIN — CEFEPIME HYDROCHLORIDE 1 G: 1 INJECTION, SOLUTION INTRAVENOUS at 09:01

## 2022-01-14 RX ADMIN — METRONIDAZOLE 500 MG: 500 TABLET ORAL at 09:01

## 2022-01-14 NOTE — ASSESSMENT & PLAN NOTE
-MRI reviewed again with patient and his wife. They elect for bone biopsy and treatment with 6 weeks of antibiotics for osteomyelitis.   -Bedside debridement with bone biopsy performed today, see procedure note. Patient tolerated well  -Site dressed with surgicel and toe ball of gauze and coban for compression and with protective football.   -He is WBAT in forefoot offloading Darco shoe  -Antibiotics per Infectious Disease recommendations. Recommend 6 weeks for osteomyelitis.   -Rest, elevate. Strict offloading to wound site.   -Football dressing applied today, can stay intact for up to 1 week.  -Upon D/C, HH to change dresings 2x per week with saline moistened hydrafera, 4x4, cast padding rolls x2 and light coban.  Pt will need to follow up with podiatry within 1-2 weeks of D/C  -Podiatry will follow

## 2022-01-14 NOTE — PLAN OF CARE
LAUREN messaged pt's medical team regarding updates for discharge planning. MD states that  likely he could discharge tomorrow or following day. If all cultures remain negative, his antibiotics can likely be simplified by Dr. Monterroso who has taken over the service.    LAUREN connected with DARION Ramirez at [facility] to follow pt's course today and tomorrow.        01/14/22 1042   Post-Acute Status   Post-Acute Authorization Home Health;IV Infusion   IV Infusion Status Post acute provider reviewing for acceptance

## 2022-01-14 NOTE — PROGRESS NOTES
[facility] Medicine  Telemedicine Progress Note    Patient Name: Santiago Lucia  MRN: [id number]  Patient Class: IP- Inpatient   Admission Date: 1/10/2022  Length of Stay: 1 days  Attending Physician: Arlene Kurtz MD  Primary Care Provider: Yon Asif MD          Subjective:     Principal Problem:Cellulitis of great toe of right foot        HPI:  Santiago Lucia is a 62 yo male with a pmh of CAD, NSTEMI, HTN, PVD, gout. He presented with worsening pain and swelling of right great toe x 3 days. Today he noted his toe was throbbing and his entire right foot had swollen. He has been seeing Dr Stevenson with podiatry for chronic issues with the toe including callus formations. He denies drainage to the toe. He reports history of gout with 3 attacks in the past. He denies having diabetes and does not take any meds for this. No chills or fevers, no nausea. No other complaints.     In the ED, he was noted to be tachycardic and had WBC 16. Foot XRAY with cellulitis.       Overview/Hospital Course:  No notes on file    Interval History:  Patient still with pain, PRN meds ordered. ID consulted for abx recs, now on IV vanc, cefepime, and falgyl for presumed OM.    Review of Systems   Constitutional: Negative for fever.   Respiratory: Negative for shortness of breath.    Skin: Positive for rash and wound.   Neurological: Positive for numbness. Negative for dizziness and light-headedness.     Objective:     Vital Signs (Most Recent):  Temp: 98.6 °F (37 °C) (01/13/22 1924)  Pulse: 86 (01/13/22 1924)  Resp: 16 (01/13/22 2126)  BP: (!) 141/72 (01/13/22 1924)  SpO2: 97 % (01/13/22 1924) Vital Signs (24h Range):  Temp:  [97.4 °F (36.3 °C)-98.6 °F (37 °C)] 98.6 °F (37 °C)  Pulse:  [68-86] 86  Resp:  [16-18] 16  SpO2:  [97 %-99 %] 97 %  BP: (131-148)/(72-92) 141/72     Weight: 80 kg (176 lb 5.9 oz)  Body mass index is 22.04 kg/m².    Intake/Output Summary (Last 24 hours) at 1/13/2022 7808  Last data  filed at 1/13/2022 1800  Gross per 24 hour   Intake 360 ml   Output 600 ml   Net -240 ml      Physical Exam  Vitals and nursing note reviewed.   Constitutional:       General: He is not in acute distress.     Appearance: Normal appearance. He is not toxic-appearing.   HENT:      Head: Normocephalic and atraumatic.      Nose: Nose normal.      Mouth/Throat:      Mouth: Mucous membranes are moist.   Eyes:      Pupils: Pupils are equal, round, and reactive to light.   Cardiovascular:      Rate and Rhythm: Normal rate and regular rhythm.      Pulses: Normal pulses.      Heart sounds: Normal heart sounds.   Pulmonary:      Effort: Pulmonary effort is normal.      Breath sounds: Normal breath sounds.   Abdominal:      General: Bowel sounds are normal.      Palpations: Abdomen is soft.   Musculoskeletal:         General: Swelling and tenderness present. Normal range of motion.      Cervical back: Normal range of motion.   Skin:     General: Skin is warm and dry.      Findings: Erythema present.      Comments: Foot wrapped. Did not unwrap   Neurological:      Mental Status: He is alert and oriented to person, place, and time.   Psychiatric:         Behavior: Behavior normal.         Thought Content: Thought content normal.         Significant Labs: All pertinent labs within the past 24 hours have been reviewed.    Significant Imaging: I have reviewed all pertinent imaging results/findings within the past 24 hours.      Assessment/Plan:      * Cellulitis of great toe of right foot  Presents with pain and swelling to right great toe, swelling extends to foot  WBC 16, XRAY with cellulitis  Foot is warm to touch with erythema and edema noted, right great toe with extensive swelling noted, dry calluses under toe.  Given vanc and rocephin in ED  -cont vanc and switch to cefepime/Flagyl  -MRI noted and likely acute osteomyelitis  -Podiatry consulted and appreciate recommendations  -Pending tissue culture data to tailor  antibiotics    Type 2 diabetes mellitus with diabetic peripheral angiopathy without gangrene  Diet controlled  A1C 6.0  accuchecks and SSI      PVD (peripheral vascular disease)  Cont statin      CAD (coronary artery disease)  Cont ASA, statin      Dyslipidemia  Cont statin      HTN (hypertension)  Cont metoprolol  - add Losartan as not controlled today      VTE Risk Mitigation (From admission, onward)         Ordered     enoxaparin injection 40 mg  Daily         01/11/22 0001     IP VTE HIGH RISK PATIENT  Once         01/11/22 0001     Place sequential compression device  Until discontinued         01/11/22 0001                      I have assessed these finding virtually using telemed platform and with assistance of bedside nurse                 The attending portion of this evaluation, treatment, and documentation was performed per Arlene Kurtz MD via Telemedicine AudioVisual using the secure [employer] software platform with 2 way audio/video. The provider was located off-site and the patient is located in the hospital. The aforementioned video software was utilized to document the relevant history and physical exam    Arlene Kurtz MD  Department of [facility] Medicine   [facility]

## 2022-01-14 NOTE — PROGRESS NOTES
[place] [facility]  Podiatry  Progress Note    Patient Name: Santiago Lucia  MRN: [id number]  Admission Date: 1/10/2022  Hospital Length of Stay: 2 days  Attending Physician: Arlene Kurtz MD  Primary Care Provider: Yon Asif MD     Subjective:     Interval History: Pt seen today at bedside to review MRI results and for bone biopsy. He is s/p bedside I&D. No new pedal complaints.         Scheduled Meds:   aspirin  81 mg Oral Daily    atorvastatin  40 mg Oral Daily    ceFEPime (MAXIPIME) IVPB  1 g Intravenous Q8H    enoxaparin  40 mg Subcutaneous Daily    losartan  25 mg Oral Daily    metoprolol succinate  25 mg Oral Daily    metroNIDAZOLE  500 mg Oral Q8H    vancomycin (VANCOCIN) IVPB  1,500 mg Intravenous Q12H     Continuous Infusions:  PRN Meds:acetaminophen, dextrose 50%, dextrose 50%, glucagon (human recombinant), glucose, glucose, HYDROcodone-acetaminophen, influenza, insulin aspart U-100, melatonin, naloxone, ondansetron, sodium chloride 0.9%, Pharmacy to dose Vancomycin consult **AND** vancomycin - pharmacy to dose    Review of Systems   Constitutional: Negative for chills, diaphoresis, fatigue and fever.   HENT: Negative for congestion and hearing loss.    Respiratory: Negative for cough and shortness of breath.    Cardiovascular: Positive for leg swelling. Negative for chest pain.   Gastrointestinal: Negative for nausea and vomiting.   Musculoskeletal: Positive for back pain, gait problem and joint swelling.   Skin: Positive for color change and wound. Negative for pallor and rash.   Neurological: Positive for numbness. Negative for tremors, speech difficulty and weakness.   Psychiatric/Behavioral: Negative for agitation and confusion.     Objective:     Vital Signs (Most Recent):  Temp: 97.7 °F (36.5 °C) (01/14/22 0714)  Pulse: 78 (01/14/22 0800)  Resp: 16 (01/14/22 0714)  BP: 115/71 (01/14/22 0714)  SpO2: 98 % (01/14/22 0714) Vital Signs (24h Range):  Temp:  [97.7 °F (36.5 °C)-98.6 °F  (37 °C)] 97.7 °F (36.5 °C)  Pulse:  [67-94] 78  Resp:  [16-18] 16  SpO2:  [97 %-99 %] 98 %  BP: (115-151)/(70-86) 115/71     Weight: 80 kg (176 lb 5.9 oz)  Body mass index is 22.04 kg/m².    Foot Exam    General  General Appearance: appears stated age and healthy   Orientation: alert and oriented to person, place, and time   Affect: appropriate       Right Foot/Ankle     Inspection and Palpation  Ecchymosis: none  Skin Exam: blister, callus, drainage, cellulitis and ulcer;     Neurovascular  Dorsalis pedis: 1+  Posterior tibial: 1+  Saphenous nerve sensation: diminished  Tibial nerve sensation: diminished  Superficial peroneal nerve sensation: diminished  Deep peroneal nerve sensation: diminished  Sural nerve sensation: diminished      Left Foot/Ankle      Inspection and Palpation  Ecchymosis: none  Skin Exam: skin intact;     Neurovascular  Dorsalis pedis: 1+  Posterior tibial: 1+  Saphenous nerve sensation: diminished  Tibial nerve sensation: diminished  Superficial peroneal nerve sensation: diminished  Deep peroneal nerve sensation: diminished  Sural nerve sensation: diminished            Laboratory:  A1C:   Recent Labs   Lab 07/30/21  1340 01/11/22 0309   HGBA1C 6.0* 6.0*     Blood Cultures: No results for input(s): LABBLOO in the last 48 hours.  CBC:   Recent Labs   Lab 01/13/22  0541   WBC 11.96   RBC 4.49*   HGB 12.7*   HCT 39.1*      MCV 87   MCH 28.3   MCHC 32.5     CMP:   Recent Labs   Lab 01/10/22  2114 01/11/22  0309 01/13/22  0541      < > 101   CALCIUM 9.9   < > 9.1   ALBUMIN 4.1  --   --    PROT 7.7  --   --       < > 138   K 4.1   < > 4.4   CO2 25   < > 26      < > 105   BUN 19   < > 24*   CREATININE 1.1   < > 0.8   ALKPHOS 82  --   --    ALT 36  --   --    AST 22  --   --    BILITOT 0.4  --   --     < > = values in this interval not displayed.     CRP:   Recent Labs   Lab 01/10/22  2114   CRP 64.1*     ESR:   Recent Labs   Lab 01/10/22  2114   SEDRATE 34*     Wound  Cultures:   Recent Labs   Lab 01/11/22 0823 01/13/22 1438   LABAERO No growth No growth     Microbiology Results (last 7 days)     Procedure Component Value Units Date/Time    Blood culture #1 **CANNOT BE ORDERED STAT** [[id number]] Collected: 01/11/22 0005    Order Status: Completed Specimen: Blood Updated: 01/14/22 1022     Blood Culture, Routine No Growth to date      No Growth to date      No Growth to date      No Growth to date    Blood culture #2 **CANNOT BE ORDERED STAT** [[id number]] Collected: 01/11/22 0005    Order Status: Completed Specimen: Blood Updated: 01/14/22 1022     Blood Culture, Routine No Growth to date      No Growth to date      No Growth to date      No Growth to date    Aerobic culture [[id number]] Collected: 01/11/22 0823    Order Status: Completed Specimen: Abscess from Foot, Right Updated: 01/14/22 1019     Aerobic Bacterial Culture No growth    Aerobic culture [[id number]] Collected: 01/13/22 1438    Order Status: Completed Specimen: Bone from Toe, Right Foot Updated: 01/14/22 0715     Aerobic Bacterial Culture No growth    Culture, Anaerobe [[id number]] Collected: 01/13/22 1438    Order Status: Sent Specimen: Bone from Toe, Right Foot Updated: 01/13/22 1904    Culture, Anaerobe [[id number]] Collected: 01/11/22 0823    Order Status: Completed Specimen: Abscess from Foot, Right Updated: 01/13/22 1125     Anaerobic Culture Culture in progress        Specimen (24h ago, onward)            None          Diagnostic Results:  MRI: I have reviewed all pertinent results/findings within the past 24 hours.  possible osteomyelitis to distal phalanx of hallux    Clinical Findings:  S/p I&D with dorsal wound to hallux measuring 3.0x2.0x0.2cm. Does not probe to bone. Mild serosanguinosu bloody drainage. Cellulitis appears to be resolving    Assessment/Plan:     * Cellulitis of great toe of right foot  -MRI reviewed again with patient and his wife. They elect for bone biopsy and treatment with 6 weeks of  antibiotics for osteomyelitis.   -Bedside debridement with bone biopsy performed today, see procedure note. Patient tolerated well  -Site dressed with surgicel and toe ball of gauze and coban for compression and with protective football.   -He is WBAT in forefoot offloading Darco shoe  -Antibiotics per Infectious Disease recommendations. Recommend 6 weeks for osteomyelitis.   -Rest, elevate. Strict offloading to wound site.   -Upon D/C, HH to change dresings 2x per week with saline moistened hydrafera, 4x4, cast padding rolls x2 and light coban.  Pt will need to follow up with podiatry within 1-2 weeks of D/C  -Podiatry will follow     Type 2 diabetes mellitus with diabetic peripheral angiopathy without gangrene  Per hospital medicine    PVD (peripheral vascular disease)  Recommend Interventional Cardiology consult, patient with history of PAD          Jackie Calderon DPM  Podiatry  [place] [facility]

## 2022-01-14 NOTE — SUBJECTIVE & OBJECTIVE
Interval History:  Patient still with pain, PRN meds ordered. ID consulted for abx recs, now on IV vanc, cefepime, and falgyl for presumed OM.    Review of Systems   Constitutional: Negative for fever.   Respiratory: Negative for shortness of breath.    Skin: Positive for rash and wound.   Neurological: Positive for numbness. Negative for dizziness and light-headedness.     Objective:     Vital Signs (Most Recent):  Temp: 98.6 °F (37 °C) (01/13/22 1924)  Pulse: 86 (01/13/22 1924)  Resp: 16 (01/13/22 2126)  BP: (!) 141/72 (01/13/22 1924)  SpO2: 97 % (01/13/22 1924) Vital Signs (24h Range):  Temp:  [97.4 °F (36.3 °C)-98.6 °F (37 °C)] 98.6 °F (37 °C)  Pulse:  [68-86] 86  Resp:  [16-18] 16  SpO2:  [97 %-99 %] 97 %  BP: (131-148)/(72-92) 141/72     Weight: 80 kg (176 lb 5.9 oz)  Body mass index is 22.04 kg/m².    Intake/Output Summary (Last 24 hours) at 1/13/2022 2308  Last data filed at 1/13/2022 1800  Gross per 24 hour   Intake 360 ml   Output 600 ml   Net -240 ml      Physical Exam  Vitals and nursing note reviewed.   Constitutional:       General: He is not in acute distress.     Appearance: Normal appearance. He is not toxic-appearing.   HENT:      Head: Normocephalic and atraumatic.      Nose: Nose normal.      Mouth/Throat:      Mouth: Mucous membranes are moist.   Eyes:      Pupils: Pupils are equal, round, and reactive to light.   Cardiovascular:      Rate and Rhythm: Normal rate and regular rhythm.      Pulses: Normal pulses.      Heart sounds: Normal heart sounds.   Pulmonary:      Effort: Pulmonary effort is normal.      Breath sounds: Normal breath sounds.   Abdominal:      General: Bowel sounds are normal.      Palpations: Abdomen is soft.   Musculoskeletal:         General: Swelling and tenderness present. Normal range of motion.      Cervical back: Normal range of motion.   Skin:     General: Skin is warm and dry.      Findings: Erythema present.      Comments: Foot wrapped. Did not unwrap   Neurological:       Mental Status: He is alert and oriented to person, place, and time.   Psychiatric:         Behavior: Behavior normal.         Thought Content: Thought content normal.         Significant Labs: All pertinent labs within the past 24 hours have been reviewed.    Significant Imaging: I have reviewed all pertinent imaging results/findings within the past 24 hours.

## 2022-01-14 NOTE — PLAN OF CARE
[facility] following for possible home IVABX at d/c.  Educated pt regarding PICC line insertion, awaiting cultures for final ID recs to determine therapy needs.  Pt verbalizes understanding abx to be tailored pending results.  Will f/u with CM team over weekend for further needs.    [facility]  Ashley Neil RN Clinical Liaison  Cell: [phone]  Office: [phone]   Fax:  [phone]

## 2022-01-14 NOTE — PLAN OF CARE
AAOX4. Medications given per MAR. Safety maintained. Call bell within reach. Patient encouraged to call for assistance. Dressing, glucose, and tele monitored. IV abx infusing. Pt denies N/V, SOB, distress. Occasional complaints of pain in RLE, PRN medications given which offered relief. Vital signs stable throughout the night. Afebrile. Will continue to monitor.

## 2022-01-14 NOTE — PLAN OF CARE
Surgicel removed and protective football dressing applied today. Can stay intact for up to 1 week. Rest of care per previous note. Podiatry will follow from a distance.

## 2022-01-14 NOTE — SUBJECTIVE & OBJECTIVE
Subjective:     Interval History: Pt seen today at bedside to review MRI results and for bone biopsy. He is s/p bedside I&D. No new pedal complaints.         Scheduled Meds:   aspirin  81 mg Oral Daily    atorvastatin  40 mg Oral Daily    ceFEPime (MAXIPIME) IVPB  1 g Intravenous Q8H    enoxaparin  40 mg Subcutaneous Daily    losartan  25 mg Oral Daily    metoprolol succinate  25 mg Oral Daily    metroNIDAZOLE  500 mg Oral Q8H    vancomycin (VANCOCIN) IVPB  1,500 mg Intravenous Q12H     Continuous Infusions:  PRN Meds:acetaminophen, dextrose 50%, dextrose 50%, glucagon (human recombinant), glucose, glucose, HYDROcodone-acetaminophen, influenza, insulin aspart U-100, melatonin, naloxone, ondansetron, sodium chloride 0.9%, Pharmacy to dose Vancomycin consult **AND** vancomycin - pharmacy to dose    Review of Systems   Constitutional: Negative for chills, diaphoresis, fatigue and fever.   HENT: Negative for congestion and hearing loss.    Respiratory: Negative for cough and shortness of breath.    Cardiovascular: Positive for leg swelling. Negative for chest pain.   Gastrointestinal: Negative for nausea and vomiting.   Musculoskeletal: Positive for back pain, gait problem and joint swelling.   Skin: Positive for color change and wound. Negative for pallor and rash.   Neurological: Positive for numbness. Negative for tremors, speech difficulty and weakness.   Psychiatric/Behavioral: Negative for agitation and confusion.     Objective:     Vital Signs (Most Recent):  Temp: 97.7 °F (36.5 °C) (01/14/22 0714)  Pulse: 78 (01/14/22 0800)  Resp: 16 (01/14/22 0714)  BP: 115/71 (01/14/22 0714)  SpO2: 98 % (01/14/22 0714) Vital Signs (24h Range):  Temp:  [97.7 °F (36.5 °C)-98.6 °F (37 °C)] 97.7 °F (36.5 °C)  Pulse:  [67-94] 78  Resp:  [16-18] 16  SpO2:  [97 %-99 %] 98 %  BP: (115-151)/(70-86) 115/71     Weight: 80 kg (176 lb 5.9 oz)  Body mass index is 22.04 kg/m².    Foot Exam    General  General Appearance: appears  stated age and healthy   Orientation: alert and oriented to person, place, and time   Affect: appropriate       Right Foot/Ankle     Inspection and Palpation  Ecchymosis: none  Skin Exam: blister, callus, drainage, cellulitis and ulcer;     Neurovascular  Dorsalis pedis: 1+  Posterior tibial: 1+  Saphenous nerve sensation: diminished  Tibial nerve sensation: diminished  Superficial peroneal nerve sensation: diminished  Deep peroneal nerve sensation: diminished  Sural nerve sensation: diminished      Left Foot/Ankle      Inspection and Palpation  Ecchymosis: none  Skin Exam: skin intact;     Neurovascular  Dorsalis pedis: 1+  Posterior tibial: 1+  Saphenous nerve sensation: diminished  Tibial nerve sensation: diminished  Superficial peroneal nerve sensation: diminished  Deep peroneal nerve sensation: diminished  Sural nerve sensation: diminished            Laboratory:  A1C:   Recent Labs   Lab 07/30/21  1340 01/11/22  0309   HGBA1C 6.0* 6.0*     Blood Cultures: No results for input(s): LABBLOO in the last 48 hours.  CBC:   Recent Labs   Lab 01/13/22  0541   WBC 11.96   RBC 4.49*   HGB 12.7*   HCT 39.1*      MCV 87   MCH 28.3   MCHC 32.5     CMP:   Recent Labs   Lab 01/10/22  2114 01/11/22  0309 01/13/22  0541      < > 101   CALCIUM 9.9   < > 9.1   ALBUMIN 4.1  --   --    PROT 7.7  --   --       < > 138   K 4.1   < > 4.4   CO2 25   < > 26      < > 105   BUN 19   < > 24*   CREATININE 1.1   < > 0.8   ALKPHOS 82  --   --    ALT 36  --   --    AST 22  --   --    BILITOT 0.4  --   --     < > = values in this interval not displayed.     CRP:   Recent Labs   Lab 01/10/22  2114   CRP 64.1*     ESR:   Recent Labs   Lab 01/10/22  2114   SEDRATE 34*     Wound Cultures:   Recent Labs   Lab 01/11/22  0823 01/13/22  1438   LABAERO No growth No growth     Microbiology Results (last 7 days)     Procedure Component Value Units Date/Time    Blood culture #1 **CANNOT BE ORDERED STAT** [[id number]] Collected:  01/11/22 0005    Order Status: Completed Specimen: Blood Updated: 01/14/22 1022     Blood Culture, Routine No Growth to date      No Growth to date      No Growth to date      No Growth to date    Blood culture #2 **CANNOT BE ORDERED STAT** [[id number]] Collected: 01/11/22 0005    Order Status: Completed Specimen: Blood Updated: 01/14/22 1022     Blood Culture, Routine No Growth to date      No Growth to date      No Growth to date      No Growth to date    Aerobic culture [[id number]] Collected: 01/11/22 0823    Order Status: Completed Specimen: Abscess from Foot, Right Updated: 01/14/22 1019     Aerobic Bacterial Culture No growth    Aerobic culture [[id number]] Collected: 01/13/22 1438    Order Status: Completed Specimen: Bone from Toe, Right Foot Updated: 01/14/22 0715     Aerobic Bacterial Culture No growth    Culture, Anaerobe [[id number]] Collected: 01/13/22 1438    Order Status: Sent Specimen: Bone from Toe, Right Foot Updated: 01/13/22 1904    Culture, Anaerobe [[id number]] Collected: 01/11/22 0823    Order Status: Completed Specimen: Abscess from Foot, Right Updated: 01/13/22 1125     Anaerobic Culture Culture in progress        Specimen (24h ago, onward)            None          Diagnostic Results:  MRI: I have reviewed all pertinent results/findings within the past 24 hours.  possible osteomyelitis to distal phalanx of hallux    Clinical Findings:  S/p I&D with dorsal wound to hallux measuring 3.0x2.0x0.2cm. Does not probe to bone. Mild serosanguinosu bloody drainage. Cellulitis appears to be resolving

## 2022-01-14 NOTE — PROCEDURES
"Santiago Lucia is a 61 y.o. male patient.    Temp: 97.6 °F (36.4 °C) (01/14/22 1618)  Pulse: 76 (01/14/22 1618)  Resp: 18 (01/14/22 1618)  BP: 129/65 (01/14/22 1618)  SpO2: 99 % (01/14/22 1618)  Weight: 80 kg (176 lb 5.9 oz) (01/12/22 2345)  Height: 6' 3" (190.5 cm) (01/11/22 2045)       Debridement    Date/Time: 1/14/2022 5:25 PM  Performed by: Jackie Calderon DPM  Authorized by: Jackie Calderon DPM     Time out: Immediately prior to procedure a "time out" was called to verify the correct patient, procedure, equipment, support staff and site/side marked as required.    Consent Done?:  Yes (Written)  Local anesthesia used?: No    Anesthesia:  Digital block  Local anesthetic:  Lidocaine 1% without epinephrine  Anesthetic total (ml):  5    Wound Details:    Location:  Right foot    Location:  Right 1st Toe    Type of Debridement:  Excisional       Length (cm):  3       Area (sq cm):  6       Width (cm):  2       Percent Debrided (%):  100       Depth (cm):  0.2       Total Area Debrided (sq cm):  6    Depth of debridement:  Subcutaneous tissue    Tissue debrided:  Subcutaneous and Other    Devitalized tissue debrided:  Biofilm, Callus and Fibrin    Instruments:  Blade and Curette    Bleeding:  Minimal  Patient tolerance:  Patient tolerated the procedure well with no immediate complications     Attention was directed to right hallux. Using a bone biopsy needle, a bone biopsy was obtained through the wound located over the dorsomedial distal phalanx. Bone was sent as specimen to pathology and for culture. Dressings per progress note. Patient tolerated procdure well         1/14/2022    "

## 2022-01-14 NOTE — PLAN OF CARE
Dressings changed today and surgicel removed. Discussed MRI results with patient and treatment options for osteomyelitis including bone biopsy with 6 weeks of IV antibiotics vs amputation. Patient relates he would like antibiotics. Plan for bone biopsy tomorrow. Recommend ID consult.

## 2022-01-15 LAB
ANION GAP SERPL CALC-SCNC: 11 MMOL/L (ref 8–16)
BASOPHILS # BLD AUTO: 0.11 K/UL (ref 0–0.2)
BASOPHILS NFR BLD: 1 % (ref 0–1.9)
BUN SERPL-MCNC: 19 MG/DL (ref 8–23)
CALCIUM SERPL-MCNC: 9.5 MG/DL (ref 8.7–10.5)
CHLORIDE SERPL-SCNC: 103 MMOL/L (ref 95–110)
CO2 SERPL-SCNC: 23 MMOL/L (ref 23–29)
CREAT SERPL-MCNC: 0.9 MG/DL (ref 0.5–1.4)
DIFFERENTIAL METHOD: ABNORMAL
EOSINOPHIL # BLD AUTO: 0.3 K/UL (ref 0–0.5)
EOSINOPHIL NFR BLD: 2.6 % (ref 0–8)
ERYTHROCYTE [DISTWIDTH] IN BLOOD BY AUTOMATED COUNT: 14 % (ref 11.5–14.5)
EST. GFR  (AFRICAN AMERICAN): >60 ML/MIN/1.73 M^2
EST. GFR  (NON AFRICAN AMERICAN): >60 ML/MIN/1.73 M^2
GLUCOSE SERPL-MCNC: 98 MG/DL (ref 70–110)
HCT VFR BLD AUTO: 41.7 % (ref 40–54)
HGB BLD-MCNC: 13.4 G/DL (ref 14–18)
IMM GRANULOCYTES # BLD AUTO: 0.09 K/UL (ref 0–0.04)
IMM GRANULOCYTES NFR BLD AUTO: 0.8 % (ref 0–0.5)
LYMPHOCYTES # BLD AUTO: 2.5 K/UL (ref 1–4.8)
LYMPHOCYTES NFR BLD: 21.6 % (ref 18–48)
MCH RBC QN AUTO: 28.4 PG (ref 27–31)
MCHC RBC AUTO-ENTMCNC: 32.1 G/DL (ref 32–36)
MCV RBC AUTO: 88 FL (ref 82–98)
MONOCYTES # BLD AUTO: 1.3 K/UL (ref 0.3–1)
MONOCYTES NFR BLD: 11.4 % (ref 4–15)
NEUTROPHILS # BLD AUTO: 7.1 K/UL (ref 1.8–7.7)
NEUTROPHILS NFR BLD: 62.6 % (ref 38–73)
NRBC BLD-RTO: 0 /100 WBC
PLATELET # BLD AUTO: 220 K/UL (ref 150–450)
PMV BLD AUTO: 9.3 FL (ref 9.2–12.9)
POCT GLUCOSE: 106 MG/DL (ref 70–110)
POCT GLUCOSE: 108 MG/DL (ref 70–110)
POCT GLUCOSE: 85 MG/DL (ref 70–110)
POTASSIUM SERPL-SCNC: 4.9 MMOL/L (ref 3.5–5.1)
RBC # BLD AUTO: 4.72 M/UL (ref 4.6–6.2)
SODIUM SERPL-SCNC: 137 MMOL/L (ref 136–145)
WBC # BLD AUTO: 11.36 K/UL (ref 3.9–12.7)

## 2022-01-15 PROCEDURE — 36415 COLL VENOUS BLD VENIPUNCTURE: CPT | Performed by: STUDENT IN AN ORGANIZED HEALTH CARE EDUCATION/TRAINING PROGRAM

## 2022-01-15 PROCEDURE — 85025 COMPLETE CBC W/AUTO DIFF WBC: CPT | Performed by: STUDENT IN AN ORGANIZED HEALTH CARE EDUCATION/TRAINING PROGRAM

## 2022-01-15 PROCEDURE — 63600175 PHARM REV CODE 636 W HCPCS: Performed by: STUDENT IN AN ORGANIZED HEALTH CARE EDUCATION/TRAINING PROGRAM

## 2022-01-15 PROCEDURE — 36569 INSJ PICC 5 YR+ W/O IMAGING: CPT

## 2022-01-15 PROCEDURE — 63600175 PHARM REV CODE 636 W HCPCS: Performed by: NURSE PRACTITIONER

## 2022-01-15 PROCEDURE — 80048 BASIC METABOLIC PNL TOTAL CA: CPT | Performed by: STUDENT IN AN ORGANIZED HEALTH CARE EDUCATION/TRAINING PROGRAM

## 2022-01-15 PROCEDURE — 25000003 PHARM REV CODE 250: Performed by: HOSPITALIST

## 2022-01-15 PROCEDURE — 25000003 PHARM REV CODE 250: Performed by: NURSE PRACTITIONER

## 2022-01-15 PROCEDURE — 21400001 HC TELEMETRY ROOM

## 2022-01-15 PROCEDURE — 25000003 PHARM REV CODE 250: Performed by: STUDENT IN AN ORGANIZED HEALTH CARE EDUCATION/TRAINING PROGRAM

## 2022-01-15 PROCEDURE — 63600175 PHARM REV CODE 636 W HCPCS: Performed by: HOSPITALIST

## 2022-01-15 RX ORDER — METRONIDAZOLE 500 MG/1
500 TABLET ORAL EVERY 8 HOURS
Start: 2022-01-15 | End: 2022-01-16

## 2022-01-15 RX ORDER — SODIUM CHLORIDE 0.9 % (FLUSH) 0.9 %
10 SYRINGE (ML) INJECTION EVERY 6 HOURS
Status: DISCONTINUED | OUTPATIENT
Start: 2022-01-16 | End: 2022-01-16 | Stop reason: HOSPADM

## 2022-01-15 RX ORDER — MUPIROCIN 20 MG/G
OINTMENT TOPICAL 2 TIMES DAILY
Status: DISCONTINUED | OUTPATIENT
Start: 2022-01-15 | End: 2022-01-16 | Stop reason: HOSPADM

## 2022-01-15 RX ORDER — SODIUM CHLORIDE 0.9 % (FLUSH) 0.9 %
10 SYRINGE (ML) INJECTION
Status: DISCONTINUED | OUTPATIENT
Start: 2022-01-15 | End: 2022-01-16 | Stop reason: HOSPADM

## 2022-01-15 RX ADMIN — ATORVASTATIN CALCIUM 40 MG: 40 TABLET, FILM COATED ORAL at 08:01

## 2022-01-15 RX ADMIN — METOPROLOL SUCCINATE 25 MG: 25 TABLET, EXTENDED RELEASE ORAL at 08:01

## 2022-01-15 RX ADMIN — HYDROCODONE BITARTRATE AND ACETAMINOPHEN 1 TABLET: 7.5; 325 TABLET ORAL at 08:01

## 2022-01-15 RX ADMIN — ENOXAPARIN SODIUM 40 MG: 100 INJECTION SUBCUTANEOUS at 05:01

## 2022-01-15 RX ADMIN — METRONIDAZOLE 500 MG: 500 TABLET ORAL at 02:01

## 2022-01-15 RX ADMIN — METRONIDAZOLE 500 MG: 500 TABLET ORAL at 06:01

## 2022-01-15 RX ADMIN — ASPIRIN 81 MG: 81 TABLET, COATED ORAL at 08:01

## 2022-01-15 RX ADMIN — CEFTRIAXONE SODIUM 2 G: 2 INJECTION, POWDER, FOR SOLUTION INTRAMUSCULAR; INTRAVENOUS at 02:01

## 2022-01-15 RX ADMIN — MUPIROCIN: 20 OINTMENT TOPICAL at 08:01

## 2022-01-15 RX ADMIN — HYDROCODONE BITARTRATE AND ACETAMINOPHEN 1 TABLET: 7.5; 325 TABLET ORAL at 10:01

## 2022-01-15 RX ADMIN — LOSARTAN POTASSIUM 25 MG: 25 TABLET, FILM COATED ORAL at 08:01

## 2022-01-15 RX ADMIN — CEFEPIME HYDROCHLORIDE 1 G: 1 INJECTION, SOLUTION INTRAVENOUS at 06:01

## 2022-01-15 RX ADMIN — Medication 10 ML: at 08:01

## 2022-01-15 RX ADMIN — HYDROCODONE BITARTRATE AND ACETAMINOPHEN 1 TABLET: 7.5; 325 TABLET ORAL at 02:01

## 2022-01-15 RX ADMIN — METRONIDAZOLE 500 MG: 500 TABLET ORAL at 08:01

## 2022-01-15 RX ADMIN — VANCOMYCIN HYDROCHLORIDE 1500 MG: 1.5 INJECTION, POWDER, LYOPHILIZED, FOR SOLUTION INTRAVENOUS at 08:01

## 2022-01-15 NOTE — ASSESSMENT & PLAN NOTE
Mr. Lucia is a 62yo man w/a history of CAD (prior NSTEMI s/p PCI 2013), HTN, DM2 (A1c=6%, diet controlled), PVD, and gout who was admitted on 1/10/2022 with 3 days of progressively worsening pain, redness, and swelling of his R great toe and distal forefoot due to cellulitis with underlying toe osteomyelitis (equivocal findings in great toe distal phalanx per MRI). He was afebrile without a leukocytosis on admission and started on empiric vanc, cefepime, and metronidazole. He underwent drainage of a dorsal toe abscess (20cc purulence cultured) by podiatry yesterday and ID has been consulted to comment on antimicrobial therapy. He denies systemic symptoms including fevers, chills, sweats, or nausea and notes feeling improved since admission.     MRI shows osteo of the right great toe  - will need 6 weeks of treatment  - most likely with IVabx to some degree     1. Keep on vanc, cefepime and flagyl   2. Will likely need PICC   3. Await cultures

## 2022-01-15 NOTE — PROGRESS NOTES
VANCOMYCIN DOSING BY PHARMACY DISCONTINUATION NOTE    Santiago Lucia is a 61 y.o. male who had been consulted for vancomycin dosing.    The pharmacy consult for vancomycin dosing has been discontinued.     Vancomycin Dosing by Pharmacy Consult will sign-off. Please reconsult if necessary. Thank you for allowing us to participate in this patient's care.     Thank you for allowing pharmacy participate in this patient's care.     Maya Guzman, PharmD  Clinical Pharmacist, [facility] Pharm[facility] Team  [email or web address]  office [phone]

## 2022-01-15 NOTE — HPI
Mr. Lucia is a 60yo man w/a history of CAD (prior NSTEMI s/p PCI 2013), HTN, DM2 (A1c=6%, diet controlled), PVD, and gout who was admitted on 1/10/2022 with 3 days of progressively worsening pain, redness, and swelling of his R great toe and distal forefoot due to cellulitis with underlying toe osteomyelitis (equivocal findings in great toe distal phalanx per MRI). He was afebrile without a leukocytosis on admission and started on empiric vanc, cefepime, and metronidazole. He underwent drainage of a dorsal toe abscess (20cc purulence cultured) by podiatry yesterday and ID has been consulted to comment on antimicrobial therapy. He denies systemic symptoms including fevers, chills, sweats, or nausea and notes feeling improved since admission.

## 2022-01-15 NOTE — PROGRESS NOTES
[facility]  Infectious Disease  Progress Note    Patient Name: Santiago Lucia  MRN: [id number]  Admission Date: 1/10/2022  Length of Stay: 2 days  Attending Physician: Arlene Kurtz MD  Primary Care Provider: Yon Asif MD    Isolation Status: No active isolations  Assessment/Plan:      * Cellulitis of great toe of right foot  Mr. Lucia is a 60yo man w/a history of CAD (prior NSTEMI s/p PCI 2013), HTN, DM2 (A1c=6%, diet controlled), PVD, and gout who was admitted on 1/10/2022 with 3 days of progressively worsening pain, redness, and swelling of his R great toe and distal forefoot due to cellulitis with underlying toe osteomyelitis (equivocal findings in great toe distal phalanx per MRI). He was afebrile without a leukocytosis on admission and started on empiric vanc, cefepime, and metronidazole. He underwent drainage of a dorsal toe abscess (20cc purulence cultured) by podiatry yesterday and ID has been consulted to comment on antimicrobial therapy. He denies systemic symptoms including fevers, chills, sweats, or nausea and notes feeling improved since admission.     MRI shows osteo of the right great toe  - will need 6 weeks of treatment  - most likely with IVabx to some degree     1. Keep on vanc, cefepime and flagyl   2. Will likely need PICC   3. Await cultures         Anticipated Disposition:     Thank you for your consult. I will follow-up with patient. Please contact us if you have any additional questions.    Juan Monterroso MD  Infectious Disease  [facility]    Subjective:     Principal Problem:Cellulitis of great toe of right foot    HPI: Mr. Lucia is a 60yo man w/a history of CAD (prior NSTEMI s/p PCI 2013), HTN, DM2 (A1c=6%, diet controlled), PVD, and gout who was admitted on 1/10/2022 with 3 days of progressively worsening pain, redness, and swelling of his R great toe and distal forefoot due to cellulitis with underlying toe osteomyelitis (equivocal findings in  great toe distal phalanx per MRI). He was afebrile without a leukocytosis on admission and started on empiric vanc, cefepime, and metronidazole. He underwent drainage of a dorsal toe abscess (20cc purulence cultured) by podiatry yesterday and ID has been consulted to comment on antimicrobial therapy. He denies systemic symptoms including fevers, chills, sweats, or nausea and notes feeling improved since admission.     Interval History: Patient doing OK today - working with the PICC nurse to move forward with IV abx. No fevers or chills     Review of Systems   Constitutional: Positive for activity change. Negative for appetite change, chills, fatigue and fever.   HENT: Negative.    Eyes: Negative.    Respiratory: Negative.    Cardiovascular: Negative.    Gastrointestinal: Negative.    Endocrine: Negative.    Genitourinary: Negative.    Musculoskeletal: Negative.    Skin: Positive for rash and wound.   Allergic/Immunologic: Positive for immunocompromised state.   Neurological: Positive for numbness.   Hematological: Negative.      Objective:     Vital Signs (Most Recent):  Temp: 98.2 °F (36.8 °C) (01/14/22 2018)  Pulse: 82 (01/14/22 2018)  Resp: 19 (01/14/22 2202)  BP: 125/69 (01/14/22 2018)  SpO2: 98 % (01/14/22 2018) Vital Signs (24h Range):  Temp:  [97.6 °F (36.4 °C)-98.2 °F (36.8 °C)] 98.2 °F (36.8 °C)  Pulse:  [67-82] 82  Resp:  [16-20] 19  SpO2:  [98 %-99 %] 98 %  BP: (115-151)/(65-86) 125/69     Weight: 80 kg (176 lb 5.9 oz)  Body mass index is 22.04 kg/m².    Estimated Creatinine Clearance: 109.7 mL/min (based on SCr of 0.8 mg/dL).    Physical Exam  Constitutional:       Appearance: Normal appearance.   HENT:      Mouth/Throat:      Mouth: Mucous membranes are dry.   Eyes:      Extraocular Movements: Extraocular movements intact.   Cardiovascular:      Rate and Rhythm: Normal rate and regular rhythm.   Pulmonary:      Effort: Pulmonary effort is normal.      Breath sounds: Normal breath sounds.   Abdominal:       General: Abdomen is flat. There is distension.   Musculoskeletal:         General: Signs of injury present.      Cervical back: Normal range of motion and neck supple.   Skin:     Comments: Right big toe lesion - dressing not removed    Neurological:      Mental Status: He is alert.         Significant Labs: All pertinent labs within the past 24 hours have been reviewed.    Significant Imaging: I have reviewed all pertinent imaging results/findings within the past 24 hours.

## 2022-01-15 NOTE — PROGRESS NOTES
[facility] - [facility]  Infectious Disease  Progress Note    Patient Name: Santiago Lucia  MRN: [id number]  Admission Date: 1/10/2022  Length of Stay: 3 days  Attending Physician: Arlene Kurtz MD  Primary Care Provider: Yon Asif MD    Isolation Status: No active isolations  Assessment/Plan:      No new Assessment & Plan notes have been filed under this hospital service since the last note was generated.  Service: Infectious Diseases      Anticipated Disposition:     Thank you for your consult. I will follow-up with patient. Please contact us if you have any additional questions.    Juan Monterroso MD  Infectious Disease  [facility] - [facility]    Subjective:     Principal Problem:Cellulitis of great toe of right foot    HPI: Mr. Lucia is a 60yo man w/a history of CAD (prior NSTEMI s/p PCI 2013), HTN, DM2 (A1c=6%, diet controlled), PVD, and gout who was admitted on 1/10/2022 with 3 days of progressively worsening pain, redness, and swelling of his R great toe and distal forefoot due to cellulitis with underlying toe osteomyelitis (equivocal findings in great toe distal phalanx per MRI). He was afebrile without a leukocytosis on admission and started on empiric vanc, cefepime, and metronidazole. He underwent drainage of a dorsal toe abscess (20cc purulence cultured) by podiatry yesterday and ID has been consulted to comment on antimicrobial therapy. He denies systemic symptoms including fevers, chills, sweats, or nausea and notes feeling improved since admission.     No new subjective & objective note has been filed under this hospital service since the last note was generated.

## 2022-01-15 NOTE — SUBJECTIVE & OBJECTIVE
Interval History: Patient doing OK today - working with the PICC nurse to move forward with IV abx. No fevers or chills     Review of Systems   Constitutional: Positive for activity change. Negative for appetite change, chills, fatigue and fever.   HENT: Negative.    Eyes: Negative.    Respiratory: Negative.    Cardiovascular: Negative.    Gastrointestinal: Negative.    Endocrine: Negative.    Genitourinary: Negative.    Musculoskeletal: Negative.    Skin: Positive for rash and wound.   Allergic/Immunologic: Positive for immunocompromised state.   Neurological: Positive for numbness.   Hematological: Negative.      Objective:     Vital Signs (Most Recent):  Temp: 98.2 °F (36.8 °C) (01/14/22 2018)  Pulse: 82 (01/14/22 2018)  Resp: 19 (01/14/22 2202)  BP: 125/69 (01/14/22 2018)  SpO2: 98 % (01/14/22 2018) Vital Signs (24h Range):  Temp:  [97.6 °F (36.4 °C)-98.2 °F (36.8 °C)] 98.2 °F (36.8 °C)  Pulse:  [67-82] 82  Resp:  [16-20] 19  SpO2:  [98 %-99 %] 98 %  BP: (115-151)/(65-86) 125/69     Weight: 80 kg (176 lb 5.9 oz)  Body mass index is 22.04 kg/m².    Estimated Creatinine Clearance: 109.7 mL/min (based on SCr of 0.8 mg/dL).    Physical Exam  Constitutional:       Appearance: Normal appearance.   HENT:      Mouth/Throat:      Mouth: Mucous membranes are dry.   Eyes:      Extraocular Movements: Extraocular movements intact.   Cardiovascular:      Rate and Rhythm: Normal rate and regular rhythm.   Pulmonary:      Effort: Pulmonary effort is normal.      Breath sounds: Normal breath sounds.   Abdominal:      General: Abdomen is flat. There is distension.   Musculoskeletal:         General: Signs of injury present.      Cervical back: Normal range of motion and neck supple.   Skin:     Comments: Right big toe lesion - dressing not removed    Neurological:      Mental Status: He is alert.         Significant Labs: All pertinent labs within the past 24 hours have been reviewed.    Significant Imaging: I have reviewed all  pertinent imaging results/findings within the past 24 hours.

## 2022-01-15 NOTE — ASSESSMENT & PLAN NOTE
Presents with pain and swelling to right great toe, swelling extends to foot  WBC 16, XRAY with cellulitis  Foot is warm to touch with erythema and edema noted, right great toe with extensive swelling noted, dry calluses under toe.  Given vanc and rocephin in ED  -cont vanc and switch to cefepime/Flagyl  -MRI noted and likely acute osteomyelitis  -Podiatry consulted and appreciate recommendations  -Pending tissue culture data to tailor antibiotics  - ID re-consulted, now on IV cefepime, vancomycin, and flagyl

## 2022-01-15 NOTE — PLAN OF CARE
Full note to follow     Patient with OM of right great toe - podiatry has completed debridement  - unclear if seen by cardiology or not. All culture negative     Plan:   1. Can change to IV ceftriaxone 2g IV qd and po flagyl 500 mg tid    will need 6 weeks of therapy     2. Cardiology consult for vascular status if indicated

## 2022-01-15 NOTE — PLAN OF CARE
[facility]      HOME HEALTH ORDERS  FACE TO FACE ENCOUNTER    Patient Name: Santiago Lucia  YOB: 1960    PCP: Yon Asif MD   PCP Address: [address] / [place] [address]  PCP Phone Number: [phone]  PCP Fax: [phone]    Encounter Date: 1/10/22    Admit to Home Health    Diagnoses:  Active Hospital Problems    Diagnosis  POA    *Cellulitis of great toe of right foot [L03.031]  Yes    Type 2 diabetes mellitus with diabetic peripheral angiopathy without gangrene [E11.51]  Yes    PVD (peripheral vascular disease) [I73.9]  Yes    HTN (hypertension) [I10]  Yes    Dyslipidemia [E78.5]  Yes    CAD (coronary artery disease) [I25.10]  Yes      Resolved Hospital Problems   No resolved problems to display.       Follow Up Appointments:  Future Appointments   Date Time Provider Department Center   4/5/2022  2:00 PM Yon Asif MD [facility]       Allergies:Review of patient's allergies indicates:  No Known Allergies    Medications: Review discharge medications with patient and family and provide education.    Current Facility-Administered Medications   Medication Dose Route Frequency Provider Last Rate Last Admin    acetaminophen tablet 650 mg  650 mg Oral Q4H PRN Alicia England NP   650 mg at 01/13/22 2035    aspirin EC tablet 81 mg  81 mg Oral Daily Alicia England NP   81 mg at 01/15/22 0837    atorvastatin tablet 40 mg  40 mg Oral Daily Alicia England NP   40 mg at 01/15/22 0837    cefTRIAXone (ROCEPHIN) 2 g/50 mL D5W IVPB  2 g Intravenous Q24H Arlene Kurtz MD 50 mL/hr at 01/15/22 1409 2 g at 01/15/22 1409    dextrose 50% injection 12.5 g  12.5 g Intravenous PRN Alicia England NP        dextrose 50% injection 25 g  25 g Intravenous PRN Alicia England NP        enoxaparin injection 40 mg  40 mg Subcutaneous Daily Alicia England NP   40 mg at 01/14/22 1635    glucagon (human recombinant)  injection 1 mg  1 mg Intramuscular PRN Alicia England NP        glucose chewable tablet 16 g  16 g Oral PRN Alicia England NP        glucose chewable tablet 24 g  24 g Oral PRN Alicia England NP        HYDROcodone-acetaminophen 7.5-325 mg per tablet 1 tablet  1 tablet Oral Q4H PRN Arlene Kurtz MD   1 tablet at 01/15/22 1405    influenza (QUADRIVALENT PF) vaccine 0.5 mL  0.5 mL Intramuscular vaccine x 1 dose Arlene Kurtz MD        insulin aspart U-100 pen 0-5 Units  0-5 Units Subcutaneous QID (AC + HS) PRN Alicia England NP        losartan tablet 25 mg  25 mg Oral Daily Ari Hudson MD   25 mg at 01/15/22 0837    melatonin tablet 6 mg  6 mg Oral Nightly PRN Alicia England NP        metoprolol succinate (TOPROL-XL) 24 hr tablet 25 mg  25 mg Oral Daily Alicia England NP   25 mg at 01/15/22 0837    metroNIDAZOLE tablet 500 mg  500 mg Oral Q8H Theo Liriano MD   500 mg at 01/15/22 1405    naloxone 0.4 mg/mL injection 0.02 mg  0.02 mg Intravenous PRN Alicia England NP        ondansetron injection 4 mg  4 mg Intravenous Q8H PRN Alicia England NP   4 mg at 01/11/22 0051    sodium chloride 0.9% flush 10 mL  10 mL Intravenous Q12H PRN Alicia England NP         Current Discharge Medication List      START taking these medications    Details   cefTRIAXone (ROCEPHIN) 2 g/50 mL PgBk IVPB Inject 50 mLs (2 g total) into the vein once daily.  Qty: 1500 mL, Refills: 1      losartan (COZAAR) 25 MG tablet Take 1 tablet (25 mg total) by mouth once daily.  Qty: 90 tablet, Refills: 3    Comments: .      metroNIDAZOLE (FLAGYL) 500 MG tablet Take 1 tablet (500 mg total) by mouth every 8 (eight) hours.  Qty: 117 tablet, Refills: 0         CONTINUE these medications which have CHANGED    Details   HYDROcodone-acetaminophen (NORCO) 7.5-325 mg per tablet Take 1 tablet by mouth every 12 (twelve) hours as needed for Pain.  Qty: 10  tablet, Refills: 0    Comments: **Greater than 7 day supply medically necessary.**  Associated Diagnoses: Pain in joint involving pelvic region and thigh, unspecified laterality; Avascular necrosis of bone of hip, left         CONTINUE these medications which have NOT CHANGED    Details   ascorbic acid, vitamin C, (VITAMIN C) 500 MG tablet Take 500 mg by mouth once daily.      aspirin (ECOTRIN) 81 MG EC tablet Take 1 tablet (81 mg total) by mouth once daily.  Qty: 30 tablet, Refills: 6      COLLAGEN MISC Take 1 tablet by mouth once daily.      diclofenac (VOLTAREN) 75 MG EC tablet Take 1 tablet (75 mg total) by mouth 2 (two) times daily.  Qty: 60 tablet, Refills: 6    Associated Diagnoses: Avascular necrosis of bone of hip, left; Dorsalgia, unspecified      magnesium oxide (MAG-OX) 400 mg (241.3 mg magnesium) tablet Take 400 mg by mouth once daily.      metoprolol succinate (TOPROL-XL) 25 MG 24 hr tablet TAKE 1 TABLET(25 MG) BY MOUTH EVERY DAY  Qty: 90 tablet, Refills: 3    Associated Diagnoses: Coronary artery disease      rosuvastatin (CRESTOR) 10 MG tablet TAKE 1 TABLET(10 MG) BY MOUTH EVERY DAY  Qty: 90 tablet, Refills: 3    Associated Diagnoses: Coronary artery disease; PVD (peripheral vascular disease)      tadalafiL (CIALIS) 20 MG Tab Take 1 tablet (20 mg total) by mouth as needed.  Qty: 60 tablet, Refills: 3    Associated Diagnoses: ED (erectile dysfunction) of organic origin      vitamin D (VITAMIN D3) 1000 units Tab Take 1,000 Units by mouth once daily.      zinc sulfate (ZINCATE) 50 mg zinc (220 mg) capsule Take 220 mg by mouth once daily.               I have seen and examined this patient within the last 30 days. My clinical findings that support the need for the home health skilled services and home bound status are the following:no   Weakness/numbness causing balance and gait disturbance due to Infection making it taxing to leave home.     Diet:   cardiac diet    Labs:  CBC, CMP, ESR and CRP. Labs to  be faxed to ID office, attention to Dr Cancino, [phone]. Please schedule [facility] ID clinic follow up 3-4 weeks after discharge,     Referrals/ Consults   to evaluate for community resources/long-range planning.  Aide to provide assistance with personal care, ADLs, and vital signs.    Activities:   WBAT in forefoot offloading Darco shoe    Nursing:   Agency to admit patient within 24 hours of hospital discharge unless specified on physician order or at patient request    SN to complete comprehensive assessment including routine vital signs. Instruct on disease process and s/s of complications to report to MD. Review/verify medication list sent home with the patient at time of discharge  and instruct patient/caregiver as needed. Frequency may be adjusted depending on start of care date.     Skilled nurse to perform up to 3 visits PRN for symptoms related to diagnosis    Notify MD if SBP > 160 or < 90; DBP > 90 or < 50; HR > 120 or < 50; Temp > 101; O2 < 88%;     Ok to schedule additional visits based on staff availability and patient request on consecutive days within the home health episode.    When multiple disciplines ordered:    Start of Care occurs on Sunday - Wednesday schedule remaining discipline evaluations as ordered on separate consecutive days following the start of care.    Thursday SOC -schedule subsequent evaluations Friday and Monday the following week.     Friday - Saturday SOC - schedule subsequent discipline evaluations on consecutive days starting Monday of the following week.    For all post-discharge communication and subsequent orders please contact patient's primary care physician.    Miscellaneous   Home Infusion Therapy:   SN to perform Infusion Therapy/Central Line Care.  Review Central Line Care & Central Line Flush with patient.    Administer (drug and dose): Ceftriaxone 2 g daily (end date 2/24/22)     Last dose given: 1/16/22                       Home dose due:  1/17/22    Scrub the Hub: Prior to accessing the line, always perform a 30 second alcohol scrub  Each lumen of the central line is to be flushed at least daily with 10 mL Normal Saline and 3 mL Heparin flush (10 units/mL)  Skilled Nurse (SN) may draw blood from IV access  Blood Draw Procedure:   - Aspirate at least 5 mL of blood   - Discard   - Obtain specimen   - Change injection cap   - Flush with 20 mL Normal Saline followed by a                 3-5 mL Heparin flush (10 units/mL)  Central :   - Sterile dressing changes are done weekly and as needed.   - Use chlor-hexadine scrub to cleanse site, apply Biopatch to insertion site,       apply securement device dressing   - Injection caps are changed weekly and after EVERY lab draw.   - If sterile gauze is under dressing to control oozing,                 dressing change must be performed every 24 hours until gauze is not needed.    Home Health Aide:  Nursing Daily and Home Health Aide Daily    Wound Care Orders  R great toe   change dresings 2x per week with saline moistened hydrafera, 4x4, cast padding rolls x2 and light coban    I certify that this patient is confined to his home and needs intermittent skilled nursing care.

## 2022-01-15 NOTE — PROGRESS NOTES
[facility]  Telemedicine Progress Note    Patient Name: Santiago Lucia  MRN: [id number]  Patient Class: IP- Inpatient   Admission Date: 1/10/2022  Length of Stay: 2 days  Attending Physician: Arlene Kurtz MD  Primary Care Provider: Yon Asif MD          Subjective:     Principal Problem:Cellulitis of great toe of right foot        HPI:  Santiago Lucia is a 60 yo male with a pmh of CAD, NSTEMI, HTN, PVD, gout. He presented with worsening pain and swelling of right great toe x 3 days. Today he noted his toe was throbbing and his entire right foot had swollen. He has been seeing Dr Stevenson with podiatry for chronic issues with the toe including callus formations. He denies drainage to the toe. He reports history of gout with 3 attacks in the past. He denies having diabetes and does not take any meds for this. No chills or fevers, no nausea. No other complaints.     In the ED, he was noted to be tachycardic and had WBC 16. Foot XRAY with cellulitis.       Overview/Hospital Course:  No notes on file    Interval History:  Patient still with pain, PRN meds ordered. ID consulted for abx recs, now on IV vanc, cefepime, and flagyl for presumed OM. Pending final recs for discharge with PICC and likely IV abx.    Review of Systems   Constitutional: Negative for fever.   Respiratory: Negative for shortness of breath.    Skin: Positive for rash and wound.   Neurological: Positive for numbness. Negative for dizziness and light-headedness.     Objective:     Vital Signs (Most Recent):  Temp: 98.2 °F (36.8 °C) (01/14/22 2018)  Pulse: 82 (01/14/22 2018)  Resp: 19 (01/14/22 2202)  BP: 125/69 (01/14/22 2018)  SpO2: 98 % (01/14/22 2018) Vital Signs (24h Range):  Temp:  [97.6 °F (36.4 °C)-98.2 °F (36.8 °C)] 98.2 °F (36.8 °C)  Pulse:  [67-82] 82  Resp:  [16-20] 19  SpO2:  [98 %-99 %] 98 %  BP: (115-129)/(65-71) 125/69     Weight: 80 kg (176 lb 5.9 oz)  Body mass index is 22.04  kg/m².    Intake/Output Summary (Last 24 hours) at 1/14/2022 2349  Last data filed at 1/14/2022 0655  Gross per 24 hour   Intake 707.07 ml   Output 350 ml   Net 357.07 ml      Physical Exam  Vitals and nursing note reviewed.   Constitutional:       General: He is not in acute distress.     Appearance: Normal appearance. He is not toxic-appearing.   HENT:      Head: Normocephalic and atraumatic.      Nose: Nose normal.      Mouth/Throat:      Mouth: Mucous membranes are moist.   Eyes:      Pupils: Pupils are equal, round, and reactive to light.   Cardiovascular:      Rate and Rhythm: Normal rate and regular rhythm.      Pulses: Normal pulses.      Heart sounds: Normal heart sounds.   Pulmonary:      Effort: Pulmonary effort is normal.      Breath sounds: Normal breath sounds.   Abdominal:      General: Bowel sounds are normal.      Palpations: Abdomen is soft.   Musculoskeletal:         General: Swelling and tenderness present. Normal range of motion.      Cervical back: Normal range of motion.   Skin:     General: Skin is warm and dry.      Findings: Erythema present.      Comments: Foot wrapped. Did not unwrap   Neurological:      Mental Status: He is alert and oriented to person, place, and time.   Psychiatric:         Behavior: Behavior normal.         Thought Content: Thought content normal.         Significant Labs: All pertinent labs within the past 24 hours have been reviewed.    Significant Imaging: I have reviewed all pertinent imaging results/findings within the past 24 hours.      Assessment/Plan:      * Cellulitis of great toe of right foot  Presents with pain and swelling to right great toe, swelling extends to foot  WBC 16, XRAY with cellulitis  Foot is warm to touch with erythema and edema noted, right great toe with extensive swelling noted, dry calluses under toe.  Given vanc and rocephin in ED  -cont vanc and switch to cefepime/Flagyl  -MRI noted and likely acute osteomyelitis  -Podiatry consulted  and appreciate recommendations  -Pending tissue culture data to tailor antibiotics  - ID re-consulted, now on IV cefepime, vancomycin, and flagyl     Type 2 diabetes mellitus with diabetic peripheral angiopathy without gangrene  Diet controlled  A1C 6.0  accuchecks and SSI      PVD (peripheral vascular disease)  Cont statin      CAD (coronary artery disease)  Cont ASA, statin      Dyslipidemia  Cont statin      HTN (hypertension)  Cont metoprolol  - add Losartan       VTE Risk Mitigation (From admission, onward)         Ordered     enoxaparin injection 40 mg  Daily         01/11/22 0001     IP VTE HIGH RISK PATIENT  Once         01/11/22 0001     Place sequential compression device  Until discontinued         01/11/22 0001                      I have assessed these finding virtually using telemed platform and with assistance of bedside nurse         The attending portion of this evaluation, treatment, and documentation was performed per Arlene Kurtz MD via Telemedicine AudioVisual using the secure [employer] software platform with 2 way audio/video. The provider was located off-site and the patient is located in the hospital. The aforementioned video software was utilized to document the relevant history and physical exam    Arlene Kurtz MD  Department of Hospital Medicine   [facility]

## 2022-01-15 NOTE — SUBJECTIVE & OBJECTIVE
Interval History:  Patient still with pain, PRN meds ordered. ID consulted for abx recs, now on IV vanc, cefepime, and flagyl for presumed OM. Pending final recs for discharge with PICC and likely IV abx.    Review of Systems   Constitutional: Negative for fever.   Respiratory: Negative for shortness of breath.    Skin: Positive for rash and wound.   Neurological: Positive for numbness. Negative for dizziness and light-headedness.     Objective:     Vital Signs (Most Recent):  Temp: 98.2 °F (36.8 °C) (01/14/22 2018)  Pulse: 82 (01/14/22 2018)  Resp: 19 (01/14/22 2202)  BP: 125/69 (01/14/22 2018)  SpO2: 98 % (01/14/22 2018) Vital Signs (24h Range):  Temp:  [97.6 °F (36.4 °C)-98.2 °F (36.8 °C)] 98.2 °F (36.8 °C)  Pulse:  [67-82] 82  Resp:  [16-20] 19  SpO2:  [98 %-99 %] 98 %  BP: (115-129)/(65-71) 125/69     Weight: 80 kg (176 lb 5.9 oz)  Body mass index is 22.04 kg/m².    Intake/Output Summary (Last 24 hours) at 1/14/2022 2349  Last data filed at 1/14/2022 0655  Gross per 24 hour   Intake 707.07 ml   Output 350 ml   Net 357.07 ml      Physical Exam  Vitals and nursing note reviewed.   Constitutional:       General: He is not in acute distress.     Appearance: Normal appearance. He is not toxic-appearing.   HENT:      Head: Normocephalic and atraumatic.      Nose: Nose normal.      Mouth/Throat:      Mouth: Mucous membranes are moist.   Eyes:      Pupils: Pupils are equal, round, and reactive to light.   Cardiovascular:      Rate and Rhythm: Normal rate and regular rhythm.      Pulses: Normal pulses.      Heart sounds: Normal heart sounds.   Pulmonary:      Effort: Pulmonary effort is normal.      Breath sounds: Normal breath sounds.   Abdominal:      General: Bowel sounds are normal.      Palpations: Abdomen is soft.   Musculoskeletal:         General: Swelling and tenderness present. Normal range of motion.      Cervical back: Normal range of motion.   Skin:     General: Skin is warm and dry.      Findings: Erythema  present.      Comments: Foot wrapped. Did not unwrap   Neurological:      Mental Status: He is alert and oriented to person, place, and time.   Psychiatric:         Behavior: Behavior normal.         Thought Content: Thought content normal.         Significant Labs: All pertinent labs within the past 24 hours have been reviewed.    Significant Imaging: I have reviewed all pertinent imaging results/findings within the past 24 hours.

## 2022-01-15 NOTE — PLAN OF CARE
Pt AOX4. C/o pain 8/10 to incision, relieved with PRN pain meds. Scheduled medications given per MAR. Pt sat on recliner for part of shift. Safety precautions in place.

## 2022-01-16 VITALS
SYSTOLIC BLOOD PRESSURE: 138 MMHG | HEART RATE: 75 BPM | RESPIRATION RATE: 20 BRPM | HEIGHT: 75 IN | WEIGHT: 176.38 LBS | OXYGEN SATURATION: 99 % | BODY MASS INDEX: 21.93 KG/M2 | TEMPERATURE: 98 F | DIASTOLIC BLOOD PRESSURE: 77 MMHG

## 2022-01-16 LAB
BACTERIA BLD CULT: NORMAL
BACTERIA BLD CULT: NORMAL
POCT GLUCOSE: 117 MG/DL (ref 70–110)
POCT GLUCOSE: 117 MG/DL (ref 70–110)

## 2022-01-16 PROCEDURE — A4216 STERILE WATER/SALINE, 10 ML: HCPCS | Performed by: STUDENT IN AN ORGANIZED HEALTH CARE EDUCATION/TRAINING PROGRAM

## 2022-01-16 PROCEDURE — 63600175 PHARM REV CODE 636 W HCPCS: Performed by: STUDENT IN AN ORGANIZED HEALTH CARE EDUCATION/TRAINING PROGRAM

## 2022-01-16 PROCEDURE — 25000003 PHARM REV CODE 250: Performed by: HOSPITALIST

## 2022-01-16 PROCEDURE — 25000003 PHARM REV CODE 250: Performed by: STUDENT IN AN ORGANIZED HEALTH CARE EDUCATION/TRAINING PROGRAM

## 2022-01-16 PROCEDURE — 25000003 PHARM REV CODE 250: Performed by: NURSE PRACTITIONER

## 2022-01-16 RX ORDER — METRONIDAZOLE 500 MG/1
500 TABLET ORAL EVERY 8 HOURS
Qty: 117 TABLET | Refills: 0 | Status: SHIPPED | OUTPATIENT
Start: 2022-01-16 | End: 2022-02-24

## 2022-01-16 RX ORDER — HYDROCODONE BITARTRATE AND ACETAMINOPHEN 7.5; 325 MG/1; MG/1
1 TABLET ORAL EVERY 12 HOURS PRN
Qty: 10 TABLET | Refills: 0 | Status: SHIPPED | OUTPATIENT
Start: 2022-01-16 | End: 2022-01-19 | Stop reason: SDUPTHER

## 2022-01-16 RX ORDER — LOSARTAN POTASSIUM 25 MG/1
25 TABLET ORAL DAILY
Qty: 90 TABLET | Refills: 3 | Status: SHIPPED | OUTPATIENT
Start: 2022-01-17 | End: 2023-09-21 | Stop reason: SDUPTHER

## 2022-01-16 RX ADMIN — METOPROLOL SUCCINATE 25 MG: 25 TABLET, EXTENDED RELEASE ORAL at 09:01

## 2022-01-16 RX ADMIN — ATORVASTATIN CALCIUM 40 MG: 40 TABLET, FILM COATED ORAL at 09:01

## 2022-01-16 RX ADMIN — HYDROCODONE BITARTRATE AND ACETAMINOPHEN 1 TABLET: 7.5; 325 TABLET ORAL at 05:01

## 2022-01-16 RX ADMIN — ASPIRIN 81 MG: 81 TABLET, COATED ORAL at 09:01

## 2022-01-16 RX ADMIN — CEFTRIAXONE SODIUM 2 G: 2 INJECTION, POWDER, FOR SOLUTION INTRAMUSCULAR; INTRAVENOUS at 11:01

## 2022-01-16 RX ADMIN — Medication 10 ML: at 11:01

## 2022-01-16 RX ADMIN — LOSARTAN POTASSIUM 25 MG: 25 TABLET, FILM COATED ORAL at 09:01

## 2022-01-16 RX ADMIN — Medication 10 ML: at 05:01

## 2022-01-16 RX ADMIN — METRONIDAZOLE 500 MG: 500 TABLET ORAL at 05:01

## 2022-01-16 NOTE — PROGRESS NOTES
[facility]  Telemedicine Progress Note    Patient Name: Santiago Lucia  MRN: [id number]  Patient Class: IP- Inpatient   Admission Date: 1/10/2022  Length of Stay: 3 days  Attending Physician: Arlene Kurtz MD  Primary Care Provider: Yon Asif MD          Subjective:     Principal Problem:Cellulitis of great toe of right foot        HPI:  Santiago Lucia is a 60 yo male with a pmh of CAD, NSTEMI, HTN, PVD, gout. He presented with worsening pain and swelling of right great toe x 3 days. Today he noted his toe was throbbing and his entire right foot had swollen. He has been seeing Dr Stevenson with podiatry for chronic issues with the toe including callus formations. He denies drainage to the toe. He reports history of gout with 3 attacks in the past. He denies having diabetes and does not take any meds for this. No chills or fevers, no nausea. No other complaints.     In the ED, he was noted to be tachycardic and had WBC 16. Foot XRAY with cellulitis.       Overview/Hospital Course:  No notes on file    Interval History:  Patient still with pain, PRN meds ordered. ID consulted for abx recs, now on IV ceftriaxone and flagyl for presumed OM. Pending final recs for discharge with PICC and IV abx.    Review of Systems   Constitutional: Negative for fever.   Respiratory: Negative for shortness of breath.    Skin: Positive for rash and wound.   Neurological: Positive for numbness. Negative for dizziness and light-headedness.     Objective:     Vital Signs (Most Recent):  Temp: 98.2 °F (36.8 °C) (01/15/22 2021)  Pulse: 77 (01/15/22 2021)  Resp: 20 (01/15/22 2021)  BP: 114/68 (01/15/22 2021)  SpO2: 98 % (01/15/22 2021) Vital Signs (24h Range):  Temp:  [97.3 °F (36.3 °C)-98.7 °F (37.1 °C)] 98.2 °F (36.8 °C)  Pulse:  [63-92] 77  Resp:  [18-20] 20  SpO2:  [96 %-99 %] 98 %  BP: (110-137)/(68-82) 114/68     Weight: 80 kg (176 lb 5.9 oz)  Body mass index is 22.04 kg/m².    Intake/Output  Summary (Last 24 hours) at 1/15/2022 2059  Last data filed at 1/15/2022 0500  Gross per 24 hour   Intake --   Output 500 ml   Net -500 ml      Physical Exam  Vitals and nursing note reviewed.   Constitutional:       General: He is not in acute distress.     Appearance: Normal appearance. He is not toxic-appearing.   HENT:      Head: Normocephalic and atraumatic.      Nose: Nose normal.      Mouth/Throat:      Mouth: Mucous membranes are moist.   Eyes:      Pupils: Pupils are equal, round, and reactive to light.   Cardiovascular:      Rate and Rhythm: Normal rate and regular rhythm.      Pulses: Normal pulses.      Heart sounds: Normal heart sounds.   Pulmonary:      Effort: Pulmonary effort is normal.      Breath sounds: Normal breath sounds.   Abdominal:      General: Bowel sounds are normal.      Palpations: Abdomen is soft.   Musculoskeletal:         General: Swelling and tenderness present. Normal range of motion.      Cervical back: Normal range of motion.   Skin:     General: Skin is warm and dry.      Findings: Erythema present.      Comments: Foot wrapped. Did not unwrap   Neurological:      Mental Status: He is alert and oriented to person, place, and time.   Psychiatric:         Behavior: Behavior normal.         Thought Content: Thought content normal.         Significant Labs: All pertinent labs within the past 24 hours have been reviewed.    Significant Imaging: I have reviewed all pertinent imaging results/findings within the past 24 hours.      Assessment/Plan:      * Cellulitis of great toe of right foot  Presents with pain and swelling to right great toe, swelling extends to foot  WBC 16, XRAY with cellulitis  Foot is warm to touch with erythema and edema noted, right great toe with extensive swelling noted, dry calluses under toe.  Given vanc and rocephin in ED  -cont vanc and switch to cefepime/Flagyl  -MRI noted and likely acute osteomyelitis  -Podiatry consulted and appreciate  recommendations  -Pending tissue culture data to tailor antibiotics  - ID re-consulted, now on IV cefepime, vancomycin, and flagyl - now de-escalated to ceftriaxone and PO flagyl. PICC team consulted. Pending final ID recs on end date of abx    Type 2 diabetes mellitus with diabetic peripheral angiopathy without gangrene  Diet controlled  A1C 6.0  accuchecks and SSI      PVD (peripheral vascular disease)  Cont statin      CAD (coronary artery disease)  Cont ASA, statin      Dyslipidemia  Cont statin      HTN (hypertension)  Cont metoprolol  - add Losartan       VTE Risk Mitigation (From admission, onward)         Ordered     enoxaparin injection 40 mg  Daily         01/11/22 0001     IP VTE HIGH RISK PATIENT  Once         01/11/22 0001     Place sequential compression device  Until discontinued         01/11/22 0001                      I have assessed these finding virtually using telemed platform and with assistance of bedside nurse                 The attending portion of this evaluation, treatment, and documentation was performed per Arlene Kurtz MD via Telemedicine AudioVisual using the secure [employer] software platform with 2 way audio/video. The provider was located off-site and the patient is located in the hospital. The aforementioned video software was utilized to document the relevant history and physical exam    Arlene Kurtz MD  Department of Hospital Medicine   [facility]

## 2022-01-16 NOTE — PLAN OF CARE
Patient with right great toe OM - all cultures were negative     PlLan 6 weeks of IV ceftriaxone and PO flagyl     End date - 2-26-22    Need weekly labs CBC, CMP, ESR and CRP. Labs to be faxed to ID office, attention to Dr Cancino, [phone]. Please schedule [facility] follow up 3-4 weeks after discharge. If unable to schedule [facility] - please let [facility] ID service know.     I tis possible that Dr. Woods will not have any open appts in that case he will have to be followed by podiatry and then seen what an appt is available by Dr. Woods     Thanks for the consult ID will sign off

## 2022-01-16 NOTE — PLAN OF CARE
HHIV Abx orders sent/accepted via [employer] fax system by [facility]    HH nurse will contact pt to arrange a visit    [facility] liaison, Ashley Neil met with pt bedside to educate on administration of IV Abx/  IV Abx will be delivered to pt's home today.    Future Appointments   Date Time Provider Department Center   4/5/2022  2:00 PM Yon Asif MD [facility]       Pt's nurse will go over medications/signs and symptoms prior to discharge    No DME ordered       01/16/22 1026   Final Note   Assessment Type Final Discharge Note   Anticipated Discharge Disposition Home-Health  ([facility])   Post-Acute Status   Post-Acute Authorization Home Health;IV Infusion  ([facility])   Home Health Status Set-up Complete/Auth obtained   IV Infusion Status Set-up Complete/Auth obtained   Discharge Delays None known at this time     Gracy Salgado, RN Transitional Navigator  [phone]

## 2022-01-16 NOTE — PROCEDURES
"Santiago Lucia is a 61 y.o. male patient.    Temp: 97.8 °F (36.6 °C) (01/15/22 1529)  Pulse: 69 (01/15/22 1556)  Resp: 18 (01/15/22 1529)  BP: 119/75 (01/15/22 1529)  SpO2: 99 % (01/15/22 1529)  Weight: 80 kg (176 lb 5.9 oz) (01/12/22 2345)  Height: 6' 3" (190.5 cm) (01/11/22 2045)    PICC  Date/Time: 1/15/2022 5:50 PM  Consent Done: Yes  Indications: med administration and vascular access  Anesthesia: local infiltration  Local anesthetic: lidocaine 1% without epinephrine  Anesthetic Total (mL): 2  Preparation: skin prepped with ChloraPrep  Skin prep agent dried: skin prep agent completely dried prior to procedure  Sterile barriers: all five maximum sterile barriers used - cap, mask, sterile gown, sterile gloves, and large sterile sheet  Hand hygiene: hand hygiene performed prior to central venous catheter insertion  Location details: right basilic  Catheter type: double lumen  Catheter size: 5 Fr  Catheter Length: 41cm    Ultrasound guidance: yes  Vessel Caliber: medium and patent, compressibility normal  Needle advanced into vessel with real time Ultrasound guidance.  Guidewire confirmed in vessel.  Sterile sheath used.  Number of attempts: 1  Post-procedure: blood return through all ports, chlorhexidine patch and sterile dressing applied    Assessment: placement verified by x-ray  Complications: none            1/15/2022    "

## 2022-01-16 NOTE — PLAN OF CARE
Plan for possible d/c home tomorrow pending medical stability.  Will f/u with primary team tomorrow for final plan.  HH orders noted, PICC line in place.  Anticipate bedside education tomorrow prior to d/c      KIMBERLY: 1/16/22    Home IV Therapy:  Ceftriaxone 2Gm IV Q 24hrs END DATE: 2/19/22    Home IV Access:  41cm right basilic double lumen PICC    Home Health Agency:  Pending CM referrals    [facility]  Ashley Neil RN Clinical Liaison  Cell: [phone]  Office: [phone]   Fax:  [phone]

## 2022-01-16 NOTE — SUBJECTIVE & OBJECTIVE
Interval History:  Patient still with pain, PRN meds ordered. ID consulted for abx recs, now on IV ceftriaxone and flagyl for presumed OM. Pending final recs for discharge with PICC and IV abx.    Review of Systems   Constitutional: Negative for fever.   Respiratory: Negative for shortness of breath.    Skin: Positive for rash and wound.   Neurological: Positive for numbness. Negative for dizziness and light-headedness.     Objective:     Vital Signs (Most Recent):  Temp: 98.2 °F (36.8 °C) (01/15/22 2021)  Pulse: 77 (01/15/22 2021)  Resp: 20 (01/15/22 2021)  BP: 114/68 (01/15/22 2021)  SpO2: 98 % (01/15/22 2021) Vital Signs (24h Range):  Temp:  [97.3 °F (36.3 °C)-98.7 °F (37.1 °C)] 98.2 °F (36.8 °C)  Pulse:  [63-92] 77  Resp:  [18-20] 20  SpO2:  [96 %-99 %] 98 %  BP: (110-137)/(68-82) 114/68     Weight: 80 kg (176 lb 5.9 oz)  Body mass index is 22.04 kg/m².    Intake/Output Summary (Last 24 hours) at 1/15/2022 2059  Last data filed at 1/15/2022 0500  Gross per 24 hour   Intake --   Output 500 ml   Net -500 ml      Physical Exam  Vitals and nursing note reviewed.   Constitutional:       General: He is not in acute distress.     Appearance: Normal appearance. He is not toxic-appearing.   HENT:      Head: Normocephalic and atraumatic.      Nose: Nose normal.      Mouth/Throat:      Mouth: Mucous membranes are moist.   Eyes:      Pupils: Pupils are equal, round, and reactive to light.   Cardiovascular:      Rate and Rhythm: Normal rate and regular rhythm.      Pulses: Normal pulses.      Heart sounds: Normal heart sounds.   Pulmonary:      Effort: Pulmonary effort is normal.      Breath sounds: Normal breath sounds.   Abdominal:      General: Bowel sounds are normal.      Palpations: Abdomen is soft.   Musculoskeletal:         General: Swelling and tenderness present. Normal range of motion.      Cervical back: Normal range of motion.   Skin:     General: Skin is warm and dry.      Findings: Erythema present.       Comments: Foot wrapped. Did not unwrap   Neurological:      Mental Status: He is alert and oriented to person, place, and time.   Psychiatric:         Behavior: Behavior normal.         Thought Content: Thought content normal.         Significant Labs: All pertinent labs within the past 24 hours have been reviewed.    Significant Imaging: I have reviewed all pertinent imaging results/findings within the past 24 hours.

## 2022-01-16 NOTE — NURSING
Discharge instructions reviewed and pt and wife both verbalize understanding of all written and verbal instruction. IVabx/PICC line care provided per [facility] RN and understanding adequate for discharge. AVS given to pt. PICC line left in place for d/c w/ home abx. RLE dressing removed per MD order, cleansed w/ sterile normal saline and dressing replaced per orders w/ NS soaked hydrafera, dry gauze, kerlix, cast padding and wrapped w/ wide coban. NAD noted. Pt awaiting wheelchair transport by pt escort.

## 2022-01-16 NOTE — ASSESSMENT & PLAN NOTE
Presents with pain and swelling to right great toe, swelling extends to foot  WBC 16, XRAY with cellulitis  Foot is warm to touch with erythema and edema noted, right great toe with extensive swelling noted, dry calluses under toe.  Given vanc and rocephin in ED  -cont vanc and switch to cefepime/Flagyl  -MRI noted and likely acute osteomyelitis  -Podiatry consulted and appreciate recommendations  -Pending tissue culture data to tailor antibiotics  - ID re-consulted, now on IV cefepime, vancomycin, and flagyl - now de-escalated to ceftriaxone and PO flagyl. PICC team consulted. Pending final ID recs on end date of abx

## 2022-01-16 NOTE — PLAN OF CARE
[facility] educator met with pt and wife Adwoa, discussed discharge plan for home IVABX. Santiago Lucia will dc home with family support. Patient will infuse medication via elastomeric pump.   Educated on S.A.S.H procedure. S.A.S.H mat provided.  Patient education checklist reviewed and acknowledged, agreeable to discharge with home infusion plan of care. IV administration process using aspetic technique was reviewed with successful return demonstration. Patient feels comfortable with infusion. Patient will dc home with Ceftriaxone 2GM IV Q 24 hours for estimated end of therapy date 2/24/22. Dosing schedule times are 10AM.  Extension to be placed per HH nurse tomorrow. [facility] will follow patient for weekly dressing changes and lab draws. Time allotted for questions. Patients nurse and case management team notified teaching has been completed.     Medication delivery will be made to home following d/c..    Patient accepted to care by [facility] for start of care tomorrow.    [facility]  Ashley Neil RN Clinical Liaison  Cell: [phone]  Office: [phone]   Fax:  [phone]

## 2022-01-17 LAB
BACTERIA SPEC AEROBE CULT: NO GROWTH
BACTERIA SPEC ANAEROBE CULT: ABNORMAL

## 2022-01-17 PROCEDURE — G0180 MD CERTIFICATION HHA PATIENT: HCPCS | Mod: ,,, | Performed by: STUDENT IN AN ORGANIZED HEALTH CARE EDUCATION/TRAINING PROGRAM

## 2022-01-17 PROCEDURE — G0180 PR HOME HEALTH MD CERTIFICATION: ICD-10-PCS | Mod: ,,, | Performed by: STUDENT IN AN ORGANIZED HEALTH CARE EDUCATION/TRAINING PROGRAM

## 2022-01-18 ENCOUNTER — PATIENT MESSAGE (OUTPATIENT)
Dept: PODIATRY | Facility: CLINIC | Age: 62
End: 2022-01-18
Payer: COMMERCIAL

## 2022-01-18 ENCOUNTER — LAB VISIT (OUTPATIENT)
Dept: LAB | Facility: HOSPITAL | Age: 62
End: 2022-01-18
Attending: STUDENT IN AN ORGANIZED HEALTH CARE EDUCATION/TRAINING PROGRAM
Payer: COMMERCIAL

## 2022-01-18 DIAGNOSIS — L03.031 ABSCESS OF FIFTH TOENAIL OF RIGHT FOOT: ICD-10-CM

## 2022-01-18 PROCEDURE — 85027 COMPLETE CBC AUTOMATED: CPT | Performed by: STUDENT IN AN ORGANIZED HEALTH CARE EDUCATION/TRAINING PROGRAM

## 2022-01-18 PROCEDURE — 80053 COMPREHEN METABOLIC PANEL: CPT | Performed by: STUDENT IN AN ORGANIZED HEALTH CARE EDUCATION/TRAINING PROGRAM

## 2022-01-18 PROCEDURE — 86140 C-REACTIVE PROTEIN: CPT | Performed by: STUDENT IN AN ORGANIZED HEALTH CARE EDUCATION/TRAINING PROGRAM

## 2022-01-18 PROCEDURE — 85652 RBC SED RATE AUTOMATED: CPT | Performed by: STUDENT IN AN ORGANIZED HEALTH CARE EDUCATION/TRAINING PROGRAM

## 2022-01-19 DIAGNOSIS — L97.512 ULCER OF GREAT TOE, RIGHT, WITH FAT LAYER EXPOSED: Primary | ICD-10-CM

## 2022-01-19 LAB
ALBUMIN SERPL BCP-MCNC: 3.9 G/DL (ref 3.5–5.2)
ALP SERPL-CCNC: 88 U/L (ref 55–135)
ALT SERPL W/O P-5'-P-CCNC: 91 U/L (ref 10–44)
ANION GAP SERPL CALC-SCNC: 12 MMOL/L (ref 8–16)
AST SERPL-CCNC: 75 U/L (ref 10–40)
BILIRUB SERPL-MCNC: 0.2 MG/DL (ref 0.1–1)
BUN SERPL-MCNC: 22 MG/DL (ref 8–23)
CALCIUM SERPL-MCNC: 10 MG/DL (ref 8.7–10.5)
CHLORIDE SERPL-SCNC: 104 MMOL/L (ref 95–110)
CO2 SERPL-SCNC: 22 MMOL/L (ref 23–29)
CREAT SERPL-MCNC: 1 MG/DL (ref 0.5–1.4)
CRP SERPL-MCNC: 8.4 MG/L (ref 0–8.2)
ERYTHROCYTE [DISTWIDTH] IN BLOOD BY AUTOMATED COUNT: 13.9 % (ref 11.5–14.5)
ERYTHROCYTE [SEDIMENTATION RATE] IN BLOOD BY WESTERGREN METHOD: 45 MM/HR (ref 0–23)
EST. GFR  (AFRICAN AMERICAN): >60 ML/MIN/1.73 M^2
EST. GFR  (NON AFRICAN AMERICAN): >60 ML/MIN/1.73 M^2
GLUCOSE SERPL-MCNC: 100 MG/DL (ref 70–110)
HCT VFR BLD AUTO: 42.7 % (ref 40–54)
HGB BLD-MCNC: 13.6 G/DL (ref 14–18)
MCH RBC QN AUTO: 28.3 PG (ref 27–31)
MCHC RBC AUTO-ENTMCNC: 31.9 G/DL (ref 32–36)
MCV RBC AUTO: 89 FL (ref 82–98)
PLATELET # BLD AUTO: 259 K/UL (ref 150–450)
PMV BLD AUTO: 10.6 FL (ref 9.2–12.9)
POTASSIUM SERPL-SCNC: 4.7 MMOL/L (ref 3.5–5.1)
PROT SERPL-MCNC: 7.1 G/DL (ref 6–8.4)
RBC # BLD AUTO: 4.81 M/UL (ref 4.6–6.2)
SODIUM SERPL-SCNC: 138 MMOL/L (ref 136–145)
WBC # BLD AUTO: 13.56 K/UL (ref 3.9–12.7)

## 2022-01-24 ENCOUNTER — LAB VISIT (OUTPATIENT)
Dept: LAB | Facility: HOSPITAL | Age: 62
End: 2022-01-24
Attending: STUDENT IN AN ORGANIZED HEALTH CARE EDUCATION/TRAINING PROGRAM
Payer: COMMERCIAL

## 2022-01-24 DIAGNOSIS — E11.51 TYPE II DIABETES MELLITUS WITH PERIPHERAL CIRCULATORY DISORDER: Primary | ICD-10-CM

## 2022-01-24 LAB — BACTERIA SPEC ANAEROBE CULT: NORMAL

## 2022-01-24 PROCEDURE — 85652 RBC SED RATE AUTOMATED: CPT | Performed by: STUDENT IN AN ORGANIZED HEALTH CARE EDUCATION/TRAINING PROGRAM

## 2022-01-24 PROCEDURE — 80053 COMPREHEN METABOLIC PANEL: CPT | Performed by: STUDENT IN AN ORGANIZED HEALTH CARE EDUCATION/TRAINING PROGRAM

## 2022-01-24 PROCEDURE — 86140 C-REACTIVE PROTEIN: CPT | Performed by: STUDENT IN AN ORGANIZED HEALTH CARE EDUCATION/TRAINING PROGRAM

## 2022-01-24 PROCEDURE — 85027 COMPLETE CBC AUTOMATED: CPT | Performed by: STUDENT IN AN ORGANIZED HEALTH CARE EDUCATION/TRAINING PROGRAM

## 2022-01-25 ENCOUNTER — PATIENT MESSAGE (OUTPATIENT)
Dept: PODIATRY | Facility: CLINIC | Age: 62
End: 2022-01-25

## 2022-01-25 ENCOUNTER — OFFICE VISIT (OUTPATIENT)
Dept: PODIATRY | Facility: CLINIC | Age: 62
End: 2022-01-25
Payer: COMMERCIAL

## 2022-01-25 VITALS
DIASTOLIC BLOOD PRESSURE: 82 MMHG | BODY MASS INDEX: 21.88 KG/M2 | SYSTOLIC BLOOD PRESSURE: 124 MMHG | HEIGHT: 75 IN | WEIGHT: 176 LBS | HEART RATE: 75 BPM

## 2022-01-25 DIAGNOSIS — M20.30 HALLUX MALLEUS, UNSPECIFIED LATERALITY: ICD-10-CM

## 2022-01-25 DIAGNOSIS — E11.51 TYPE II DIABETES MELLITUS WITH PERIPHERAL CIRCULATORY DISORDER: ICD-10-CM

## 2022-01-25 DIAGNOSIS — L97.512 ULCER OF GREAT TOE, RIGHT, WITH FAT LAYER EXPOSED: Primary | ICD-10-CM

## 2022-01-25 DIAGNOSIS — M86.9 OSTEOMYELITIS, UNSPECIFIED SITE, UNSPECIFIED TYPE: ICD-10-CM

## 2022-01-25 DIAGNOSIS — E11.42 POLYNEUROPATHY DUE TO TYPE 2 DIABETES MELLITUS: ICD-10-CM

## 2022-01-25 LAB
ALBUMIN SERPL BCP-MCNC: 3.8 G/DL (ref 3.5–5.2)
ALP SERPL-CCNC: 82 U/L (ref 55–135)
ALT SERPL W/O P-5'-P-CCNC: 44 U/L (ref 10–44)
ANION GAP SERPL CALC-SCNC: 13 MMOL/L (ref 8–16)
AST SERPL-CCNC: 28 U/L (ref 10–40)
BILIRUB SERPL-MCNC: 0.3 MG/DL (ref 0.1–1)
BUN SERPL-MCNC: 19 MG/DL (ref 8–23)
CALCIUM SERPL-MCNC: 9.7 MG/DL (ref 8.7–10.5)
CHLORIDE SERPL-SCNC: 104 MMOL/L (ref 95–110)
CO2 SERPL-SCNC: 22 MMOL/L (ref 23–29)
CREAT SERPL-MCNC: 1.2 MG/DL (ref 0.5–1.4)
CRP SERPL-MCNC: 1.7 MG/L (ref 0–8.2)
ERYTHROCYTE [DISTWIDTH] IN BLOOD BY AUTOMATED COUNT: 13.6 % (ref 11.5–14.5)
ERYTHROCYTE [SEDIMENTATION RATE] IN BLOOD BY WESTERGREN METHOD: 23 MM/HR (ref 0–23)
EST. GFR  (AFRICAN AMERICAN): >60 ML/MIN/1.73 M^2
EST. GFR  (NON AFRICAN AMERICAN): >60 ML/MIN/1.73 M^2
GLUCOSE SERPL-MCNC: 92 MG/DL (ref 70–110)
HCT VFR BLD AUTO: 41.7 % (ref 40–54)
HGB BLD-MCNC: 13.1 G/DL (ref 14–18)
MCH RBC QN AUTO: 27.7 PG (ref 27–31)
MCHC RBC AUTO-ENTMCNC: 31.4 G/DL (ref 32–36)
MCV RBC AUTO: 88 FL (ref 82–98)
PLATELET # BLD AUTO: 273 K/UL (ref 150–450)
PMV BLD AUTO: 10.6 FL (ref 9.2–12.9)
POTASSIUM SERPL-SCNC: 4.4 MMOL/L (ref 3.5–5.1)
PROT SERPL-MCNC: 7.1 G/DL (ref 6–8.4)
RBC # BLD AUTO: 4.73 M/UL (ref 4.6–6.2)
SODIUM SERPL-SCNC: 139 MMOL/L (ref 136–145)
WBC # BLD AUTO: 12.42 K/UL (ref 3.9–12.7)

## 2022-01-25 PROCEDURE — 3008F PR BODY MASS INDEX (BMI) DOCUMENTED: ICD-10-PCS | Mod: CPTII,S$GLB,, | Performed by: STUDENT IN AN ORGANIZED HEALTH CARE EDUCATION/TRAINING PROGRAM

## 2022-01-25 PROCEDURE — 99999 PR PBB SHADOW E&M-EST. PATIENT-LVL V: CPT | Mod: PBBFAC,,, | Performed by: STUDENT IN AN ORGANIZED HEALTH CARE EDUCATION/TRAINING PROGRAM

## 2022-01-25 PROCEDURE — 3044F PR MOST RECENT HEMOGLOBIN A1C LEVEL <7.0%: ICD-10-PCS | Mod: CPTII,S$GLB,, | Performed by: STUDENT IN AN ORGANIZED HEALTH CARE EDUCATION/TRAINING PROGRAM

## 2022-01-25 PROCEDURE — 99214 OFFICE O/P EST MOD 30 MIN: CPT | Mod: 25,S$GLB,, | Performed by: STUDENT IN AN ORGANIZED HEALTH CARE EDUCATION/TRAINING PROGRAM

## 2022-01-25 PROCEDURE — 3079F PR MOST RECENT DIASTOLIC BLOOD PRESSURE 80-89 MM HG: ICD-10-PCS | Mod: CPTII,S$GLB,, | Performed by: STUDENT IN AN ORGANIZED HEALTH CARE EDUCATION/TRAINING PROGRAM

## 2022-01-25 PROCEDURE — 11042 DBRDMT SUBQ TIS 1ST 20SQCM/<: CPT | Mod: S$GLB,,, | Performed by: STUDENT IN AN ORGANIZED HEALTH CARE EDUCATION/TRAINING PROGRAM

## 2022-01-25 PROCEDURE — 1111F PR DISCHARGE MEDS RECONCILED W/ CURRENT OUTPATIENT MED LIST: ICD-10-PCS | Mod: CPTII,S$GLB,, | Performed by: STUDENT IN AN ORGANIZED HEALTH CARE EDUCATION/TRAINING PROGRAM

## 2022-01-25 PROCEDURE — 3074F PR MOST RECENT SYSTOLIC BLOOD PRESSURE < 130 MM HG: ICD-10-PCS | Mod: CPTII,S$GLB,, | Performed by: STUDENT IN AN ORGANIZED HEALTH CARE EDUCATION/TRAINING PROGRAM

## 2022-01-25 PROCEDURE — 4010F ACE/ARB THERAPY RXD/TAKEN: CPT | Mod: CPTII,S$GLB,, | Performed by: STUDENT IN AN ORGANIZED HEALTH CARE EDUCATION/TRAINING PROGRAM

## 2022-01-25 PROCEDURE — 1159F PR MEDICATION LIST DOCUMENTED IN MEDICAL RECORD: ICD-10-PCS | Mod: CPTII,S$GLB,, | Performed by: STUDENT IN AN ORGANIZED HEALTH CARE EDUCATION/TRAINING PROGRAM

## 2022-01-25 PROCEDURE — 4010F PR ACE/ARB THEARPY RXD/TAKEN: ICD-10-PCS | Mod: CPTII,S$GLB,, | Performed by: STUDENT IN AN ORGANIZED HEALTH CARE EDUCATION/TRAINING PROGRAM

## 2022-01-25 PROCEDURE — 99214 PR OFFICE/OUTPT VISIT, EST, LEVL IV, 30-39 MIN: ICD-10-PCS | Mod: 25,S$GLB,, | Performed by: STUDENT IN AN ORGANIZED HEALTH CARE EDUCATION/TRAINING PROGRAM

## 2022-01-25 PROCEDURE — 99999 PR PBB SHADOW E&M-EST. PATIENT-LVL V: ICD-10-PCS | Mod: PBBFAC,,, | Performed by: STUDENT IN AN ORGANIZED HEALTH CARE EDUCATION/TRAINING PROGRAM

## 2022-01-25 PROCEDURE — 3008F BODY MASS INDEX DOCD: CPT | Mod: CPTII,S$GLB,, | Performed by: STUDENT IN AN ORGANIZED HEALTH CARE EDUCATION/TRAINING PROGRAM

## 2022-01-25 PROCEDURE — 3074F SYST BP LT 130 MM HG: CPT | Mod: CPTII,S$GLB,, | Performed by: STUDENT IN AN ORGANIZED HEALTH CARE EDUCATION/TRAINING PROGRAM

## 2022-01-25 PROCEDURE — 1111F DSCHRG MED/CURRENT MED MERGE: CPT | Mod: CPTII,S$GLB,, | Performed by: STUDENT IN AN ORGANIZED HEALTH CARE EDUCATION/TRAINING PROGRAM

## 2022-01-25 PROCEDURE — 11042 WOUND DEBRIDEMENT: ICD-10-PCS | Mod: S$GLB,,, | Performed by: STUDENT IN AN ORGANIZED HEALTH CARE EDUCATION/TRAINING PROGRAM

## 2022-01-25 PROCEDURE — 1159F MED LIST DOCD IN RCRD: CPT | Mod: CPTII,S$GLB,, | Performed by: STUDENT IN AN ORGANIZED HEALTH CARE EDUCATION/TRAINING PROGRAM

## 2022-01-25 PROCEDURE — 3044F HG A1C LEVEL LT 7.0%: CPT | Mod: CPTII,S$GLB,, | Performed by: STUDENT IN AN ORGANIZED HEALTH CARE EDUCATION/TRAINING PROGRAM

## 2022-01-25 PROCEDURE — 3079F DIAST BP 80-89 MM HG: CPT | Mod: CPTII,S$GLB,, | Performed by: STUDENT IN AN ORGANIZED HEALTH CARE EDUCATION/TRAINING PROGRAM

## 2022-01-25 NOTE — PROCEDURES
Wound Debridement    Date/Time: 1/25/2022 11:00 AM  Performed by: Jackie Calderon DPM  Authorized by: Jackie Calderon DPM     Consent Done?:  Yes (Verbal)  Local anesthesia used?: No      Wound Details:    Location:  Right foot    Location:  Right 1st Toe    Type of Debridement:  Excisional       Length (cm):  1.8       Area (sq cm):  2.16       Width (cm):  1.2       Percent Debrided (%):  100       Depth (cm):  0.2       Total Area Debrided (sq cm):  2.16    Depth of debridement:  Subcutaneous tissue    Tissue debrided:  Subcutaneous and Other    Devitalized tissue debrided:  Biofilm, Callus and Fibrin    Instruments:  Blade and Curette    Bleeding:  Minimal  Patient tolerance:  Patient tolerated the procedure well with no immediate complications     No cultures were taken during this visit

## 2022-01-25 NOTE — PROGRESS NOTES
Subjective:      Patient ID: Santiago Lucia is a 61 y.o. male.    Chief Complaint: Wound Check (Right foot wound)    Santiago is a 61 y.o. male who presents to the clinic for evaluation and treatment of high risk feet. Santiago has a past medical history of Avascular necrosis of bone of hip, left (10/30/2018), Coronary artery disease, DM w/o complication type II (8/9/2013), and NSTEMI (non-ST elevated myocardial infarction) (01/2013). The patient's chief complaint is foot ulcer, right foot. This patient has documented high risk feet requiring routine maintenance secondary to peripheral neuropathy. Recent admission for abscess and recent MRI positive for osteomyelitis. Currently treated with 6 week of antibiotics per Infectious Disease recommendation. Relates to mild pain. No other pedal complaints.     PCP: Yon Asif MD    Date Last Seen by PCP:     Current shoe gear:  Affected Foot: football with tall boot.      Unaffected Foot: Tennis shoes    History of Trauma: negative  Sign of Infection: none    Hemoglobin A1C   Date Value Ref Range Status   01/11/2022 6.0 (H) 4.0 - 5.6 % Final     Comment:     ADA Screening Guidelines:  5.7-6.4%  Consistent with prediabetes  >or=6.5%  Consistent with diabetes    High levels of fetal hemoglobin interfere with the HbA1C  assay. Heterozygous hemoglobin variants (HbS, HgC, etc)do  not significantly interfere with this assay.   However, presence of multiple variants may affect accuracy.     07/30/2021 6.0 (H) 4.0 - 5.6 % Final     Comment:     ADA Screening Guidelines:  5.7-6.4%  Consistent with prediabetes  >or=6.5%  Consistent with diabetes    High levels of fetal hemoglobin interfere with the HbA1C  assay. Heterozygous hemoglobin variants (HbS, HgC, etc)do  not significantly interfere with this assay.   However, presence of multiple variants may affect accuracy.     01/16/2013 7.1 (H) 4.0 - 6.2 % Final       Review of Systems   Constitutional: Negative for chills,  decreased appetite, diaphoresis and fever.   HENT: Negative for congestion and hearing loss.    Cardiovascular: Negative for chest pain, claudication, leg swelling and syncope.   Respiratory: Negative for cough and shortness of breath.    Skin: Positive for dry skin, nail changes and poor wound healing. Negative for color change, flushing, itching and rash.   Musculoskeletal: Positive for arthritis, back pain and joint pain. Negative for joint swelling.   Gastrointestinal: Negative for nausea and vomiting.   Neurological: Positive for numbness. Negative for paresthesias.   Psychiatric/Behavioral: Negative for altered mental status. The patient is not nervous/anxious.            Objective:      Physical Exam  Constitutional:       General: He is not in acute distress.     Appearance: Normal appearance. He is well-developed. He is not diaphoretic.   Cardiovascular:      Comments: Dorsalis pedis and posterior tibial pulses are mildly diminished. Skin temperature is within normal limits. Toes are cool to touch and feet are warm proximally. Hair growth is diminished. Skin is atrophic and with mild hyperpigmentation. No edema noted, bilaterally.   Musculoskeletal:         General: No tenderness.      Comments: Adequate joint range of motion without pain, limitation, nor crepitation to foot and ankle joints. Muscle strength is 5/5 in all groups bilaterally.    Hallux malleus to right great toe   Feet:      Right foot:      Skin integrity: Dry skin present. No ulcer, blister, erythema or warmth.      Left foot:      Skin integrity: Dry skin present. No ulcer, blister, erythema or warmth.   Skin:     General: Skin is warm and dry.      Capillary Refill: Capillary refill takes less than 2 seconds.      Comments: Skin is warm and dry, no acute signs of infection noted bilaterally      Toenails are thickened by 2-4 mm's, dystrophic, and are darkened in coloration with subungual fungal debris.     Ulcer to dorsal right hallux,  see wound description below.     Otherwise, no open wounds, macerations or hyperkeratotic lesions, bilaterally            Neurological:      Mental Status: He is alert and oriented to person, place, and time.      Sensory: Sensory deficit present.      Motor: No abnormal muscle tone.      Comments: Light touch within normal limits.    Psychiatric:         Mood and Affect: Mood normal.         Behavior: Behavior normal.         Thought Content: Thought content normal.       S/p wound debridement with granular base, to level of subcutaneous layer, resolving edema noted. No purulence or acute signs of infection, appears stable.                   Assessment:       Encounter Diagnoses   Name Primary?    Osteomyelitis, unspecified site, unspecified type     Ulcer of great toe, right, with fat layer exposed Yes    Hallux malleus, unspecified laterality     Polyneuropathy due to type 2 diabetes mellitus     Type II diabetes mellitus with peripheral circulatory disorder          Plan:       Santiago was seen today for wound check.    Diagnoses and all orders for this visit:    Ulcer of great toe, right, with fat layer exposed    Osteomyelitis, unspecified site, unspecified type  -     Ambulatory referral/consult to Podiatry    Hallux malleus, unspecified laterality    Polyneuropathy due to type 2 diabetes mellitus    Type II diabetes mellitus with peripheral circulatory disorder      I counseled the patient on his conditions, their implications and medical management.    -Wound debridement performed, see procedure note. Dressed with hydrafera and football of matthew foam, cast padding and light coban. Secured in tall boot. Patient states he does not like wearing previous forefoot offloading shoe previously dispensed. Ok with boot for now.     -Home health orders updated as follows:    Remove foot dressings.   Cleanse wound with normal saline or wound . Dry well.    Apply gentian violet to chad wound maceration if  needed.   Apply saline moistened hydrafera classic to 1 wound(s).   Next apply foam pad x 2 directly on top of wound bed   Place cast padding between toes to prevent pressure ulcers .  Top with football dressing consisting 3 rolls of cast padding. Top with flexinet.  Change 3  times per week    -Continue antibiotics per Infectious Disease recommendations    -Rest, elevate. Strict offloading to wound site.     -RTC in 1-2 weeks, sooner PRN

## 2022-01-27 ENCOUNTER — PATIENT MESSAGE (OUTPATIENT)
Dept: PODIATRY | Facility: CLINIC | Age: 62
End: 2022-01-27
Payer: COMMERCIAL

## 2022-01-31 ENCOUNTER — LAB VISIT (OUTPATIENT)
Dept: LAB | Facility: HOSPITAL | Age: 62
End: 2022-01-31
Attending: STUDENT IN AN ORGANIZED HEALTH CARE EDUCATION/TRAINING PROGRAM
Payer: COMMERCIAL

## 2022-01-31 DIAGNOSIS — E11.51 TYPE II DIABETES MELLITUS WITH PERIPHERAL CIRCULATORY DISORDER: Primary | ICD-10-CM

## 2022-01-31 LAB
ALBUMIN SERPL BCP-MCNC: 4.2 G/DL (ref 3.5–5.2)
ALP SERPL-CCNC: 76 U/L (ref 55–135)
ALT SERPL W/O P-5'-P-CCNC: 73 U/L (ref 10–44)
ANION GAP SERPL CALC-SCNC: 13 MMOL/L (ref 8–16)
AST SERPL-CCNC: 40 U/L (ref 10–40)
BILIRUB SERPL-MCNC: 0.4 MG/DL (ref 0.1–1)
BUN SERPL-MCNC: 18 MG/DL (ref 8–23)
CALCIUM SERPL-MCNC: 9.7 MG/DL (ref 8.7–10.5)
CHLORIDE SERPL-SCNC: 102 MMOL/L (ref 95–110)
CO2 SERPL-SCNC: 21 MMOL/L (ref 23–29)
CREAT SERPL-MCNC: 0.9 MG/DL (ref 0.5–1.4)
CRP SERPL-MCNC: 1 MG/L (ref 0–8.2)
ERYTHROCYTE [DISTWIDTH] IN BLOOD BY AUTOMATED COUNT: 13.9 % (ref 11.5–14.5)
ERYTHROCYTE [SEDIMENTATION RATE] IN BLOOD BY WESTERGREN METHOD: 14 MM/HR (ref 0–23)
EST. GFR  (AFRICAN AMERICAN): >60 ML/MIN/1.73 M^2
EST. GFR  (NON AFRICAN AMERICAN): >60 ML/MIN/1.73 M^2
GLUCOSE SERPL-MCNC: 96 MG/DL (ref 70–110)
HCT VFR BLD AUTO: 43.8 % (ref 40–54)
HGB BLD-MCNC: 13.8 G/DL (ref 14–18)
MCH RBC QN AUTO: 28.2 PG (ref 27–31)
MCHC RBC AUTO-ENTMCNC: 31.5 G/DL (ref 32–36)
MCV RBC AUTO: 89 FL (ref 82–98)
PLATELET # BLD AUTO: 283 K/UL (ref 150–450)
PMV BLD AUTO: 10.9 FL (ref 9.2–12.9)
POTASSIUM SERPL-SCNC: 4.4 MMOL/L (ref 3.5–5.1)
PROT SERPL-MCNC: 7.3 G/DL (ref 6–8.4)
RBC # BLD AUTO: 4.9 M/UL (ref 4.6–6.2)
SODIUM SERPL-SCNC: 136 MMOL/L (ref 136–145)
WBC # BLD AUTO: 8.91 K/UL (ref 3.9–12.7)

## 2022-01-31 PROCEDURE — 86140 C-REACTIVE PROTEIN: CPT | Performed by: STUDENT IN AN ORGANIZED HEALTH CARE EDUCATION/TRAINING PROGRAM

## 2022-01-31 PROCEDURE — 85652 RBC SED RATE AUTOMATED: CPT | Performed by: STUDENT IN AN ORGANIZED HEALTH CARE EDUCATION/TRAINING PROGRAM

## 2022-01-31 PROCEDURE — 80053 COMPREHEN METABOLIC PANEL: CPT | Performed by: STUDENT IN AN ORGANIZED HEALTH CARE EDUCATION/TRAINING PROGRAM

## 2022-01-31 PROCEDURE — 85027 COMPLETE CBC AUTOMATED: CPT | Performed by: STUDENT IN AN ORGANIZED HEALTH CARE EDUCATION/TRAINING PROGRAM

## 2022-02-04 NOTE — DISCHARGE SUMMARY
[facility] Medicine  Discharge Summary      Patient Name: Santiago Luica  MRN: [id number]  Patient Class: IP- Inpatient  Admission Date: 1/10/2022  Hospital Length of Stay: 4 days  Discharge Date and Time: 1/16/2022  2:12 PM  Attending Physician: No att. providers found   Discharging Provider: Arlene Kurtz MD  Primary Care Provider: Yon Asif MD      HPI:   Santiago Lucia is a 60 yo male with a pmh of CAD, NSTEMI, HTN, PVD, gout. He presented with worsening pain and swelling of right great toe x 3 days. Today he noted his toe was throbbing and his entire right foot had swollen. He has been seeing Dr Stevenson with podiatry for chronic issues with the toe including callus formations. He denies drainage to the toe. He reports history of gout with 3 attacks in the past. He denies having diabetes and does not take any meds for this. No chills or fevers, no nausea. No other complaints.     In the ED, he was noted to be tachycardic and had WBC 16. Foot XRAY with cellulitis.       * No surgery found *      Hospital Course:   Presents with pain and swelling to right great toe, swelling extends to foot with WBC 16, XRAY with cellulitis. Patient continued on vanc and switch to cefepime/Flagyl. MRI noted and likely acute osteomyelitis. Podiatry consulted and appreciate recommendations, pending tissue culture data to tailor antibiotics. ID re-consulted, now on IV cefepime, vancomycin, and flagyl - now de-escalated to ceftriaxone and PO flagyl. PICC team consulted. Pending final ID recs on end date of abx and discharged home to complete therapy in stable condition with ID and podiatry follow up        Goals of Care Treatment Preferences:  Code Status: Full Code      Consults:   Consults (From admission, onward)        Status Ordering Provider     Inpatient consult to Infectious Diseases  Once        Provider:  MD Lena Zhao MADHUMATI G.     Inpatient virtual consult to  Hospital Medicine  Once        Provider:  (Not yet assigned)    Completed YOVANNY DOWLING     Inpatient virtual consult to Hospital Medicine  Once        Provider:  (Not yet assigned)    Completed YOVANNY DOWLING     Inpatient consult to Podiatry  Once        Provider:  (Not yet assigned)    Completed GREGOR ROBERTS          * Cellulitis of great toe of right foot  Presents with pain and swelling to right great toe, swelling extends to foot  WBC 16, XRAY with cellulitis  Foot is warm to touch with erythema and edema noted, right great toe with extensive swelling noted, dry calluses under toe.  Given vanc and rocephin in ED  -cont vanc and switch to cefepime/Flagyl  -MRI noted and likely acute osteomyelitis  -Podiatry consulted and appreciate recommendations  -Pending tissue culture data to tailor antibiotics  - ID re-consulted, now on IV cefepime, vancomycin, and flagyl - now de-escalated to ceftriaxone and PO flagyl. PICC team consulted. Pending final ID recs on end date of abx    Type 2 diabetes mellitus with diabetic peripheral angiopathy without gangrene  Diet controlled  A1C 6.0  accuchecks and SSI      PVD (peripheral vascular disease)  Cont statin      CAD (coronary artery disease)  Cont ASA, statin      Dyslipidemia  Cont statin      HTN (hypertension)  Cont metoprolol  - add Losartan       Final Active Diagnoses:    Diagnosis Date Noted POA    PRINCIPAL PROBLEM:  Cellulitis of great toe of right foot [L03.031] 01/11/2022 Yes    Type 2 diabetes mellitus with diabetic peripheral angiopathy without gangrene [E11.51] 08/04/2020 Yes    PVD (peripheral vascular disease) [I73.9] 10/26/2017 Yes    HTN (hypertension) [I10] 01/17/2013 Yes    Dyslipidemia [E78.5] 01/17/2013 Yes    CAD (coronary artery disease) [I25.10] 01/17/2013 Yes      Problems Resolved During this Admission:       Discharged Condition: stable    Disposition: Home or Self Care    Follow Up:   Follow-up Information      [facility].    Specialty: Home Health Services  Why: Home Health nurse will contact patient to arrange a visit  Contact information:  [address]  [address]  [place] [address]  [phone]             Schedule an appointment as soon as possible for a visit with Yon Asif MD.    Specialty: Internal Medicine  Why: Follow-Up / Offices closed for weekend. Patient to schedule own follow up appointment.    Contact information:  [address]  [address]  [place] [id number] [phone]             [facility].    Why: Infusion Therapy: Provider of home IV Abx  Contact information:  [address] [place] [address]  Phone: [phone]    Provider of home IV Abx             Schedule an appointment as soon as possible for a visit with Juan Monterroso MD.    Specialty: Infectious Diseases  Why: Infectious Disease Follow Up / Offices closed for weekend. Patient to schedule own follow up appointment.    Contact information:  [address] [facility] [id number] [phone]                       Patient Instructions:      Ambulatory referral/consult to Podiatry   Standing Status: Future   Referral Priority: Routine Referral Type: Consultation   Referral Reason: Specialty Services Required   Requested Specialty: Podiatry   Number of Visits Requested: 1     Ambulatory referral/consult to Infectious Disease   Standing Status: Future   Referral Priority: Routine Referral Type: Consultation   Referral Reason: Specialty Services Required   Requested Specialty: Infectious Diseases   Number of Visits Requested: 1       Significant Diagnostic Studies: Microbiology:   Blood Culture   Lab Results   Component Value Date    LABBLOO No growth after 5 days. 01/11/2022    LABBLOO No growth after 5 days. 01/11/2022       Pending Diagnostic Studies:     None         Medications:  Reconciled Home Medications:      Medication List      START taking these medications     cefTRIAXone 2 g/50 mL Pgbk IVPB  Commonly known as: ROCEPHIN  Inject 50 mLs (2 g total) into the vein once daily.     losartan 25 MG tablet  Commonly known as: COZAAR  Take 1 tablet (25 mg total) by mouth once daily.     metroNIDAZOLE 500 MG tablet  Commonly known as: FLAGYL  Take 1 tablet (500 mg total) by mouth every 8 (eight) hours.        CONTINUE taking these medications    ascorbic acid (vitamin C) 500 MG tablet  Commonly known as: VITAMIN C  Take 500 mg by mouth once daily.     aspirin 81 MG EC tablet  Commonly known as: ECOTRIN  Take 1 tablet (81 mg total) by mouth once daily.     COLLAGEN MISC  Take 1 tablet by mouth once daily.     diclofenac 75 MG EC tablet  Commonly known as: VOLTAREN  Take 1 tablet (75 mg total) by mouth 2 (two) times daily.     magnesium oxide 400 mg (241.3 mg magnesium) tablet  Commonly known as: MAG-OX  Take 400 mg by mouth once daily.     metoprolol succinate 25 MG 24 hr tablet  Commonly known as: TOPROL-XL  TAKE 1 TABLET(25 MG) BY MOUTH EVERY DAY     rosuvastatin 10 MG tablet  Commonly known as: CRESTOR  TAKE 1 TABLET(10 MG) BY MOUTH EVERY DAY     tadalafiL 20 MG Tab  Commonly known as: CIALIS  Take 1 tablet (20 mg total) by mouth as needed.     vitamin D 1000 units Tab  Commonly known as: VITAMIN D3  Take 1,000 Units by mouth once daily.     zinc sulfate 50 mg zinc (220 mg) capsule  Commonly known as: ZINCATE  Take 220 mg by mouth once daily.        STOP taking these medications    HYDROcodone-acetaminophen 7.5-325 mg per tablet  Commonly known as: NORCO            Indwelling Lines/Drains at time of discharge:   Lines/Drains/Airways     Peripherally Inserted Central Catheter Line            PICC Double Lumen 01/15/22 1750 right basilic 19 days                Time spent on the discharge of patient: > 35 minutes         The attending portion of this evaluation, treatment, and documentation was performed per Arlene Kurtz MD via Telemedicine AudioVisual using the [employer]  software platform with 2 way audio/video. The provider was located off-site and the patient is located in the hospital. The aforementioned video software was utilized to document the relevant history and physical exam    Arlene Kurtz MD  Department of [facility] Medicine  [facility]

## 2022-02-04 NOTE — HOSPITAL COURSE
Presents with pain and swelling to right great toe, swelling extends to foot with WBC 16, XRAY with cellulitis. Patient continued on vanc and switch to cefepime/Flagyl. MRI noted and likely acute osteomyelitis. Podiatry consulted and appreciate recommendations, pending tissue culture data to tailor antibiotics. ID re-consulted, now on IV cefepime, vancomycin, and flagyl - now de-escalated to ceftriaxone and PO flagyl. PICC team consulted. Pending final ID recs on end date of abx and discharged home to complete therapy in stable condition with ID and podiatry follow up

## 2022-02-09 ENCOUNTER — OFFICE VISIT (OUTPATIENT)
Dept: PODIATRY | Facility: CLINIC | Age: 62
End: 2022-02-09
Payer: COMMERCIAL

## 2022-02-09 VITALS
DIASTOLIC BLOOD PRESSURE: 73 MMHG | BODY MASS INDEX: 27.43 KG/M2 | HEART RATE: 65 BPM | HEIGHT: 76 IN | SYSTOLIC BLOOD PRESSURE: 112 MMHG

## 2022-02-09 DIAGNOSIS — L97.512 ULCER OF GREAT TOE, RIGHT, WITH FAT LAYER EXPOSED: ICD-10-CM

## 2022-02-09 DIAGNOSIS — M86.9 OSTEOMYELITIS, UNSPECIFIED SITE, UNSPECIFIED TYPE: Primary | ICD-10-CM

## 2022-02-09 PROCEDURE — 3074F SYST BP LT 130 MM HG: CPT | Mod: CPTII,S$GLB,, | Performed by: STUDENT IN AN ORGANIZED HEALTH CARE EDUCATION/TRAINING PROGRAM

## 2022-02-09 PROCEDURE — 3008F BODY MASS INDEX DOCD: CPT | Mod: CPTII,S$GLB,, | Performed by: STUDENT IN AN ORGANIZED HEALTH CARE EDUCATION/TRAINING PROGRAM

## 2022-02-09 PROCEDURE — 1159F MED LIST DOCD IN RCRD: CPT | Mod: CPTII,S$GLB,, | Performed by: STUDENT IN AN ORGANIZED HEALTH CARE EDUCATION/TRAINING PROGRAM

## 2022-02-09 PROCEDURE — 99499 UNLISTED E&M SERVICE: CPT | Mod: S$GLB,,, | Performed by: STUDENT IN AN ORGANIZED HEALTH CARE EDUCATION/TRAINING PROGRAM

## 2022-02-09 PROCEDURE — 99999 PR PBB SHADOW E&M-EST. PATIENT-LVL III: ICD-10-PCS | Mod: PBBFAC,,, | Performed by: STUDENT IN AN ORGANIZED HEALTH CARE EDUCATION/TRAINING PROGRAM

## 2022-02-09 PROCEDURE — 3044F HG A1C LEVEL LT 7.0%: CPT | Mod: CPTII,S$GLB,, | Performed by: STUDENT IN AN ORGANIZED HEALTH CARE EDUCATION/TRAINING PROGRAM

## 2022-02-09 PROCEDURE — 3078F PR MOST RECENT DIASTOLIC BLOOD PRESSURE < 80 MM HG: ICD-10-PCS | Mod: CPTII,S$GLB,, | Performed by: STUDENT IN AN ORGANIZED HEALTH CARE EDUCATION/TRAINING PROGRAM

## 2022-02-09 PROCEDURE — 3078F DIAST BP <80 MM HG: CPT | Mod: CPTII,S$GLB,, | Performed by: STUDENT IN AN ORGANIZED HEALTH CARE EDUCATION/TRAINING PROGRAM

## 2022-02-09 PROCEDURE — 99999 PR PBB SHADOW E&M-EST. PATIENT-LVL III: CPT | Mod: PBBFAC,,, | Performed by: STUDENT IN AN ORGANIZED HEALTH CARE EDUCATION/TRAINING PROGRAM

## 2022-02-09 PROCEDURE — 3008F PR BODY MASS INDEX (BMI) DOCUMENTED: ICD-10-PCS | Mod: CPTII,S$GLB,, | Performed by: STUDENT IN AN ORGANIZED HEALTH CARE EDUCATION/TRAINING PROGRAM

## 2022-02-09 PROCEDURE — 1159F PR MEDICATION LIST DOCUMENTED IN MEDICAL RECORD: ICD-10-PCS | Mod: CPTII,S$GLB,, | Performed by: STUDENT IN AN ORGANIZED HEALTH CARE EDUCATION/TRAINING PROGRAM

## 2022-02-09 PROCEDURE — 4010F PR ACE/ARB THEARPY RXD/TAKEN: ICD-10-PCS | Mod: CPTII,S$GLB,, | Performed by: STUDENT IN AN ORGANIZED HEALTH CARE EDUCATION/TRAINING PROGRAM

## 2022-02-09 PROCEDURE — 11042 DBRDMT SUBQ TIS 1ST 20SQCM/<: CPT | Mod: S$GLB,,, | Performed by: STUDENT IN AN ORGANIZED HEALTH CARE EDUCATION/TRAINING PROGRAM

## 2022-02-09 PROCEDURE — 3074F PR MOST RECENT SYSTOLIC BLOOD PRESSURE < 130 MM HG: ICD-10-PCS | Mod: CPTII,S$GLB,, | Performed by: STUDENT IN AN ORGANIZED HEALTH CARE EDUCATION/TRAINING PROGRAM

## 2022-02-09 PROCEDURE — 3044F PR MOST RECENT HEMOGLOBIN A1C LEVEL <7.0%: ICD-10-PCS | Mod: CPTII,S$GLB,, | Performed by: STUDENT IN AN ORGANIZED HEALTH CARE EDUCATION/TRAINING PROGRAM

## 2022-02-09 PROCEDURE — 11042 WOUND DEBRIDEMENT: ICD-10-PCS | Mod: S$GLB,,, | Performed by: STUDENT IN AN ORGANIZED HEALTH CARE EDUCATION/TRAINING PROGRAM

## 2022-02-09 PROCEDURE — 4010F ACE/ARB THERAPY RXD/TAKEN: CPT | Mod: CPTII,S$GLB,, | Performed by: STUDENT IN AN ORGANIZED HEALTH CARE EDUCATION/TRAINING PROGRAM

## 2022-02-09 PROCEDURE — 99499 NO LOS: ICD-10-PCS | Mod: S$GLB,,, | Performed by: STUDENT IN AN ORGANIZED HEALTH CARE EDUCATION/TRAINING PROGRAM

## 2022-02-09 NOTE — PROCEDURES
Wound Debridement    Date/Time: 2/9/2022 11:15 AM  Performed by: Jackie Calderon DPM  Authorized by: Jackie Calderon DPM     Consent Done?:  Yes (Verbal)  Local anesthesia used?: No      Wound Details:    Location:  Right foot    Location:  Right 1st Toe    Type of Debridement:  Excisional       Length (cm):  0.3       Area (sq cm):  0.09       Width (cm):  0.3       Percent Debrided (%):  100       Depth (cm):  0.1       Total Area Debrided (sq cm):  0.09    Depth of debridement:  Subcutaneous tissue    Tissue debrided:  Subcutaneous and Other    Devitalized tissue debrided:  Biofilm, Callus and Fibrin    Instruments:  Blade and Curette    Bleeding:  Minimal  Patient tolerance:  Patient tolerated the procedure well with no immediate complications     No cultures were taken during this visit

## 2022-02-09 NOTE — PROGRESS NOTES
Subjective:      Patient ID: Santiago Lucia is a 61 y.o. male.    Chief Complaint: Foot Problem (Right ft., great toe), Foot Ulcer (Right ft. Toe ulcer), Follow-up, Wound Care, and Dressing Change    Santiago is a 61 y.o. male who presents to the clinic for evaluation and treatment of high risk feet. Santiago has a past medical history of Avascular necrosis of bone of hip, left (10/30/2018), Coronary artery disease, DM w/o complication type II (8/9/2013), and NSTEMI (non-ST elevated myocardial infarction) (01/2013). The patient's chief complaint is foot ulcer, right foot. This patient has documented high risk feet requiring routine maintenance secondary to peripheral neuropathy. Recent admission for abscess and recent MRI positive for osteomyelitis. Currently treated with 6 week of antibiotics per Infectious Disease recommendation. Relates to mild pain. No other pedal complaints.     2/9/22: Pt seen today for follow up of right foot wound, no new pedal complaints.     PCP: Yon Asif MD    Date Last Seen by PCP:     Current shoe gear:  Affected Foot: football with tall boot.      Unaffected Foot: Tennis shoes    History of Trauma: negative  Sign of Infection: none    Hemoglobin A1C   Date Value Ref Range Status   01/11/2022 6.0 (H) 4.0 - 5.6 % Final     Comment:     ADA Screening Guidelines:  5.7-6.4%  Consistent with prediabetes  >or=6.5%  Consistent with diabetes    High levels of fetal hemoglobin interfere with the HbA1C  assay. Heterozygous hemoglobin variants (HbS, HgC, etc)do  not significantly interfere with this assay.   However, presence of multiple variants may affect accuracy.     07/30/2021 6.0 (H) 4.0 - 5.6 % Final     Comment:     ADA Screening Guidelines:  5.7-6.4%  Consistent with prediabetes  >or=6.5%  Consistent with diabetes    High levels of fetal hemoglobin interfere with the HbA1C  assay. Heterozygous hemoglobin variants (HbS, HgC, etc)do  not significantly interfere with this assay.    However, presence of multiple variants may affect accuracy.     01/16/2013 7.1 (H) 4.0 - 6.2 % Final       Review of Systems   Constitutional: Negative for chills, decreased appetite, diaphoresis and fever.   HENT: Negative for congestion and hearing loss.    Cardiovascular: Negative for chest pain, claudication, leg swelling and syncope.   Respiratory: Negative for cough and shortness of breath.    Skin: Positive for dry skin, nail changes and poor wound healing. Negative for color change, flushing, itching and rash.   Musculoskeletal: Positive for arthritis, back pain and joint pain. Negative for joint swelling.   Gastrointestinal: Negative for nausea and vomiting.   Neurological: Positive for numbness. Negative for paresthesias.   Psychiatric/Behavioral: Negative for altered mental status. The patient is not nervous/anxious.            Objective:      Physical Exam  Constitutional:       General: He is not in acute distress.     Appearance: Normal appearance. He is well-developed. He is not diaphoretic.   Cardiovascular:      Comments: Dorsalis pedis and posterior tibial pulses are mildly diminished. Skin temperature is within normal limits. Toes are cool to touch and feet are warm proximally. Hair growth is diminished. Skin is atrophic and with mild hyperpigmentation. No edema noted, bilaterally.   Musculoskeletal:         General: No tenderness.      Comments: Adequate joint range of motion without pain, limitation, nor crepitation to foot and ankle joints. Muscle strength is 5/5 in all groups bilaterally.    Hallux malleus to right great toe   Feet:      Right foot:      Skin integrity: Dry skin present. No ulcer, blister, erythema or warmth.      Left foot:      Skin integrity: Dry skin present. No ulcer, blister, erythema or warmth.   Skin:     General: Skin is warm and dry.      Capillary Refill: Capillary refill takes less than 2 seconds.      Comments: Skin is warm and dry, no acute signs of infection  noted bilaterally      Toenails are thickened by 2-4 mm's, dystrophic, and are darkened in coloration with subungual fungal debris.     Ulcer to dorsal right hallux, see wound description below.     Otherwise, no open wounds, macerations or hyperkeratotic lesions, bilaterally            Neurological:      Mental Status: He is alert and oriented to person, place, and time.      Sensory: Sensory deficit present.      Motor: No abnormal muscle tone.      Comments: Light touch within normal limits.    Psychiatric:         Mood and Affect: Mood normal.         Behavior: Behavior normal.         Thought Content: Thought content normal.       S/p wound debridement with granular base, to level of subcutaneous layer, resolving edema noted. No purulence or acute signs of infection, appears stable.         Previous Images:                  Assessment:       Encounter Diagnoses   Name Primary?    Osteomyelitis, unspecified site, unspecified type Yes    Ulcer of great toe, right, with fat layer exposed          Plan:       Santiago was seen today for foot problem, foot ulcer, follow-up, wound care and dressing change.    Diagnoses and all orders for this visit:    Osteomyelitis, unspecified site, unspecified type  -     Wound Debridement    Ulcer of great toe, right, with fat layer exposed  -     Wound Debridement      I counseled the patient on his conditions, their implications and medical management.    -Wound debridement performed, see procedure note. Dressed with hydrafera and football of matthew foam, cast padding and light coban. Secured in tall boot.    -Home health orders updated as follows:    Remove foot dressings.   Cleanse wound with normal saline or wound . Dry well.    Apply gentian violet to chad wound maceration if needed.   Apply saline moistened hydrafera classic to 1 wound(s).   Next apply foam pad x 2 directly on top of wound bed   Place cast padding between toes to prevent pressure ulcers .  Top with  football dressing consisting 3 rolls of cast padding. Top with flexinet.  Change 3  times per week    -Continue antibiotics per Infectious Disease recommendations    -Rest, elevate. Strict offloading to wound site.     -RTC in 1-2 weeks, sooner PRN

## 2022-02-14 ENCOUNTER — PATIENT MESSAGE (OUTPATIENT)
Dept: PODIATRY | Facility: CLINIC | Age: 62
End: 2022-02-14
Payer: COMMERCIAL

## 2022-02-14 ENCOUNTER — TELEPHONE (OUTPATIENT)
Dept: PODIATRY | Facility: CLINIC | Age: 62
End: 2022-02-14
Payer: COMMERCIAL

## 2022-02-14 ENCOUNTER — LAB VISIT (OUTPATIENT)
Dept: LAB | Facility: HOSPITAL | Age: 62
End: 2022-02-14

## 2022-02-14 DIAGNOSIS — E11.51 TYPE II DIABETES MELLITUS WITH PERIPHERAL CIRCULATORY DISORDER: ICD-10-CM

## 2022-02-14 DIAGNOSIS — L03.031 ABSCESS OF FIFTH TOENAIL OF RIGHT FOOT: Primary | ICD-10-CM

## 2022-02-14 PROCEDURE — 86140 C-REACTIVE PROTEIN: CPT

## 2022-02-14 PROCEDURE — 85652 RBC SED RATE AUTOMATED: CPT

## 2022-02-14 PROCEDURE — 85027 COMPLETE CBC AUTOMATED: CPT

## 2022-02-14 PROCEDURE — 80053 COMPREHEN METABOLIC PANEL: CPT

## 2022-02-14 NOTE — TELEPHONE ENCOUNTER
Spoke with pt to inform him his disability forms were ready to  because there was not a fax number on forms. Pt stated he would get a fax number and contact me back. Forms were scanned into pt's chart.

## 2022-02-14 NOTE — TELEPHONE ENCOUNTER
Pt's disability forms were scanned into chart and faxed to [employer] [phone]. Confirmation received.

## 2022-02-15 LAB
ALBUMIN SERPL BCP-MCNC: 4.2 G/DL (ref 3.5–5.2)
ALP SERPL-CCNC: 78 U/L (ref 55–135)
ALT SERPL W/O P-5'-P-CCNC: 83 U/L (ref 10–44)
ANION GAP SERPL CALC-SCNC: 12 MMOL/L (ref 8–16)
AST SERPL-CCNC: 48 U/L (ref 10–40)
BILIRUB SERPL-MCNC: 0.3 MG/DL (ref 0.1–1)
BUN SERPL-MCNC: 21 MG/DL (ref 8–23)
CALCIUM SERPL-MCNC: 9.8 MG/DL (ref 8.7–10.5)
CHLORIDE SERPL-SCNC: 107 MMOL/L (ref 95–110)
CO2 SERPL-SCNC: 22 MMOL/L (ref 23–29)
CREAT SERPL-MCNC: 1.1 MG/DL (ref 0.5–1.4)
CRP SERPL-MCNC: 1 MG/L (ref 0–8.2)
ERYTHROCYTE [DISTWIDTH] IN BLOOD BY AUTOMATED COUNT: 14 % (ref 11.5–14.5)
ERYTHROCYTE [SEDIMENTATION RATE] IN BLOOD BY WESTERGREN METHOD: 8 MM/HR (ref 0–23)
EST. GFR  (AFRICAN AMERICAN): >60 ML/MIN/1.73 M^2
EST. GFR  (NON AFRICAN AMERICAN): >60 ML/MIN/1.73 M^2
GLUCOSE SERPL-MCNC: 89 MG/DL (ref 70–110)
HCT VFR BLD AUTO: 41.8 % (ref 40–54)
HGB BLD-MCNC: 13 G/DL (ref 14–18)
MCH RBC QN AUTO: 27.9 PG (ref 27–31)
MCHC RBC AUTO-ENTMCNC: 31.1 G/DL (ref 32–36)
MCV RBC AUTO: 90 FL (ref 82–98)
PLATELET # BLD AUTO: 211 K/UL (ref 150–450)
PMV BLD AUTO: 11.2 FL (ref 9.2–12.9)
POTASSIUM SERPL-SCNC: 4.5 MMOL/L (ref 3.5–5.1)
PROT SERPL-MCNC: 7.2 G/DL (ref 6–8.4)
RBC # BLD AUTO: 4.66 M/UL (ref 4.6–6.2)
SODIUM SERPL-SCNC: 141 MMOL/L (ref 136–145)
WBC # BLD AUTO: 9.88 K/UL (ref 3.9–12.7)

## 2022-02-21 ENCOUNTER — LAB VISIT (OUTPATIENT)
Dept: LAB | Facility: HOSPITAL | Age: 62
End: 2022-02-21
Attending: STUDENT IN AN ORGANIZED HEALTH CARE EDUCATION/TRAINING PROGRAM

## 2022-02-21 DIAGNOSIS — L03.031 ABSCESS OF FIFTH TOENAIL OF RIGHT FOOT: ICD-10-CM

## 2022-02-21 DIAGNOSIS — E11.51 TYPE II DIABETES MELLITUS WITH PERIPHERAL CIRCULATORY DISORDER: Primary | ICD-10-CM

## 2022-02-21 PROCEDURE — 80053 COMPREHEN METABOLIC PANEL: CPT | Performed by: STUDENT IN AN ORGANIZED HEALTH CARE EDUCATION/TRAINING PROGRAM

## 2022-02-21 PROCEDURE — 86140 C-REACTIVE PROTEIN: CPT | Performed by: STUDENT IN AN ORGANIZED HEALTH CARE EDUCATION/TRAINING PROGRAM

## 2022-02-21 PROCEDURE — 85652 RBC SED RATE AUTOMATED: CPT | Performed by: STUDENT IN AN ORGANIZED HEALTH CARE EDUCATION/TRAINING PROGRAM

## 2022-02-21 PROCEDURE — 85027 COMPLETE CBC AUTOMATED: CPT | Performed by: STUDENT IN AN ORGANIZED HEALTH CARE EDUCATION/TRAINING PROGRAM

## 2022-02-22 ENCOUNTER — PATIENT MESSAGE (OUTPATIENT)
Dept: RESEARCH | Facility: HOSPITAL | Age: 62
End: 2022-02-22
Payer: COMMERCIAL

## 2022-02-22 LAB
ALBUMIN SERPL BCP-MCNC: 3.9 G/DL (ref 3.5–5.2)
ALP SERPL-CCNC: 66 U/L (ref 55–135)
ALT SERPL W/O P-5'-P-CCNC: 77 U/L (ref 10–44)
ANION GAP SERPL CALC-SCNC: 11 MMOL/L (ref 8–16)
AST SERPL-CCNC: 38 U/L (ref 10–40)
BILIRUB SERPL-MCNC: 0.3 MG/DL (ref 0.1–1)
BUN SERPL-MCNC: 17 MG/DL (ref 8–23)
CALCIUM SERPL-MCNC: 9.4 MG/DL (ref 8.7–10.5)
CHLORIDE SERPL-SCNC: 105 MMOL/L (ref 95–110)
CO2 SERPL-SCNC: 23 MMOL/L (ref 23–29)
CREAT SERPL-MCNC: 0.8 MG/DL (ref 0.5–1.4)
CRP SERPL-MCNC: 0.7 MG/L (ref 0–8.2)
ERYTHROCYTE [DISTWIDTH] IN BLOOD BY AUTOMATED COUNT: 14.3 % (ref 11.5–14.5)
ERYTHROCYTE [SEDIMENTATION RATE] IN BLOOD BY WESTERGREN METHOD: 8 MM/HR (ref 0–23)
EST. GFR  (AFRICAN AMERICAN): >60 ML/MIN/1.73 M^2
EST. GFR  (NON AFRICAN AMERICAN): >60 ML/MIN/1.73 M^2
GLUCOSE SERPL-MCNC: 83 MG/DL (ref 70–110)
HCT VFR BLD AUTO: 40.4 % (ref 40–54)
HGB BLD-MCNC: 12.7 G/DL (ref 14–18)
MCH RBC QN AUTO: 28.2 PG (ref 27–31)
MCHC RBC AUTO-ENTMCNC: 31.4 G/DL (ref 32–36)
MCV RBC AUTO: 90 FL (ref 82–98)
PLATELET # BLD AUTO: 206 K/UL (ref 150–450)
PMV BLD AUTO: 10.9 FL (ref 9.2–12.9)
POTASSIUM SERPL-SCNC: 4.2 MMOL/L (ref 3.5–5.1)
PROT SERPL-MCNC: 6.6 G/DL (ref 6–8.4)
RBC # BLD AUTO: 4.5 M/UL (ref 4.6–6.2)
SODIUM SERPL-SCNC: 139 MMOL/L (ref 136–145)
WBC # BLD AUTO: 10.31 K/UL (ref 3.9–12.7)

## 2022-02-23 ENCOUNTER — OFFICE VISIT (OUTPATIENT)
Dept: PODIATRY | Facility: CLINIC | Age: 62
End: 2022-02-23
Payer: COMMERCIAL

## 2022-02-23 ENCOUNTER — PATIENT MESSAGE (OUTPATIENT)
Dept: PODIATRY | Facility: CLINIC | Age: 62
End: 2022-02-23

## 2022-02-23 VITALS
BODY MASS INDEX: 27.4 KG/M2 | SYSTOLIC BLOOD PRESSURE: 153 MMHG | HEART RATE: 62 BPM | WEIGHT: 225 LBS | DIASTOLIC BLOOD PRESSURE: 91 MMHG | HEIGHT: 76 IN

## 2022-02-23 DIAGNOSIS — L84 PRE-ULCERATIVE CALLUSES: ICD-10-CM

## 2022-02-23 DIAGNOSIS — E11.42 POLYNEUROPATHY DUE TO TYPE 2 DIABETES MELLITUS: ICD-10-CM

## 2022-02-23 DIAGNOSIS — L85.3 DRY SKIN: ICD-10-CM

## 2022-02-23 DIAGNOSIS — R09.89 DIMINISHED PULSE: ICD-10-CM

## 2022-02-23 DIAGNOSIS — M20.30 HALLUX MALLEUS, UNSPECIFIED LATERALITY: ICD-10-CM

## 2022-02-23 DIAGNOSIS — L97.512 ULCER OF GREAT TOE, RIGHT, WITH FAT LAYER EXPOSED: Primary | ICD-10-CM

## 2022-02-23 DIAGNOSIS — E11.51 TYPE II DIABETES MELLITUS WITH PERIPHERAL CIRCULATORY DISORDER: ICD-10-CM

## 2022-02-23 PROCEDURE — 3080F DIAST BP >= 90 MM HG: CPT | Mod: CPTII,S$GLB,, | Performed by: STUDENT IN AN ORGANIZED HEALTH CARE EDUCATION/TRAINING PROGRAM

## 2022-02-23 PROCEDURE — 3044F HG A1C LEVEL LT 7.0%: CPT | Mod: CPTII,S$GLB,, | Performed by: STUDENT IN AN ORGANIZED HEALTH CARE EDUCATION/TRAINING PROGRAM

## 2022-02-23 PROCEDURE — 1159F MED LIST DOCD IN RCRD: CPT | Mod: CPTII,S$GLB,, | Performed by: STUDENT IN AN ORGANIZED HEALTH CARE EDUCATION/TRAINING PROGRAM

## 2022-02-23 PROCEDURE — 4010F ACE/ARB THERAPY RXD/TAKEN: CPT | Mod: CPTII,S$GLB,, | Performed by: STUDENT IN AN ORGANIZED HEALTH CARE EDUCATION/TRAINING PROGRAM

## 2022-02-23 PROCEDURE — 99214 PR OFFICE/OUTPT VISIT, EST, LEVL IV, 30-39 MIN: ICD-10-PCS | Mod: 25,S$GLB,, | Performed by: STUDENT IN AN ORGANIZED HEALTH CARE EDUCATION/TRAINING PROGRAM

## 2022-02-23 PROCEDURE — 3044F PR MOST RECENT HEMOGLOBIN A1C LEVEL <7.0%: ICD-10-PCS | Mod: CPTII,S$GLB,, | Performed by: STUDENT IN AN ORGANIZED HEALTH CARE EDUCATION/TRAINING PROGRAM

## 2022-02-23 PROCEDURE — 3077F PR MOST RECENT SYSTOLIC BLOOD PRESSURE >= 140 MM HG: ICD-10-PCS | Mod: CPTII,S$GLB,, | Performed by: STUDENT IN AN ORGANIZED HEALTH CARE EDUCATION/TRAINING PROGRAM

## 2022-02-23 PROCEDURE — 99214 OFFICE O/P EST MOD 30 MIN: CPT | Mod: 25,S$GLB,, | Performed by: STUDENT IN AN ORGANIZED HEALTH CARE EDUCATION/TRAINING PROGRAM

## 2022-02-23 PROCEDURE — 4010F PR ACE/ARB THEARPY RXD/TAKEN: ICD-10-PCS | Mod: CPTII,S$GLB,, | Performed by: STUDENT IN AN ORGANIZED HEALTH CARE EDUCATION/TRAINING PROGRAM

## 2022-02-23 PROCEDURE — 3080F PR MOST RECENT DIASTOLIC BLOOD PRESSURE >= 90 MM HG: ICD-10-PCS | Mod: CPTII,S$GLB,, | Performed by: STUDENT IN AN ORGANIZED HEALTH CARE EDUCATION/TRAINING PROGRAM

## 2022-02-23 PROCEDURE — 99999 PR PBB SHADOW E&M-EST. PATIENT-LVL IV: CPT | Mod: PBBFAC,,, | Performed by: STUDENT IN AN ORGANIZED HEALTH CARE EDUCATION/TRAINING PROGRAM

## 2022-02-23 PROCEDURE — 3077F SYST BP >= 140 MM HG: CPT | Mod: CPTII,S$GLB,, | Performed by: STUDENT IN AN ORGANIZED HEALTH CARE EDUCATION/TRAINING PROGRAM

## 2022-02-23 PROCEDURE — 3008F BODY MASS INDEX DOCD: CPT | Mod: CPTII,S$GLB,, | Performed by: STUDENT IN AN ORGANIZED HEALTH CARE EDUCATION/TRAINING PROGRAM

## 2022-02-23 PROCEDURE — 99999 PR PBB SHADOW E&M-EST. PATIENT-LVL IV: ICD-10-PCS | Mod: PBBFAC,,, | Performed by: STUDENT IN AN ORGANIZED HEALTH CARE EDUCATION/TRAINING PROGRAM

## 2022-02-23 PROCEDURE — 1159F PR MEDICATION LIST DOCUMENTED IN MEDICAL RECORD: ICD-10-PCS | Mod: CPTII,S$GLB,, | Performed by: STUDENT IN AN ORGANIZED HEALTH CARE EDUCATION/TRAINING PROGRAM

## 2022-02-23 PROCEDURE — 3008F PR BODY MASS INDEX (BMI) DOCUMENTED: ICD-10-PCS | Mod: CPTII,S$GLB,, | Performed by: STUDENT IN AN ORGANIZED HEALTH CARE EDUCATION/TRAINING PROGRAM

## 2022-02-23 NOTE — PROGRESS NOTES
Subjective:      Patient ID: Santiago Lucia is a 61 y.o. male.    Chief Complaint: Wound Check (2 week wound check right great toe)    Santiago is a 61 y.o. male who presents to the clinic for evaluation and treatment of high risk feet. Santiago has a past medical history of Avascular necrosis of bone of hip, left (10/30/2018), Coronary artery disease, DM w/o complication type II (8/9/2013), and NSTEMI (non-ST elevated myocardial infarction) (01/2013). The patient's chief complaint is foot ulcer, right foot. This patient has documented high risk feet requiring routine maintenance secondary to peripheral neuropathy. Recent admission for abscess and recent MRI positive for osteomyelitis. Currently treated with 6 week of antibiotics per Infectious Disease recommendation. Relates to mild pain. No other pedal complaints.     2/9/22: Pt seen today for follow up of right foot wound, no new pedal complaints.     2/23/22: Pt seen today for follow up of right foot wound, has callus x2 to left foot as well. No other pedal complaints.     PCP: Yon Asif MD    Date Last Seen by PCP:     Current shoe gear:  Affected Foot: football with tall boot.      Unaffected Foot: Tennis shoes    History of Trauma: negative  Sign of Infection: none    Hemoglobin A1C   Date Value Ref Range Status   01/11/2022 6.0 (H) 4.0 - 5.6 % Final     Comment:     ADA Screening Guidelines:  5.7-6.4%  Consistent with prediabetes  >or=6.5%  Consistent with diabetes    High levels of fetal hemoglobin interfere with the HbA1C  assay. Heterozygous hemoglobin variants (HbS, HgC, etc)do  not significantly interfere with this assay.   However, presence of multiple variants may affect accuracy.     07/30/2021 6.0 (H) 4.0 - 5.6 % Final     Comment:     ADA Screening Guidelines:  5.7-6.4%  Consistent with prediabetes  >or=6.5%  Consistent with diabetes    High levels of fetal hemoglobin interfere with the HbA1C  assay. Heterozygous hemoglobin variants (HbS,  HgC, etc)do  not significantly interfere with this assay.   However, presence of multiple variants may affect accuracy.     01/16/2013 7.1 (H) 4.0 - 6.2 % Final       Review of Systems   Constitutional: Negative for chills, decreased appetite, diaphoresis and fever.   HENT: Negative for congestion and hearing loss.    Cardiovascular: Negative for chest pain, claudication, leg swelling and syncope.   Respiratory: Negative for cough and shortness of breath.    Skin: Positive for dry skin, nail changes and poor wound healing. Negative for color change, flushing, itching and rash.   Musculoskeletal: Positive for arthritis, back pain and joint pain. Negative for joint swelling.   Gastrointestinal: Negative for nausea and vomiting.   Neurological: Positive for numbness. Negative for paresthesias.   Psychiatric/Behavioral: Negative for altered mental status. The patient is not nervous/anxious.            Objective:      Physical Exam  Constitutional:       General: He is not in acute distress.     Appearance: Normal appearance. He is well-developed. He is not diaphoretic.   Cardiovascular:      Comments: Dorsalis pedis and posterior tibial pulses are mildly diminished. Skin temperature is within normal limits. Toes are cool to touch and feet are warm proximally. Hair growth is diminished. Skin is atrophic and with mild hyperpigmentation. No edema noted, bilaterally.   Musculoskeletal:         General: No tenderness.      Comments: Adequate joint range of motion without pain, limitation, nor crepitation to foot and ankle joints. Muscle strength is 5/5 in all groups bilaterally.    Hallux malleus to right great toe   Feet:      Right foot:      Skin integrity: Dry skin present. No ulcer, blister, erythema or warmth.      Left foot:      Skin integrity: Dry skin present. No ulcer, blister, erythema or warmth.   Skin:     General: Skin is warm and dry.      Capillary Refill: Capillary refill takes less than 2 seconds.       Comments: Skin is warm and dry, no acute signs of infection noted bilaterally      Toenails are thickened by 2-4 mm's, dystrophic, and are darkened in coloration with subungual fungal debris.     Previous ulcer with diffuse overlying callus to dorsal right hallux, upon debridement, wound is healed.     Pre-ulcerative callus to left medial hallux    Callus to lateral left 5th digit    Otherwise, no open wounds, macerations or hyperkeratotic lesions, bilaterally            Neurological:      Mental Status: He is alert and oriented to person, place, and time.      Sensory: Sensory deficit present.      Motor: No abnormal muscle tone.      Comments: Light touch within normal limits.    Psychiatric:         Mood and Affect: Mood normal.         Behavior: Behavior normal.         Thought Content: Thought content normal.       Previous Images:      Previous Images:                  Assessment:       Encounter Diagnoses   Name Primary?    Ulcer of great toe, right, with fat layer exposed Yes    Pre-ulcerative calluses     Diminished pulse     Dry skin     Hallux malleus, unspecified laterality     Type II diabetes mellitus with peripheral circulatory disorder     Polyneuropathy due to type 2 diabetes mellitus          Plan:       Santiago was seen today for wound check.    Diagnoses and all orders for this visit:    Ulcer of great toe, right, with fat layer exposed  -     Ambulatory referral/consult to Interventional Cardiology; Future  -     Wound Debridement  -     SUBSEQUENT HOME HEALTH ORDERS    Pre-ulcerative calluses    Diminished pulse    Dry skin    Hallux malleus, unspecified laterality    Type II diabetes mellitus with peripheral circulatory disorder    Polyneuropathy due to type 2 diabetes mellitus    Other orders  -     Cancel: Routine Foot Care      I counseled the patient on his conditions, their implications and medical management.    -Wound debridement performed, wound is healed. Dressed with protective  football of matthew foam, cast padding and light coban. Secured in tall boot.    -Home health orders updated as follows:    Remove foot dressings.   Cleanse wound with normal saline or wound . Dry well.    Apply gentian violet to chad wound maceration if needed.   Next apply foam pad x 2 directly on top of wound bed   Place cast padding between toes to prevent pressure ulcers .  Top with football dressing consisting 3 rolls of cast padding. Top with flexinet.  Change 2  times per week and PRN    -Referral to Cardiology for recurrent wounds for further recommendations, PAD seen on arterial US.    -Continue antibiotics per Infectious Disease recommendations    -Rest, elevate. Strict offloading to wound site.     -RTC in 1-2 weeks, sooner PRN

## 2022-02-23 NOTE — PROCEDURES
Wound Debridement    Date/Time: 2/23/2022 8:30 AM  Performed by: Jackie Calderon DPM  Authorized by: Jackie Calderon DPM     Consent Done?:  Yes (Verbal)  Local anesthesia used?: No      Wound Details:    Location:  Right foot    Location:  Right 1st Toe    Type of Debridement:  Non-excisional       Length (cm):  0       Area (sq cm):  0       Width (cm):  0       Percent Debrided (%):  100       Depth (cm):  0       Total Area Debrided (sq cm):  0    Depth of debridement:  Epidermis/Dermis    Tissue debrided:  Epidermis    Devitalized tissue debrided:  Callus    Instruments:  Blade and Curette    2nd Wound Details:     Location:  Left foot    Location:  Left 1st Toe    Location:  Left 1st Toe    Type of Debridement:  Non-excisional       Length (cm):  0       Area (sq cm):  0       Width (cm):  0       Percent Debrided (%):  100       Depth (cm):  0       Total Area Debrided (sq cm):  0    Depth of debridement:  Epidermis/Dermis    Tissue debrided:  Epidermis    Devitalized tissue debrided:  Callus    Instruments:  Blade    3rd Wound Details:     Location:  Left foot    Location:  Left 5th Toe    Location:  Left 5th Toe    Type of Debridement:  Non-excisional       Length (cm):  0       Area (sq cm):  0       Width (cm):  0       Percent Debrided (%):  100       Depth (cm):  0       Total Area Debrided (sq cm):  0    Depth of debridement:  Epidermis/Dermis    Tissue debrided:  Epidermis    Devitalized tissue debrided:  Callus    Instruments:  Blade and Curette    Bleeding:  None  Patient tolerance:  Patient tolerated the procedure well with no immediate complications     No cultures were taken during this visit

## 2022-03-01 ENCOUNTER — PATIENT MESSAGE (OUTPATIENT)
Dept: PODIATRY | Facility: CLINIC | Age: 62
End: 2022-03-01
Payer: COMMERCIAL

## 2022-03-08 ENCOUNTER — OFFICE VISIT (OUTPATIENT)
Dept: CARDIOLOGY | Facility: CLINIC | Age: 62
End: 2022-03-08
Payer: COMMERCIAL

## 2022-03-08 VITALS
DIASTOLIC BLOOD PRESSURE: 60 MMHG | BODY MASS INDEX: 26.61 KG/M2 | HEART RATE: 73 BPM | WEIGHT: 218.5 LBS | SYSTOLIC BLOOD PRESSURE: 110 MMHG | HEIGHT: 76 IN | OXYGEN SATURATION: 99 %

## 2022-03-08 DIAGNOSIS — E78.5 DYSLIPIDEMIA: ICD-10-CM

## 2022-03-08 DIAGNOSIS — I10 HYPERTENSION, UNSPECIFIED TYPE: ICD-10-CM

## 2022-03-08 DIAGNOSIS — E11.51 TYPE 2 DIABETES MELLITUS WITH DIABETIC PERIPHERAL ANGIOPATHY WITHOUT GANGRENE, WITHOUT LONG-TERM CURRENT USE OF INSULIN: ICD-10-CM

## 2022-03-08 DIAGNOSIS — I73.9 PVD (PERIPHERAL VASCULAR DISEASE): ICD-10-CM

## 2022-03-08 DIAGNOSIS — Z95.820 S/P ANGIOPLASTY WITH STENT: Chronic | ICD-10-CM

## 2022-03-08 DIAGNOSIS — I25.10 CORONARY ARTERY DISEASE INVOLVING NATIVE CORONARY ARTERY OF NATIVE HEART WITHOUT ANGINA PECTORIS: Primary | ICD-10-CM

## 2022-03-08 DIAGNOSIS — L97.512 ULCER OF GREAT TOE, RIGHT, WITH FAT LAYER EXPOSED: ICD-10-CM

## 2022-03-08 PROCEDURE — 1159F MED LIST DOCD IN RCRD: CPT | Mod: CPTII,S$GLB,, | Performed by: INTERNAL MEDICINE

## 2022-03-08 PROCEDURE — 99999 PR PBB SHADOW E&M-EST. PATIENT-LVL IV: ICD-10-PCS | Mod: PBBFAC,,, | Performed by: INTERNAL MEDICINE

## 2022-03-08 PROCEDURE — 99214 PR OFFICE/OUTPT VISIT, EST, LEVL IV, 30-39 MIN: ICD-10-PCS | Mod: S$GLB,,, | Performed by: INTERNAL MEDICINE

## 2022-03-08 PROCEDURE — 1160F PR REVIEW ALL MEDS BY PRESCRIBER/CLIN PHARMACIST DOCUMENTED: ICD-10-PCS | Mod: CPTII,S$GLB,, | Performed by: INTERNAL MEDICINE

## 2022-03-08 PROCEDURE — 4010F PR ACE/ARB THEARPY RXD/TAKEN: ICD-10-PCS | Mod: CPTII,S$GLB,, | Performed by: INTERNAL MEDICINE

## 2022-03-08 PROCEDURE — 1160F RVW MEDS BY RX/DR IN RCRD: CPT | Mod: CPTII,S$GLB,, | Performed by: INTERNAL MEDICINE

## 2022-03-08 PROCEDURE — 3044F HG A1C LEVEL LT 7.0%: CPT | Mod: CPTII,S$GLB,, | Performed by: INTERNAL MEDICINE

## 2022-03-08 PROCEDURE — 3008F PR BODY MASS INDEX (BMI) DOCUMENTED: ICD-10-PCS | Mod: CPTII,S$GLB,, | Performed by: INTERNAL MEDICINE

## 2022-03-08 PROCEDURE — 99999 PR PBB SHADOW E&M-EST. PATIENT-LVL IV: CPT | Mod: PBBFAC,,, | Performed by: INTERNAL MEDICINE

## 2022-03-08 PROCEDURE — 3008F BODY MASS INDEX DOCD: CPT | Mod: CPTII,S$GLB,, | Performed by: INTERNAL MEDICINE

## 2022-03-08 PROCEDURE — 4010F ACE/ARB THERAPY RXD/TAKEN: CPT | Mod: CPTII,S$GLB,, | Performed by: INTERNAL MEDICINE

## 2022-03-08 PROCEDURE — 3074F PR MOST RECENT SYSTOLIC BLOOD PRESSURE < 130 MM HG: ICD-10-PCS | Mod: CPTII,S$GLB,, | Performed by: INTERNAL MEDICINE

## 2022-03-08 PROCEDURE — 3078F DIAST BP <80 MM HG: CPT | Mod: CPTII,S$GLB,, | Performed by: INTERNAL MEDICINE

## 2022-03-08 PROCEDURE — 3078F PR MOST RECENT DIASTOLIC BLOOD PRESSURE < 80 MM HG: ICD-10-PCS | Mod: CPTII,S$GLB,, | Performed by: INTERNAL MEDICINE

## 2022-03-08 PROCEDURE — 99214 OFFICE O/P EST MOD 30 MIN: CPT | Mod: S$GLB,,, | Performed by: INTERNAL MEDICINE

## 2022-03-08 PROCEDURE — 3044F PR MOST RECENT HEMOGLOBIN A1C LEVEL <7.0%: ICD-10-PCS | Mod: CPTII,S$GLB,, | Performed by: INTERNAL MEDICINE

## 2022-03-08 PROCEDURE — 1159F PR MEDICATION LIST DOCUMENTED IN MEDICAL RECORD: ICD-10-PCS | Mod: CPTII,S$GLB,, | Performed by: INTERNAL MEDICINE

## 2022-03-08 PROCEDURE — 3074F SYST BP LT 130 MM HG: CPT | Mod: CPTII,S$GLB,, | Performed by: INTERNAL MEDICINE

## 2022-03-08 NOTE — PROGRESS NOTES
Subjective:    Patient ID:  Santiago Lucia is a 61 y.o. male who presents for follow-up of Peripheral Arterial Disease      HPI    62 y/o male who presents for evaluation of PAD. Pt of Dr Velasquez. He has a hx of CAD s/p PCI, HTN, HLD, DM. Has ulcer on right great toe and has been difficult to completely heal. Recent arterial doppler with:  No hemodynamically significant stenosis or vascular occlusion demonstrated in the right or left lower extremity arterial system.  While there are predominantly triphasic waveforms, some vessels demonstrate monophasic and biphasic waveforms indicating some vascular disease which is not hemodynamically significant.  Denies claudication, CP, SOB/BERMUDEZ, orthopnea, PND, syncope, palps, LE edema. Compliant with meds. Non smoker.    Review of Systems   Constitutional: Negative for malaise/fatigue.   HENT: Negative for congestion.    Eyes: Negative for blurred vision.   Cardiovascular: Negative for chest pain, claudication, cyanosis, dyspnea on exertion, irregular heartbeat, leg swelling, near-syncope, orthopnea, palpitations, paroxysmal nocturnal dyspnea and syncope.   Respiratory: Negative for shortness of breath.    Endocrine: Negative for polyuria.   Hematologic/Lymphatic: Negative for bleeding problem.   Skin: Positive for poor wound healing. Negative for itching and rash.   Musculoskeletal: Negative for joint swelling, muscle cramps and muscle weakness.   Gastrointestinal: Negative for abdominal pain, hematemesis, hematochezia, melena, nausea and vomiting.   Genitourinary: Negative for dysuria and hematuria.   Neurological: Negative for dizziness, focal weakness, headaches, light-headedness, loss of balance and weakness.   Psychiatric/Behavioral: Negative for depression. The patient is not nervous/anxious.         Objective:    Physical Exam  Constitutional:       Appearance: He is well-developed.   HENT:      Head: Normocephalic and atraumatic.   Neck:      Vascular: No JVD.    Cardiovascular:      Rate and Rhythm: Normal rate and regular rhythm.      Pulses:           Carotid pulses are 2+ on the right side and 2+ on the left side.       Radial pulses are 2+ on the right side and 2+ on the left side.        Femoral pulses are 2+ on the right side and 2+ on the left side.     Heart sounds: Normal heart sounds.   Pulmonary:      Effort: Pulmonary effort is normal.      Breath sounds: Normal breath sounds.   Abdominal:      General: Bowel sounds are normal.      Palpations: Abdomen is soft.   Musculoskeletal:      Cervical back: Neck supple.   Skin:     General: Skin is warm and dry.   Neurological:      Mental Status: He is alert and oriented to person, place, and time.   Psychiatric:         Behavior: Behavior normal.         Thought Content: Thought content normal.           Assessment:       1. Coronary artery disease involving native coronary artery of native heart without angina pectoris    2. Ulcer of great toe, right, with fat layer exposed    3. S/P angioplasty with stent    4. Dyslipidemia    5. Hypertension, unspecified type    6. PVD (peripheral vascular disease)    7. Type 2 diabetes mellitus with diabetic peripheral angiopathy without gangrene, without long-term current use of insulin      60 y/o pt with hx and presentation as above. Doing well from a cardiac perspective and compensated from a HF perspective. Regarding PAD, will obtain TCOM's. Already on ASA/statin. Will add Xarelto. Walking program when able. Discussed the etiology, evaluation, and management of PAD, nonhealing wound, CLI, CAD, PCI, HTN, DM. Discussed the importance of med compliance, heart healthy diet, and regular exercise.        Plan:       -TCOM's  -Xarelto 2.5 mg BID  -f/u with Dr Velasquez in 2-3 weeks

## 2022-03-09 ENCOUNTER — OFFICE VISIT (OUTPATIENT)
Dept: PODIATRY | Facility: CLINIC | Age: 62
End: 2022-03-09
Payer: COMMERCIAL

## 2022-03-09 VITALS
HEIGHT: 76 IN | WEIGHT: 218 LBS | SYSTOLIC BLOOD PRESSURE: 132 MMHG | DIASTOLIC BLOOD PRESSURE: 82 MMHG | BODY MASS INDEX: 26.55 KG/M2 | HEART RATE: 68 BPM

## 2022-03-09 DIAGNOSIS — L84 PRE-ULCERATIVE CALLUSES: ICD-10-CM

## 2022-03-09 DIAGNOSIS — Z87.2 HEALED ULCER OF RIGHT FOOT ON EXAMINATION: ICD-10-CM

## 2022-03-09 DIAGNOSIS — E11.42 POLYNEUROPATHY DUE TO TYPE 2 DIABETES MELLITUS: ICD-10-CM

## 2022-03-09 DIAGNOSIS — R09.89 DIMINISHED PULSE: ICD-10-CM

## 2022-03-09 DIAGNOSIS — E11.51 TYPE II DIABETES MELLITUS WITH PERIPHERAL CIRCULATORY DISORDER: ICD-10-CM

## 2022-03-09 DIAGNOSIS — L97.512 ULCER OF GREAT TOE, RIGHT, WITH FAT LAYER EXPOSED: Primary | ICD-10-CM

## 2022-03-09 DIAGNOSIS — M20.30 HALLUX MALLEUS, UNSPECIFIED LATERALITY: ICD-10-CM

## 2022-03-09 PROCEDURE — 3044F HG A1C LEVEL LT 7.0%: CPT | Mod: CPTII,S$GLB,, | Performed by: STUDENT IN AN ORGANIZED HEALTH CARE EDUCATION/TRAINING PROGRAM

## 2022-03-09 PROCEDURE — 3008F BODY MASS INDEX DOCD: CPT | Mod: CPTII,S$GLB,, | Performed by: STUDENT IN AN ORGANIZED HEALTH CARE EDUCATION/TRAINING PROGRAM

## 2022-03-09 PROCEDURE — 4010F PR ACE/ARB THEARPY RXD/TAKEN: ICD-10-PCS | Mod: CPTII,S$GLB,, | Performed by: STUDENT IN AN ORGANIZED HEALTH CARE EDUCATION/TRAINING PROGRAM

## 2022-03-09 PROCEDURE — 99214 PR OFFICE/OUTPT VISIT, EST, LEVL IV, 30-39 MIN: ICD-10-PCS | Mod: 25,S$GLB,, | Performed by: STUDENT IN AN ORGANIZED HEALTH CARE EDUCATION/TRAINING PROGRAM

## 2022-03-09 PROCEDURE — 99214 OFFICE O/P EST MOD 30 MIN: CPT | Mod: 25,S$GLB,, | Performed by: STUDENT IN AN ORGANIZED HEALTH CARE EDUCATION/TRAINING PROGRAM

## 2022-03-09 PROCEDURE — 4010F ACE/ARB THERAPY RXD/TAKEN: CPT | Mod: CPTII,S$GLB,, | Performed by: STUDENT IN AN ORGANIZED HEALTH CARE EDUCATION/TRAINING PROGRAM

## 2022-03-09 PROCEDURE — 3075F PR MOST RECENT SYSTOLIC BLOOD PRESS GE 130-139MM HG: ICD-10-PCS | Mod: CPTII,S$GLB,, | Performed by: STUDENT IN AN ORGANIZED HEALTH CARE EDUCATION/TRAINING PROGRAM

## 2022-03-09 PROCEDURE — 99999 PR PBB SHADOW E&M-EST. PATIENT-LVL III: ICD-10-PCS | Mod: PBBFAC,,, | Performed by: STUDENT IN AN ORGANIZED HEALTH CARE EDUCATION/TRAINING PROGRAM

## 2022-03-09 PROCEDURE — 3044F PR MOST RECENT HEMOGLOBIN A1C LEVEL <7.0%: ICD-10-PCS | Mod: CPTII,S$GLB,, | Performed by: STUDENT IN AN ORGANIZED HEALTH CARE EDUCATION/TRAINING PROGRAM

## 2022-03-09 PROCEDURE — 99999 PR PBB SHADOW E&M-EST. PATIENT-LVL III: CPT | Mod: PBBFAC,,, | Performed by: STUDENT IN AN ORGANIZED HEALTH CARE EDUCATION/TRAINING PROGRAM

## 2022-03-09 PROCEDURE — 3008F PR BODY MASS INDEX (BMI) DOCUMENTED: ICD-10-PCS | Mod: CPTII,S$GLB,, | Performed by: STUDENT IN AN ORGANIZED HEALTH CARE EDUCATION/TRAINING PROGRAM

## 2022-03-09 PROCEDURE — 3075F SYST BP GE 130 - 139MM HG: CPT | Mod: CPTII,S$GLB,, | Performed by: STUDENT IN AN ORGANIZED HEALTH CARE EDUCATION/TRAINING PROGRAM

## 2022-03-09 PROCEDURE — 3079F PR MOST RECENT DIASTOLIC BLOOD PRESSURE 80-89 MM HG: ICD-10-PCS | Mod: CPTII,S$GLB,, | Performed by: STUDENT IN AN ORGANIZED HEALTH CARE EDUCATION/TRAINING PROGRAM

## 2022-03-09 PROCEDURE — 1159F MED LIST DOCD IN RCRD: CPT | Mod: CPTII,S$GLB,, | Performed by: STUDENT IN AN ORGANIZED HEALTH CARE EDUCATION/TRAINING PROGRAM

## 2022-03-09 PROCEDURE — 1159F PR MEDICATION LIST DOCUMENTED IN MEDICAL RECORD: ICD-10-PCS | Mod: CPTII,S$GLB,, | Performed by: STUDENT IN AN ORGANIZED HEALTH CARE EDUCATION/TRAINING PROGRAM

## 2022-03-09 PROCEDURE — 3079F DIAST BP 80-89 MM HG: CPT | Mod: CPTII,S$GLB,, | Performed by: STUDENT IN AN ORGANIZED HEALTH CARE EDUCATION/TRAINING PROGRAM

## 2022-03-09 NOTE — PROGRESS NOTES
Subjective:      Patient ID: Santiago Lucia is a 61 y.o. male.    Chief Complaint: Wound Check (2 week f/u great toe wound check)    Santiago is a 61 y.o. male who presents to the clinic for evaluation and treatment of high risk feet. Santiago has a past medical history of Avascular necrosis of bone of hip, left (10/30/2018), Coronary artery disease, DM w/o complication type II (8/9/2013), and NSTEMI (non-ST elevated myocardial infarction) (01/2013). The patient's chief complaint is foot ulcer, right foot. This patient has documented high risk feet requiring routine maintenance secondary to peripheral neuropathy. Recent admission for abscess and recent MRI positive for osteomyelitis. Currently treated with 6 week of antibiotics per Infectious Disease recommendation. Relates to mild pain. No other pedal complaints.     2/9/22: Pt seen today for follow up of right foot wound, no new pedal complaints.     2/23/22: Pt seen today for follow up of right foot wound, has callus x2 to left foot as well. No other pedal complaints.     3/9/22: Pt seen today for follow up of right foot and callus x2 to left foot. No other pedal complaints.     PCP: Yon Asif MD    Date Last Seen by PCP:     Current shoe gear:  Affected Foot: football with tall boot.      Unaffected Foot: Tennis shoes    History of Trauma: negative  Sign of Infection: none    Hemoglobin A1C   Date Value Ref Range Status   01/11/2022 6.0 (H) 4.0 - 5.6 % Final     Comment:     ADA Screening Guidelines:  5.7-6.4%  Consistent with prediabetes  >or=6.5%  Consistent with diabetes    High levels of fetal hemoglobin interfere with the HbA1C  assay. Heterozygous hemoglobin variants (HbS, HgC, etc)do  not significantly interfere with this assay.   However, presence of multiple variants may affect accuracy.     07/30/2021 6.0 (H) 4.0 - 5.6 % Final     Comment:     ADA Screening Guidelines:  5.7-6.4%  Consistent with prediabetes  >or=6.5%  Consistent with  diabetes    High levels of fetal hemoglobin interfere with the HbA1C  assay. Heterozygous hemoglobin variants (HbS, HgC, etc)do  not significantly interfere with this assay.   However, presence of multiple variants may affect accuracy.     01/16/2013 7.1 (H) 4.0 - 6.2 % Final       Review of Systems   Constitutional: Negative for chills, decreased appetite, diaphoresis and fever.   HENT: Negative for congestion and hearing loss.    Cardiovascular: Negative for chest pain, claudication, leg swelling and syncope.   Respiratory: Negative for cough and shortness of breath.    Skin: Positive for dry skin, nail changes and poor wound healing. Negative for color change, flushing, itching and rash.   Musculoskeletal: Positive for arthritis, back pain and joint pain. Negative for joint swelling.   Gastrointestinal: Negative for nausea and vomiting.   Neurological: Positive for numbness. Negative for paresthesias.   Psychiatric/Behavioral: Negative for altered mental status. The patient is not nervous/anxious.            Objective:      Physical Exam  Constitutional:       General: He is not in acute distress.     Appearance: Normal appearance. He is well-developed. He is not diaphoretic.   Cardiovascular:      Comments: Dorsalis pedis and posterior tibial pulses are mildly diminished. Skin temperature is within normal limits. Toes are cool to touch and feet are warm proximally. Hair growth is diminished. Skin is atrophic and with mild hyperpigmentation. No edema noted, bilaterally.   Musculoskeletal:         General: No tenderness.      Comments: Adequate joint range of motion without pain, limitation, nor crepitation to foot and ankle joints. Muscle strength is 5/5 in all groups bilaterally.    Hallux malleus to right great toe   Feet:      Right foot:      Skin integrity: Dry skin present. No ulcer, blister, erythema or warmth.      Left foot:      Skin integrity: Dry skin present. No ulcer, blister, erythema or warmth.    Skin:     General: Skin is warm and dry.      Capillary Refill: Capillary refill takes less than 2 seconds.      Comments: Skin is warm and dry, no acute signs of infection noted bilaterally      Toenails are thickened by 2-4 mm's, dystrophic, and are darkened in coloration with subungual fungal debris.     Previous ulcer with diffuse overlying callus to dorsal right hallux, upon debridement, wound is healed.     Pre-ulcerative callus to left medial hallux    Callus to lateral left 5th digit    Otherwise, no open wounds, macerations or hyperkeratotic lesions, bilaterally            Neurological:      Mental Status: He is alert and oriented to person, place, and time.      Sensory: Sensory deficit present.      Motor: No abnormal muscle tone.      Comments: Light touch within normal limits.    Psychiatric:         Mood and Affect: Mood normal.         Behavior: Behavior normal.         Thought Content: Thought content normal.       Wound remains healed.     Previous Images:      Previous Images:                  Assessment:       Encounter Diagnoses   Name Primary?    Ulcer of great toe, right, with fat layer exposed Yes    Diminished pulse     Type II diabetes mellitus with peripheral circulatory disorder     Polyneuropathy due to type 2 diabetes mellitus     Healed ulcer of right foot on examination     Hallux malleus, unspecified laterality          Plan:       Santiago was seen today for wound check.    Diagnoses and all orders for this visit:    Ulcer of great toe, right, with fat layer exposed    Diminished pulse    Type II diabetes mellitus with peripheral circulatory disorder  -     DIABETIC SHOES FOR HOME USE    Polyneuropathy due to type 2 diabetes mellitus  -     DIABETIC SHOES FOR HOME USE    Healed ulcer of right foot on examination  -     DIABETIC SHOES FOR HOME USE    Hallux malleus, unspecified laterality  -     DIABETIC SHOES FOR HOME USE      I counseled the patient on his conditions, their  implications and medical management.    -Wound debridement performed, wound is healed. Dressed with protective football of matthew foam, cast padding and light coban. Secured in darco shoe. May remove at home and transition to supportive tennis shoe. Rx diabetic shoes. Pt to wear at all times while ambulating with frequent daily foot checks.     -Ok to D/C home health    -Referral to Cardiology for recurrent wounds for further recommendations, PAD seen on arterial US. TCOMs pending    -Rest, elevate. Strict offloading to wound site.     -RTC in 1 mo, sooner PRN

## 2022-03-09 NOTE — PROCEDURES
Wound Debridement    Date/Time: 3/9/2022 8:15 AM  Performed by: Jackie Calderon DPM  Authorized by: Jackie Calderon DPM     Consent Done?:  Yes (Verbal)  Local anesthesia used?: No      Wound Details:    Location:  Right foot    Location:  Right 1st Toe    Type of Debridement:  Non-excisional       Length (cm):  0       Area (sq cm):  0       Width (cm):  0       Percent Debrided (%):  100       Depth (cm):  0       Total Area Debrided (sq cm):  0    Depth of debridement:  Epidermis/Dermis    Tissue debrided:  Epidermis    Devitalized tissue debrided:  Callus    Instruments:  Blade    2nd Wound Details:     Location:  Left foot    Location:  Left 1st Toe    Location:  Left 1st Toe    Type of Debridement:  Non-excisional       Length (cm):  0       Area (sq cm):  0       Width (cm):  0       Percent Debrided (%):  100       Depth (cm):  0       Total Area Debrided (sq cm):  0    Depth of debridement:  Epidermis/Dermis    Tissue debrided:  Epidermis    Devitalized tissue debrided:  Callus    Instruments:  Blade    3rd Wound Details:     Location:  Left foot    Location:  Left 5th Toe    Location:  Left 5th Toe    Type of Debridement:  Non-excisional       Length (cm):  0       Area (sq cm):  0       Width (cm):  0       Percent Debrided (%):  100       Depth (cm):  0       Total Area Debrided (sq cm):  0    Depth of debridement:  Epidermis/Dermis    Tissue debrided:  Epidermis    Devitalized tissue debrided:  Callus    Instruments:  Blade    Bleeding:  Minimal  Patient tolerance:  Patient tolerated the procedure well with no immediate complications     No cultures were taken during this visit

## 2022-03-16 ENCOUNTER — HOSPITAL ENCOUNTER (OUTPATIENT)
Dept: WOUND CARE | Facility: HOSPITAL | Age: 62
Discharge: HOME | End: 2022-03-16
Attending: INTERNAL MEDICINE
Payer: COMMERCIAL

## 2022-03-16 DIAGNOSIS — L97.512 ULCER OF GREAT TOE, RIGHT, WITH FAT LAYER EXPOSED: ICD-10-CM

## 2022-03-16 PROCEDURE — 93923 UPR/LXTR ART STDY 3+ LVLS: CPT | Mod: CCAT

## 2022-03-16 NOTE — PROGRESS NOTES
[facility]          Hyperbaric and Wound Medicine Department    TCOMS:     Transcutaneous oximetry was performed in the supine position with the head of the bed elevated to 30 degrees.   Measurements are in mmHg.          Location   1 YOANA Air 1 YOANA 100% O2     Lead 1  Left anterior Chest  71 mmhg 338 mmhg           Lead 2  R med lower ankle  64 mmhg 352 mmhg                   Lead 3  R med ankle   79 mmhg 220 mmhg    Lead 4  R dorsal ft   80 mmhg 416 mmhg                    Comment:  Patient in supine position.  Good lead placement.  No edema noted.  Patient tolerated procedure well.  Results forwarded to Dr. Bennett and Dr. Nicole for interpretation.  MAXINE Isidro    Impression:    Good response  With o2 challenge , normal vascular response , no evidence of pvd.

## 2022-03-31 ENCOUNTER — EXTERNAL HOME HEALTH (OUTPATIENT)
Dept: HOME HEALTH SERVICES | Facility: HOSPITAL | Age: 62
End: 2022-03-31
Payer: COMMERCIAL

## 2022-04-11 NOTE — PROGRESS NOTES
Subjective:   Patient ID:  Santiago Lucia is a 61 y.o. male who presents for follow-up of cardiac clearance in the setting of known CAD s/p PCI in 2013    HPI:   Patient is here for follow up  He stated that he is doing well  The wound healed now     No chest pain, no palpitations. He is active   Hx of hip arthroplasty for AVN.     He has a wound on the RT great Toe that he is following with wound care. Healed now. TCPOs above healing threshold. He has palpable DP and PT by physical exam.       U/S arterial 10/2017 Lower extremity extremity arterial Doppler evaluation appears within normal limits. Vasculature is patent without evidence of significant stenoses    U/S art 10/1/2020  No abnormal velocity doubling to suggest hemodynamically significant stenosis.     Monophasic waveforms of the lower extremity calf vessels and deep profundus artery which can be seen with vessel hardening related to atherosclerosis.  TCpo2  3/16/2022  Chest                                      71 mmhg         338   R med lower ankle                  64 mmhg         352 mmhg                                                                                                                       R med ankle                            79 mmhg         220 mmhg     R dorsal ft                                80 mmhg         416 mmhg                       TCPO2 12/29/2020  Room Air------O2 Challenge     Chest Wall   Right dorsal foot   Right medial foot       C 1/2013  1.      Mild Infero-basal and lateral hypokinesis with EF 45-50%   2.      CAD as described:   3.      LM normal   4.      LAD normal   5.      LCx 95% stenosis   6.      RCA dominant 95% culprit stenosis   7. Successful JACKIE to RCA  Stent Xience Rx 3.0 X 18 (JACKIE  8. Successful JACKIE to LCx  Stent Xience Rx 3.0 X 18         Echo 1/2013    1 - Normal left ventricular function (EF 55%).     2 - Diastolic dysfunction  Patient Active Problem List    Diagnosis Date  Noted    Ulcer of great toe, right, with fat layer exposed     Cellulitis of great toe of right foot 01/11/2022    Decreased range of motion of hip 04/06/2021    Decreased strength 04/06/2021    Decreased functional mobility and endurance 04/06/2021    ED (erectile dysfunction) of organic origin 08/04/2020    Pain in joint involving pelvic region and thigh 08/04/2020    Polyneuropathy due to type 2 diabetes mellitus 08/04/2020    Type 2 diabetes mellitus with diabetic peripheral angiopathy without gangrene 08/04/2020    Ingrowing toenail 01/09/2020    Avascular necrosis of bone of hip, left 10/30/2018    Coronary artery disease 11/10/2017    Infection of nail bed of toe of right foot 11/07/2017    PVD (peripheral vascular disease) 10/26/2017    Status post right hip replacement 10/26/2017    S/P angioplasty with stent 11/08/2013    NSTEMI (non-ST elevated myocardial infarction) 01/17/2013    HTN (hypertension) 01/17/2013    Dyslipidemia 01/17/2013    CAD (coronary artery disease) 01/17/2013       Patient's Medications   New Prescriptions    No medications on file   Previous Medications    ASCORBIC ACID, VITAMIN C, (VITAMIN C) 500 MG TABLET    Take 500 mg by mouth once daily.    ASPIRIN (ECOTRIN) 81 MG EC TABLET    Take 1 tablet (81 mg total) by mouth once daily.    COLLAGEN MISC    Take 1 tablet by mouth once daily.    DICLOFENAC (VOLTAREN) 75 MG EC TABLET    Take 1 tablet (75 mg total) by mouth 2 (two) times daily.    HYDROCODONE-ACETAMINOPHEN (NORCO) 7.5-325 MG PER TABLET    Take 1 tablet by mouth every 12 (twelve) hours as needed for Pain.    LOSARTAN (COZAAR) 25 MG TABLET    Take 1 tablet (25 mg total) by mouth once daily.    MAGNESIUM OXIDE (MAG-OX) 400 MG (241.3 MG MAGNESIUM) TABLET    Take 400 mg by mouth once daily.    METOPROLOL SUCCINATE (TOPROL-XL) 25 MG 24 HR TABLET    TAKE 1 TABLET(25 MG) BY MOUTH EVERY DAY    ROSUVASTATIN (CRESTOR) 10 MG TABLET    TAKE 1 TABLET(10 MG) BY MOUTH EVERY  DAY    TADALAFIL (CIALIS) 20 MG TAB    Take 1 tablet (20 mg total) by mouth as needed.    VITAMIN D (VITAMIN D3) 1000 UNITS TAB    Take 1,000 Units by mouth once daily.    ZINC SULFATE (ZINCATE) 50 MG ZINC (220 MG) CAPSULE    Take 220 mg by mouth once daily.   Modified Medications    Modified Medication Previous Medication    RIVAROXABAN (XARELTO) 2.5 MG TAB rivaroxaban (XARELTO) 2.5 mg Tab       Take 1 tablet (2.5 mg total) by mouth 2 (two) times daily with meals.    Take 1 tablet (2.5 mg total) by mouth 2 (two) times daily with meals.   Discontinued Medications    No medications on file         Review of Systems   Constitutional: Negative for chills and fever.   HENT: Negative for hearing loss and nosebleeds.    Eyes: Negative for blurred vision.   Cardiovascular: Negative for chest pain, leg swelling and palpitations.   Respiratory: Negative for hemoptysis and shortness of breath.    Hematologic/Lymphatic: Negative for bleeding problem.   Skin: Negative for itching.        As in HPI   Musculoskeletal: Positive for arthritis and joint pain. Negative for falls.   Gastrointestinal: Negative for abdominal pain and hematochezia.   Genitourinary: Negative for hematuria.   Neurological: Negative for dizziness and loss of balance.   Psychiatric/Behavioral: Negative for altered mental status and depression.         Objective:   Physical Exam  Constitutional:       Appearance: He is well-developed.   HENT:      Head: Normocephalic and atraumatic.   Eyes:      Conjunctiva/sclera: Conjunctivae normal.   Neck:      Vascular: No carotid bruit or JVD.   Cardiovascular:      Rate and Rhythm: Normal rate and regular rhythm.      Pulses:           Carotid pulses are 2+ on the right side and 2+ on the left side.       Radial pulses are 2+ on the right side and 2+ on the left side.        Dorsalis pedis pulses are 1+ on the right side.        Posterior tibial pulses are 2+ on the right side.      Heart sounds: Normal heart sounds.  No murmur heard.    No friction rub. No gallop.   Pulmonary:      Effort: Pulmonary effort is normal. No respiratory distress.      Breath sounds: Normal breath sounds. No stridor. No wheezing.   Musculoskeletal:      Cervical back: Neck supple.   Skin:     General: Skin is warm and dry.   Neurological:      Mental Status: He is alert and oriented to person, place, and time.   Psychiatric:         Behavior: Behavior normal.         Lab Results    Lab Results   Component Value Date     02/21/2022    K 4.2 02/21/2022     02/21/2022    CO2 23 02/21/2022    BUN 17 02/21/2022    CREATININE 0.8 02/21/2022    GLU 83 02/21/2022    HGBA1C 6.0 (H) 01/11/2022    MG 2.0 01/13/2022    AST 38 02/21/2022    ALT 77 (H) 02/21/2022    ALBUMIN 3.9 02/21/2022    PROT 6.6 02/21/2022    BILITOT 0.3 02/21/2022    WBC 10.31 02/21/2022    HGB 12.7 (L) 02/21/2022    HCT 40.4 02/21/2022    MCV 90 02/21/2022     02/21/2022    INR 0.9 01/11/2022    PSA 3.2 10/05/2021    CHOL 147 10/05/2021    HDL 44 10/05/2021    LDLCALC 78.6 10/05/2021    TRIG 122 10/05/2021    CRP 0.7 02/21/2022    BNP 38 01/15/2013       Lipid panel  Lab Results   Component Value Date    CHOL 147 10/05/2021     Lab Results   Component Value Date    HDL 44 10/05/2021     Lab Results   Component Value Date    LDLCALC 78.6 10/05/2021     Lab Results   Component Value Date    TRIG 122 10/05/2021          Cardiac Studies  Significant Imaging: Echocardiogram:   2D echo with color flow doppler:   Results for orders placed or performed in visit on 01/15/13   2D echo with color flow doppler   Result Value Ref Range    EF + QEF 55        Assessment:     1. S/P angioplasty with stent    2. Hypertension, unspecified type    3. Coronary artery disease involving native coronary artery of native heart without angina pectoris    4. PVD (peripheral vascular disease)        Plan:   - Stable CAD   - Discussed about the improtance of medication compliance  - Continue BB and  Statin   - ASA 81  - Xarelot 2.5 bid  PAD dose.   - F/U with wound care.   - No intervention needed. TCPO2 above healing threshold. The wound healed.   - Echo    -  I spent 5-10 minutes asking, assessing, assisting, arranging and advising heart healthy diet improvements. This included low-salt meals, portion control and health food alternatives. I also encourage 30 minutes of moderate exercise 3-4x a week.     Continue with current medical plan and lifestyle changes.  Return sooner for concerns or questions. If symptoms persist go to the ED    Orders Placed This Encounter   Procedures    Echo     Standing Status:   Future     Standing Expiration Date:   4/14/2023     Order Specific Question:   Release to patient     Answer:   Immediate       Follow up as scheduled. Return to clinic in 6 months   He expressed verbal understanding and agreed with the plan    Thank you for the opportunity to care for this patient. Will be available for questions if needed.

## 2022-04-13 ENCOUNTER — OFFICE VISIT (OUTPATIENT)
Dept: PODIATRY | Facility: CLINIC | Age: 62
End: 2022-04-13
Payer: COMMERCIAL

## 2022-04-13 VITALS
SYSTOLIC BLOOD PRESSURE: 144 MMHG | DIASTOLIC BLOOD PRESSURE: 93 MMHG | WEIGHT: 218 LBS | BODY MASS INDEX: 26.55 KG/M2 | HEART RATE: 81 BPM | HEIGHT: 76 IN

## 2022-04-13 DIAGNOSIS — L84 PRE-ULCERATIVE CALLUSES: Primary | ICD-10-CM

## 2022-04-13 DIAGNOSIS — Z87.2 HEALED ULCER OF RIGHT FOOT ON EXAMINATION: ICD-10-CM

## 2022-04-13 DIAGNOSIS — L85.3 DRY SKIN: ICD-10-CM

## 2022-04-13 DIAGNOSIS — E11.42 POLYNEUROPATHY DUE TO TYPE 2 DIABETES MELLITUS: ICD-10-CM

## 2022-04-13 DIAGNOSIS — E11.51 TYPE II DIABETES MELLITUS WITH PERIPHERAL CIRCULATORY DISORDER: ICD-10-CM

## 2022-04-13 DIAGNOSIS — M20.30 HALLUX MALLEUS, UNSPECIFIED LATERALITY: ICD-10-CM

## 2022-04-13 PROCEDURE — 99999 PR PBB SHADOW E&M-EST. PATIENT-LVL IV: ICD-10-PCS | Mod: PBBFAC,,, | Performed by: STUDENT IN AN ORGANIZED HEALTH CARE EDUCATION/TRAINING PROGRAM

## 2022-04-13 PROCEDURE — 99214 PR OFFICE/OUTPT VISIT, EST, LEVL IV, 30-39 MIN: ICD-10-PCS | Mod: 25,S$GLB,, | Performed by: STUDENT IN AN ORGANIZED HEALTH CARE EDUCATION/TRAINING PROGRAM

## 2022-04-13 PROCEDURE — 3077F PR MOST RECENT SYSTOLIC BLOOD PRESSURE >= 140 MM HG: ICD-10-PCS | Mod: CPTII,S$GLB,, | Performed by: STUDENT IN AN ORGANIZED HEALTH CARE EDUCATION/TRAINING PROGRAM

## 2022-04-13 PROCEDURE — 99214 OFFICE O/P EST MOD 30 MIN: CPT | Mod: 25,S$GLB,, | Performed by: STUDENT IN AN ORGANIZED HEALTH CARE EDUCATION/TRAINING PROGRAM

## 2022-04-13 PROCEDURE — 99999 PR PBB SHADOW E&M-EST. PATIENT-LVL IV: CPT | Mod: PBBFAC,,, | Performed by: STUDENT IN AN ORGANIZED HEALTH CARE EDUCATION/TRAINING PROGRAM

## 2022-04-13 PROCEDURE — 3044F HG A1C LEVEL LT 7.0%: CPT | Mod: CPTII,S$GLB,, | Performed by: STUDENT IN AN ORGANIZED HEALTH CARE EDUCATION/TRAINING PROGRAM

## 2022-04-13 PROCEDURE — 3077F SYST BP >= 140 MM HG: CPT | Mod: CPTII,S$GLB,, | Performed by: STUDENT IN AN ORGANIZED HEALTH CARE EDUCATION/TRAINING PROGRAM

## 2022-04-13 PROCEDURE — 3008F BODY MASS INDEX DOCD: CPT | Mod: CPTII,S$GLB,, | Performed by: STUDENT IN AN ORGANIZED HEALTH CARE EDUCATION/TRAINING PROGRAM

## 2022-04-13 PROCEDURE — 3080F PR MOST RECENT DIASTOLIC BLOOD PRESSURE >= 90 MM HG: ICD-10-PCS | Mod: CPTII,S$GLB,, | Performed by: STUDENT IN AN ORGANIZED HEALTH CARE EDUCATION/TRAINING PROGRAM

## 2022-04-13 PROCEDURE — 3080F DIAST BP >= 90 MM HG: CPT | Mod: CPTII,S$GLB,, | Performed by: STUDENT IN AN ORGANIZED HEALTH CARE EDUCATION/TRAINING PROGRAM

## 2022-04-13 PROCEDURE — 3044F PR MOST RECENT HEMOGLOBIN A1C LEVEL <7.0%: ICD-10-PCS | Mod: CPTII,S$GLB,, | Performed by: STUDENT IN AN ORGANIZED HEALTH CARE EDUCATION/TRAINING PROGRAM

## 2022-04-13 PROCEDURE — 3008F PR BODY MASS INDEX (BMI) DOCUMENTED: ICD-10-PCS | Mod: CPTII,S$GLB,, | Performed by: STUDENT IN AN ORGANIZED HEALTH CARE EDUCATION/TRAINING PROGRAM

## 2022-04-13 PROCEDURE — 4010F PR ACE/ARB THEARPY RXD/TAKEN: ICD-10-PCS | Mod: CPTII,S$GLB,, | Performed by: STUDENT IN AN ORGANIZED HEALTH CARE EDUCATION/TRAINING PROGRAM

## 2022-04-13 PROCEDURE — 4010F ACE/ARB THERAPY RXD/TAKEN: CPT | Mod: CPTII,S$GLB,, | Performed by: STUDENT IN AN ORGANIZED HEALTH CARE EDUCATION/TRAINING PROGRAM

## 2022-04-13 PROCEDURE — 1159F PR MEDICATION LIST DOCUMENTED IN MEDICAL RECORD: ICD-10-PCS | Mod: CPTII,S$GLB,, | Performed by: STUDENT IN AN ORGANIZED HEALTH CARE EDUCATION/TRAINING PROGRAM

## 2022-04-13 PROCEDURE — 1159F MED LIST DOCD IN RCRD: CPT | Mod: CPTII,S$GLB,, | Performed by: STUDENT IN AN ORGANIZED HEALTH CARE EDUCATION/TRAINING PROGRAM

## 2022-04-13 NOTE — PROCEDURES
Wound Debridement    Date/Time: 4/13/2022 10:30 AM  Performed by: Jackie Calderon DPM  Authorized by: Jackie Calderon DPM     Consent Done?:  Yes (Verbal)  Local anesthesia used?: No      Wound Details:    Location:  Right foot    Location:  Right 1st Toe    Type of Debridement:  Non-excisional       Length (cm):  0       Area (sq cm):  0       Width (cm):  0       Percent Debrided (%):  100       Depth (cm):  0       Total Area Debrided (sq cm):  0    Depth of debridement:  Epidermis/Dermis    Tissue debrided:  Epidermis and Dermis    Devitalized tissue debrided:  Callus    Instruments:  Blade    Bleeding:  None  Patient tolerance:  Patient tolerated the procedure well with no immediate complications     No cultures were taken during this visit

## 2022-04-13 NOTE — PROGRESS NOTES
Subjective:      Patient ID: Santiago Lucia is a 61 y.o. male.    Chief Complaint: Wound Check (1 month f/u wound check)    Santiago is a 61 y.o. male who presents to the clinic for evaluation and treatment of high risk feet. Santiago has a past medical history of Avascular necrosis of bone of hip, left (10/30/2018), Coronary artery disease, DM w/o complication type II (8/9/2013), and NSTEMI (non-ST elevated myocardial infarction) (01/2013). The patient's chief complaint is foot ulcer, right foot. This patient has documented high risk feet requiring routine maintenance secondary to peripheral neuropathy. Recent admission for abscess and recent MRI positive for osteomyelitis. Currently treated with 6 week of antibiotics per Infectious Disease recommendation. Relates to mild pain. No other pedal complaints.     2/9/22: Pt seen today for follow up of right foot wound, no new pedal complaints.     2/23/22: Pt seen today for follow up of right foot wound, has callus x2 to left foot as well. No other pedal complaints.     3/9/22: Pt seen today for follow up of right foot and callus x2 to left foot. No other pedal complaints.     4/13/22: Pt seen today for follow up of right foot pre-ulcerative callus. No other pedal complaints.     PCP: Yon Asif MD    Date Last Seen by PCP:     Current shoe gear:  Affected Foot: football with tall boot.      Unaffected Foot: Tennis shoes    History of Trauma: negative  Sign of Infection: none    Hemoglobin A1C   Date Value Ref Range Status   01/11/2022 6.0 (H) 4.0 - 5.6 % Final     Comment:     ADA Screening Guidelines:  5.7-6.4%  Consistent with prediabetes  >or=6.5%  Consistent with diabetes    High levels of fetal hemoglobin interfere with the HbA1C  assay. Heterozygous hemoglobin variants (HbS, HgC, etc)do  not significantly interfere with this assay.   However, presence of multiple variants may affect accuracy.     07/30/2021 6.0 (H) 4.0 - 5.6 % Final     Comment:     ADA  Screening Guidelines:  5.7-6.4%  Consistent with prediabetes  >or=6.5%  Consistent with diabetes    High levels of fetal hemoglobin interfere with the HbA1C  assay. Heterozygous hemoglobin variants (HbS, HgC, etc)do  not significantly interfere with this assay.   However, presence of multiple variants may affect accuracy.     01/16/2013 7.1 (H) 4.0 - 6.2 % Final       Review of Systems   Constitutional: Negative for chills, decreased appetite, diaphoresis and fever.   HENT: Negative for congestion and hearing loss.    Cardiovascular: Negative for chest pain, claudication, leg swelling and syncope.   Respiratory: Negative for cough and shortness of breath.    Skin: Positive for dry skin, nail changes and poor wound healing. Negative for color change, flushing, itching and rash.   Musculoskeletal: Positive for arthritis, back pain and joint pain. Negative for joint swelling.   Gastrointestinal: Negative for nausea and vomiting.   Neurological: Positive for numbness. Negative for paresthesias.   Psychiatric/Behavioral: Negative for altered mental status. The patient is not nervous/anxious.            Objective:      Physical Exam  Constitutional:       General: He is not in acute distress.     Appearance: Normal appearance. He is well-developed. He is not diaphoretic.   Cardiovascular:      Comments: Dorsalis pedis and posterior tibial pulses are mildly diminished. Skin temperature is within normal limits. Toes are cool to touch and feet are warm proximally. Hair growth is diminished. Skin is atrophic and with mild hyperpigmentation. No edema noted, bilaterally.   Musculoskeletal:         General: No tenderness.      Comments: Adequate joint range of motion without pain, limitation, nor crepitation to foot and ankle joints. Muscle strength is 5/5 in all groups bilaterally.    Hallux malleus to right great toe   Feet:      Right foot:      Skin integrity: Dry skin present. No ulcer, blister, erythema or warmth.       Left foot:      Skin integrity: Dry skin present. No ulcer, blister, erythema or warmth.   Skin:     General: Skin is warm and dry.      Capillary Refill: Capillary refill takes less than 2 seconds.      Comments: Skin is warm and dry, no acute signs of infection noted bilaterally      Toenails are thickened by 2-4 mm's, dystrophic, and are darkened in coloration with subungual fungal debris.     Previous ulcer with diffuse overlying callus to dorsal right hallux, upon debridement, wound is healed.     Otherwise, no open wounds, macerations or hyperkeratotic lesions, bilaterally            Neurological:      Mental Status: He is alert and oriented to person, place, and time.      Sensory: Sensory deficit present.      Motor: No abnormal muscle tone.      Comments: Light touch within normal limits.    Psychiatric:         Mood and Affect: Mood normal.         Behavior: Behavior normal.         Thought Content: Thought content normal.       Wound remains healed.     Previous Images:      Previous Images:                  Assessment:       Encounter Diagnoses   Name Primary?    Pre-ulcerative calluses Yes    Polyneuropathy due to type 2 diabetes mellitus     Dry skin     Type II diabetes mellitus with peripheral circulatory disorder     Hallux malleus, unspecified laterality     Healed ulcer of right foot on examination          Plan:       Santiago was seen today for wound check.    Diagnoses and all orders for this visit:    Pre-ulcerative calluses  -     Wound Debridement    Polyneuropathy due to type 2 diabetes mellitus    Dry skin    Type II diabetes mellitus with peripheral circulatory disorder    Hallux malleus, unspecified laterality    Healed ulcer of right foot on examination  -     Wound Debridement      I counseled the patient on his conditions, their implications and medical management.    -Wound debridement performed, wound is healed. Continue supportive tennis shoe.     - Previously rx diabetic  shoes. Pt to wear at all times while ambulating with frequent daily foot checks.     -Continue follow up with Cardiology, previous TCOMS normal    -Rest, elevate. Strict offloading to wound site.     -Shoe inspection. General Foot Education. Patient instructed on proper foot hygeine. We discussed wearing proper shoe gear, daily foot inspections, never walking without protective shoe gear, never putting sharp instruments to feet. Patient reminded of the importance of good nutrition, weight loss if needed to help alleviate foot pressure/pain     -Discussed general foot care:  Wear comfortable, proper fitting shoes. Wash feet daily. Dry well. After drying, apply moisturizer to feet (no lotion to webspaces). Inspect feet daily for skin breaks, blisters, swelling, or redness. Wear cotton socks (preferably white)  Change socks every day. Do NOT walk barefoot. Do NOT use heating pads or warm/hot water soaks. I discussed with the  patient  signs and symptoms of infection including redness, drainage, purulence, odor, pain, elevated BS, streaking, fever, chills, etc . Patient is to seek medical attention (ER or urgent care) if these symptoms occur       -RTC in 1 mo, sooner PRN

## 2022-04-14 ENCOUNTER — OFFICE VISIT (OUTPATIENT)
Dept: CARDIOLOGY | Facility: CLINIC | Age: 62
End: 2022-04-14
Payer: COMMERCIAL

## 2022-04-14 VITALS
BODY MASS INDEX: 26.66 KG/M2 | OXYGEN SATURATION: 99 % | WEIGHT: 218.94 LBS | DIASTOLIC BLOOD PRESSURE: 83 MMHG | HEART RATE: 75 BPM | HEIGHT: 76 IN | SYSTOLIC BLOOD PRESSURE: 132 MMHG

## 2022-04-14 DIAGNOSIS — I25.10 CORONARY ARTERY DISEASE INVOLVING NATIVE CORONARY ARTERY OF NATIVE HEART WITHOUT ANGINA PECTORIS: ICD-10-CM

## 2022-04-14 DIAGNOSIS — I73.9 PVD (PERIPHERAL VASCULAR DISEASE): ICD-10-CM

## 2022-04-14 DIAGNOSIS — Z95.820 S/P ANGIOPLASTY WITH STENT: Primary | ICD-10-CM

## 2022-04-14 DIAGNOSIS — I10 HYPERTENSION, UNSPECIFIED TYPE: ICD-10-CM

## 2022-04-14 PROCEDURE — 3079F PR MOST RECENT DIASTOLIC BLOOD PRESSURE 80-89 MM HG: ICD-10-PCS | Mod: CPTII,S$GLB,, | Performed by: INTERNAL MEDICINE

## 2022-04-14 PROCEDURE — 3075F SYST BP GE 130 - 139MM HG: CPT | Mod: CPTII,S$GLB,, | Performed by: INTERNAL MEDICINE

## 2022-04-14 PROCEDURE — 4010F PR ACE/ARB THEARPY RXD/TAKEN: ICD-10-PCS | Mod: CPTII,S$GLB,, | Performed by: INTERNAL MEDICINE

## 2022-04-14 PROCEDURE — 99214 OFFICE O/P EST MOD 30 MIN: CPT | Mod: S$GLB,,, | Performed by: INTERNAL MEDICINE

## 2022-04-14 PROCEDURE — 3075F PR MOST RECENT SYSTOLIC BLOOD PRESS GE 130-139MM HG: ICD-10-PCS | Mod: CPTII,S$GLB,, | Performed by: INTERNAL MEDICINE

## 2022-04-14 PROCEDURE — 3044F HG A1C LEVEL LT 7.0%: CPT | Mod: CPTII,S$GLB,, | Performed by: INTERNAL MEDICINE

## 2022-04-14 PROCEDURE — 1159F MED LIST DOCD IN RCRD: CPT | Mod: CPTII,S$GLB,, | Performed by: INTERNAL MEDICINE

## 2022-04-14 PROCEDURE — 4010F ACE/ARB THERAPY RXD/TAKEN: CPT | Mod: CPTII,S$GLB,, | Performed by: INTERNAL MEDICINE

## 2022-04-14 PROCEDURE — 99214 PR OFFICE/OUTPT VISIT, EST, LEVL IV, 30-39 MIN: ICD-10-PCS | Mod: S$GLB,,, | Performed by: INTERNAL MEDICINE

## 2022-04-14 PROCEDURE — 3008F PR BODY MASS INDEX (BMI) DOCUMENTED: ICD-10-PCS | Mod: CPTII,S$GLB,, | Performed by: INTERNAL MEDICINE

## 2022-04-14 PROCEDURE — 99999 PR PBB SHADOW E&M-EST. PATIENT-LVL III: CPT | Mod: PBBFAC,,, | Performed by: INTERNAL MEDICINE

## 2022-04-14 PROCEDURE — 3044F PR MOST RECENT HEMOGLOBIN A1C LEVEL <7.0%: ICD-10-PCS | Mod: CPTII,S$GLB,, | Performed by: INTERNAL MEDICINE

## 2022-04-14 PROCEDURE — 1159F PR MEDICATION LIST DOCUMENTED IN MEDICAL RECORD: ICD-10-PCS | Mod: CPTII,S$GLB,, | Performed by: INTERNAL MEDICINE

## 2022-04-14 PROCEDURE — 3008F BODY MASS INDEX DOCD: CPT | Mod: CPTII,S$GLB,, | Performed by: INTERNAL MEDICINE

## 2022-04-14 PROCEDURE — 99999 PR PBB SHADOW E&M-EST. PATIENT-LVL III: ICD-10-PCS | Mod: PBBFAC,,, | Performed by: INTERNAL MEDICINE

## 2022-04-14 PROCEDURE — 3079F DIAST BP 80-89 MM HG: CPT | Mod: CPTII,S$GLB,, | Performed by: INTERNAL MEDICINE

## 2022-04-19 ENCOUNTER — PATIENT MESSAGE (OUTPATIENT)
Dept: PODIATRY | Facility: CLINIC | Age: 62
End: 2022-04-19
Payer: COMMERCIAL

## 2022-04-20 ENCOUNTER — PATIENT MESSAGE (OUTPATIENT)
Dept: PODIATRY | Facility: CLINIC | Age: 62
End: 2022-04-20
Payer: COMMERCIAL

## 2022-04-25 ENCOUNTER — HOSPITAL ENCOUNTER (OUTPATIENT)
Dept: CARDIOLOGY | Facility: HOSPITAL | Age: 62
Discharge: HOME | End: 2022-04-25
Attending: INTERNAL MEDICINE
Payer: COMMERCIAL

## 2022-04-25 VITALS — WEIGHT: 218 LBS | BODY MASS INDEX: 26.55 KG/M2 | HEIGHT: 76 IN

## 2022-04-25 DIAGNOSIS — I25.10 CORONARY ARTERY DISEASE INVOLVING NATIVE CORONARY ARTERY OF NATIVE HEART WITHOUT ANGINA PECTORIS: ICD-10-CM

## 2022-04-25 DIAGNOSIS — I10 HYPERTENSION, UNSPECIFIED TYPE: ICD-10-CM

## 2022-04-25 DIAGNOSIS — Z95.820 S/P ANGIOPLASTY WITH STENT: ICD-10-CM

## 2022-04-25 LAB
AORTIC ROOT ANNULUS: 3.73 CM
AV INDEX (PROSTH): 0.81
AV MEAN GRADIENT: 3 MMHG
AV PEAK GRADIENT: 6 MMHG
AV VALVE AREA: 3.9 CM2
AV VELOCITY RATIO: 0.74
BSA FOR ECHO PROCEDURE: 2.3 M2
CV ECHO LV RWT: 0.49 CM
DOP CALC AO PEAK VEL: 1.2 M/S
DOP CALC AO VTI: 27.64 CM
DOP CALC LVOT AREA: 4.8 CM2
DOP CALC LVOT DIAMETER: 2.47 CM
DOP CALC LVOT PEAK VEL: 0.89 M/S
DOP CALC LVOT STROKE VOLUME: 107.85 CM3
DOP CALC MV VTI: 28.18 CM
DOP CALCLVOT PEAK VEL VTI: 22.52 CM
E WAVE DECELERATION TIME: 203.44 MSEC
E/A RATIO: 1.16
E/E' RATIO: 11 M/S
ECHO LV POSTERIOR WALL: 1.1 CM (ref 0.6–1.1)
EJECTION FRACTION: 65 %
FRACTIONAL SHORTENING: 33 % (ref 28–44)
INTERVENTRICULAR SEPTUM: 1.15 CM (ref 0.6–1.1)
LA MAJOR: 4.54 CM
LA MINOR: 5.21 CM
LA WIDTH: 4.08 CM
LEFT ATRIUM SIZE: 3.08 CM
LEFT ATRIUM VOLUME INDEX MOD: 16.3 ML/M2
LEFT ATRIUM VOLUME INDEX: 22.5 ML/M2
LEFT ATRIUM VOLUME MOD: 37.57 CM3
LEFT ATRIUM VOLUME: 51.83 CM3
LEFT INTERNAL DIMENSION IN SYSTOLE: 3.01 CM (ref 2.1–4)
LEFT VENTRICLE DIASTOLIC VOLUME INDEX: 40.23 ML/M2
LEFT VENTRICLE DIASTOLIC VOLUME: 92.52 ML
LEFT VENTRICLE MASS INDEX: 79 G/M2
LEFT VENTRICLE SYSTOLIC VOLUME INDEX: 15.4 ML/M2
LEFT VENTRICLE SYSTOLIC VOLUME: 35.41 ML
LEFT VENTRICULAR INTERNAL DIMENSION IN DIASTOLE: 4.5 CM (ref 3.5–6)
LEFT VENTRICULAR MASS: 180.66 G
LV LATERAL E/E' RATIO: 10 M/S
LV SEPTAL E/E' RATIO: 12.22 M/S
MV A" WAVE DURATION": 9.42 MSEC
MV MEAN GRADIENT: 1 MMHG
MV PEAK A VEL: 0.95 M/S
MV PEAK E VEL: 1.1 M/S
MV PEAK GRADIENT: 5 MMHG
MV STENOSIS PRESSURE HALF TIME: 59 MS
MV VALVE AREA BY CONTINUITY EQUATION: 3.83 CM2
MV VALVE AREA P 1/2 METHOD: 3.73 CM2
PULM VEIN S/D RATIO: 1.23
PV PEAK D VEL: 0.43 M/S
PV PEAK S VEL: 0.53 M/S
PV PEAK VELOCITY: 0.84 CM/S
RA MAJOR: 4.45 CM
RA PRESSURE: 3 MMHG
RA WIDTH: 3.88 CM
RIGHT VENTRICULAR END-DIASTOLIC DIMENSION: 3.22 CM
RV TISSUE DOPPLER FREE WALL SYSTOLIC VELOCITY 1 (APICAL 4 CHAMBER VIEW): 14.13 CM/S
TDI LATERAL: 0.11 M/S
TDI SEPTAL: 0.09 M/S
TDI: 0.1 M/S
TRICUSPID ANNULAR PLANE SYSTOLIC EXCURSION: 2.2 CM

## 2022-04-25 PROCEDURE — 93306 TTE W/DOPPLER COMPLETE: CPT

## 2022-04-25 PROCEDURE — 93306 TTE W/DOPPLER COMPLETE: CPT | Mod: 26,,, | Performed by: INTERNAL MEDICINE

## 2022-04-25 PROCEDURE — 93306 ECHO (CUPID ONLY): ICD-10-PCS | Mod: 26,,, | Performed by: INTERNAL MEDICINE

## 2022-04-28 ENCOUNTER — PATIENT MESSAGE (OUTPATIENT)
Dept: CARDIOLOGY | Facility: CLINIC | Age: 62
End: 2022-04-28
Payer: COMMERCIAL

## 2022-05-11 ENCOUNTER — OFFICE VISIT (OUTPATIENT)
Dept: PODIATRY | Facility: CLINIC | Age: 62
End: 2022-05-11
Payer: COMMERCIAL

## 2022-05-11 VITALS
SYSTOLIC BLOOD PRESSURE: 155 MMHG | HEART RATE: 67 BPM | HEIGHT: 76 IN | BODY MASS INDEX: 26.54 KG/M2 | DIASTOLIC BLOOD PRESSURE: 87 MMHG

## 2022-05-11 DIAGNOSIS — B35.1 ONYCHOMYCOSIS: ICD-10-CM

## 2022-05-11 DIAGNOSIS — M20.30 HALLUX MALLEUS, UNSPECIFIED LATERALITY: ICD-10-CM

## 2022-05-11 DIAGNOSIS — E11.42 POLYNEUROPATHY DUE TO TYPE 2 DIABETES MELLITUS: ICD-10-CM

## 2022-05-11 DIAGNOSIS — L85.3 DRY SKIN: ICD-10-CM

## 2022-05-11 DIAGNOSIS — Z87.2 HEALED ULCER OF RIGHT FOOT ON EXAMINATION: ICD-10-CM

## 2022-05-11 DIAGNOSIS — L84 PRE-ULCERATIVE CALLUSES: Primary | ICD-10-CM

## 2022-05-11 DIAGNOSIS — L84 CORN OR CALLUS: ICD-10-CM

## 2022-05-11 PROCEDURE — 11721 DEBRIDE NAIL 6 OR MORE: CPT | Mod: 59,S$GLB,, | Performed by: STUDENT IN AN ORGANIZED HEALTH CARE EDUCATION/TRAINING PROGRAM

## 2022-05-11 PROCEDURE — 99999 PR PBB SHADOW E&M-EST. PATIENT-LVL III: CPT | Mod: PBBFAC,,, | Performed by: STUDENT IN AN ORGANIZED HEALTH CARE EDUCATION/TRAINING PROGRAM

## 2022-05-11 PROCEDURE — 11057 PARNG/CUTG B9 HYPRKR LES >4: CPT | Mod: S$GLB,,, | Performed by: STUDENT IN AN ORGANIZED HEALTH CARE EDUCATION/TRAINING PROGRAM

## 2022-05-11 PROCEDURE — 99999 PR PBB SHADOW E&M-EST. PATIENT-LVL III: ICD-10-PCS | Mod: PBBFAC,,, | Performed by: STUDENT IN AN ORGANIZED HEALTH CARE EDUCATION/TRAINING PROGRAM

## 2022-05-11 PROCEDURE — 99214 PR OFFICE/OUTPT VISIT, EST, LEVL IV, 30-39 MIN: ICD-10-PCS | Mod: 25,S$GLB,, | Performed by: STUDENT IN AN ORGANIZED HEALTH CARE EDUCATION/TRAINING PROGRAM

## 2022-05-11 PROCEDURE — 3044F PR MOST RECENT HEMOGLOBIN A1C LEVEL <7.0%: ICD-10-PCS | Mod: CPTII,S$GLB,, | Performed by: STUDENT IN AN ORGANIZED HEALTH CARE EDUCATION/TRAINING PROGRAM

## 2022-05-11 PROCEDURE — 1159F PR MEDICATION LIST DOCUMENTED IN MEDICAL RECORD: ICD-10-PCS | Mod: CPTII,S$GLB,, | Performed by: STUDENT IN AN ORGANIZED HEALTH CARE EDUCATION/TRAINING PROGRAM

## 2022-05-11 PROCEDURE — 3077F PR MOST RECENT SYSTOLIC BLOOD PRESSURE >= 140 MM HG: ICD-10-PCS | Mod: CPTII,S$GLB,, | Performed by: STUDENT IN AN ORGANIZED HEALTH CARE EDUCATION/TRAINING PROGRAM

## 2022-05-11 PROCEDURE — 4010F PR ACE/ARB THEARPY RXD/TAKEN: ICD-10-PCS | Mod: CPTII,S$GLB,, | Performed by: STUDENT IN AN ORGANIZED HEALTH CARE EDUCATION/TRAINING PROGRAM

## 2022-05-11 PROCEDURE — 99214 OFFICE O/P EST MOD 30 MIN: CPT | Mod: 25,S$GLB,, | Performed by: STUDENT IN AN ORGANIZED HEALTH CARE EDUCATION/TRAINING PROGRAM

## 2022-05-11 PROCEDURE — 3079F PR MOST RECENT DIASTOLIC BLOOD PRESSURE 80-89 MM HG: ICD-10-PCS | Mod: CPTII,S$GLB,, | Performed by: STUDENT IN AN ORGANIZED HEALTH CARE EDUCATION/TRAINING PROGRAM

## 2022-05-11 PROCEDURE — 11721 ROUTINE FOOT CARE: ICD-10-PCS | Mod: 59,S$GLB,, | Performed by: STUDENT IN AN ORGANIZED HEALTH CARE EDUCATION/TRAINING PROGRAM

## 2022-05-11 PROCEDURE — 3008F PR BODY MASS INDEX (BMI) DOCUMENTED: ICD-10-PCS | Mod: CPTII,S$GLB,, | Performed by: STUDENT IN AN ORGANIZED HEALTH CARE EDUCATION/TRAINING PROGRAM

## 2022-05-11 PROCEDURE — 3077F SYST BP >= 140 MM HG: CPT | Mod: CPTII,S$GLB,, | Performed by: STUDENT IN AN ORGANIZED HEALTH CARE EDUCATION/TRAINING PROGRAM

## 2022-05-11 PROCEDURE — 4010F ACE/ARB THERAPY RXD/TAKEN: CPT | Mod: CPTII,S$GLB,, | Performed by: STUDENT IN AN ORGANIZED HEALTH CARE EDUCATION/TRAINING PROGRAM

## 2022-05-11 PROCEDURE — 11057 ROUTINE FOOT CARE: ICD-10-PCS | Mod: S$GLB,,, | Performed by: STUDENT IN AN ORGANIZED HEALTH CARE EDUCATION/TRAINING PROGRAM

## 2022-05-11 PROCEDURE — 3044F HG A1C LEVEL LT 7.0%: CPT | Mod: CPTII,S$GLB,, | Performed by: STUDENT IN AN ORGANIZED HEALTH CARE EDUCATION/TRAINING PROGRAM

## 2022-05-11 PROCEDURE — 1159F MED LIST DOCD IN RCRD: CPT | Mod: CPTII,S$GLB,, | Performed by: STUDENT IN AN ORGANIZED HEALTH CARE EDUCATION/TRAINING PROGRAM

## 2022-05-11 PROCEDURE — 3008F BODY MASS INDEX DOCD: CPT | Mod: CPTII,S$GLB,, | Performed by: STUDENT IN AN ORGANIZED HEALTH CARE EDUCATION/TRAINING PROGRAM

## 2022-05-11 PROCEDURE — 3079F DIAST BP 80-89 MM HG: CPT | Mod: CPTII,S$GLB,, | Performed by: STUDENT IN AN ORGANIZED HEALTH CARE EDUCATION/TRAINING PROGRAM

## 2022-05-11 NOTE — PROGRESS NOTES
Subjective:      Patient ID: Santiago Lucia is a 61 y.o. male.    Chief Complaint: Foot Problem, Foot Ulcer (Right great toe ulcer f/u), and Follow-up    Santiago is a 61 y.o. male who presents to the clinic for evaluation and treatment of high risk feet. Santiago has a past medical history of Avascular necrosis of bone of hip, left (10/30/2018), Coronary artery disease, DM w/o complication type II (8/9/2013), and NSTEMI (non-ST elevated myocardial infarction) (01/2013). The patient's chief complaint is foot ulcer, right foot. This patient has documented high risk feet requiring routine maintenance secondary to peripheral neuropathy. Recent admission for abscess and recent MRI positive for osteomyelitis. Currently treated with 6 week of antibiotics per Infectious Disease recommendation. Relates to mild pain. No other pedal complaints.     2/9/22: Pt seen today for follow up of right foot wound, no new pedal complaints.     2/23/22: Pt seen today for follow up of right foot wound, has callus x2 to left foot as well. No other pedal complaints.     3/9/22: Pt seen today for follow up of right foot and callus x2 to left foot. No other pedal complaints.     4/13/22: Pt seen today for follow up of right foot pre-ulcerative callus. No other pedal complaints.     5/11/22: Pt seen today for RFC and right foot check. No no new pedal complaint. States he has started working again and has been on his feet more.     PCP: Yon Asif MD    Date Last Seen by PCP:     Current shoe gear:  Affected Foot: football with tall boot.      Unaffected Foot: Tennis shoes    History of Trauma: negative  Sign of Infection: none    Hemoglobin A1C   Date Value Ref Range Status   01/11/2022 6.0 (H) 4.0 - 5.6 % Final     Comment:     ADA Screening Guidelines:  5.7-6.4%  Consistent with prediabetes  >or=6.5%  Consistent with diabetes    High levels of fetal hemoglobin interfere with the HbA1C  assay. Heterozygous hemoglobin variants (HbS, HgC,  etc)do  not significantly interfere with this assay.   However, presence of multiple variants may affect accuracy.     07/30/2021 6.0 (H) 4.0 - 5.6 % Final     Comment:     ADA Screening Guidelines:  5.7-6.4%  Consistent with prediabetes  >or=6.5%  Consistent with diabetes    High levels of fetal hemoglobin interfere with the HbA1C  assay. Heterozygous hemoglobin variants (HbS, HgC, etc)do  not significantly interfere with this assay.   However, presence of multiple variants may affect accuracy.     01/16/2013 7.1 (H) 4.0 - 6.2 % Final       Review of Systems   Constitutional: Negative for chills, decreased appetite, diaphoresis and fever.   HENT: Negative for congestion and hearing loss.    Cardiovascular: Negative for chest pain, claudication, leg swelling and syncope.   Respiratory: Negative for cough and shortness of breath.    Skin: Positive for dry skin, nail changes and poor wound healing. Negative for color change, flushing, itching and rash.   Musculoskeletal: Positive for arthritis, back pain and joint pain. Negative for joint swelling.   Gastrointestinal: Negative for nausea and vomiting.   Neurological: Positive for numbness. Negative for paresthesias.   Psychiatric/Behavioral: Negative for altered mental status. The patient is not nervous/anxious.            Objective:      Physical Exam  Constitutional:       General: He is not in acute distress.     Appearance: Normal appearance. He is well-developed. He is not diaphoretic.   Cardiovascular:      Comments: Dorsalis pedis and posterior tibial pulses are mildly diminished. Skin temperature is within normal limits. Toes are cool to touch and feet are warm proximally. Hair growth is diminished. Skin is atrophic and with mild hyperpigmentation. No edema noted, bilaterally.   Musculoskeletal:         General: No tenderness.      Comments: Adequate joint range of motion without pain, limitation, nor crepitation to foot and ankle joints. Muscle strength is  5/5 in all groups bilaterally.    Hallux malleus to right great toe   Feet:      Right foot:      Skin integrity: Dry skin present. No ulcer, blister, erythema or warmth.      Left foot:      Skin integrity: Dry skin present. No ulcer, blister, erythema or warmth.   Skin:     General: Skin is warm and dry.      Capillary Refill: Capillary refill takes less than 2 seconds.      Comments: Skin is warm and dry, no acute signs of infection noted bilaterally      Toenails are thickened by 2-4 mm's, dystrophic, and are darkened in coloration with subungual fungal debris.     Previous ulcer with diffuse overlying callus to dorsal right hallux, upon debridement, wound is healed.     Callus noted to medial IPJ and MTP of hallux. Callus noted to lateral 5th digit, bilaterally     Otherwise, no open wounds, macerations or hyperkeratotic lesions, bilaterally            Neurological:      Mental Status: He is alert and oriented to person, place, and time.      Sensory: Sensory deficit present.      Motor: No abnormal muscle tone.      Comments: Light touch within normal limits.    Psychiatric:         Mood and Affect: Mood normal.         Behavior: Behavior normal.         Thought Content: Thought content normal.       Wound remains healed.     Previous Images:      Previous Images:                  Assessment:       Encounter Diagnoses   Name Primary?    Pre-ulcerative calluses Yes    Polyneuropathy due to type 2 diabetes mellitus     Dry skin     Healed ulcer of right foot on examination     Hallux malleus, unspecified laterality     Corn or callus     Onychomycosis          Plan:       Santiago was seen today for foot problem, foot ulcer and follow-up.    Diagnoses and all orders for this visit:    Pre-ulcerative calluses  -     Routine Foot Care  -     Wound Debridement    Polyneuropathy due to type 2 diabetes mellitus  -     Routine Foot Care    Dry skin    Healed ulcer of right foot on examination  -     Routine Foot  Care  -     Wound Debridement    Hallux malleus, unspecified laterality  -     Wound Debridement    Corn or callus  -     Routine Foot Care    Onychomycosis  -     Routine Foot Care      I counseled the patient on his conditions, their implications and medical management.     -RFC per attached note. Recommend pumice stone or urea 40% cream to soften callus. Can find on [employer]    -Wound debridement performed, wound is healed. Continue supportive tennis shoe.     -Previously rx diabetic shoes. Pt to wear at all times while ambulating with frequent daily foot checks.     -Continue follow up with Cardiology, previous TCOMS normal    -Rest, elevate. Strict offloading to wound site.     -Shoe inspection. General Foot Education. Patient instructed on proper foot hygeine. We discussed wearing proper shoe gear, daily foot inspections, never walking without protective shoe gear, never putting sharp instruments to feet. Patient reminded of the importance of good nutrition, weight loss if needed to help alleviate foot pressure/pain     -Discussed general foot care:  Wear comfortable, proper fitting shoes. Wash feet daily. Dry well. After drying, apply moisturizer to feet (no lotion to webspaces). Inspect feet daily for skin breaks, blisters, swelling, or redness. Wear cotton socks (preferably white)  Change socks every day. Do NOT walk barefoot. Do NOT use heating pads or warm/hot water soaks. I discussed with the  patient  signs and symptoms of infection including redness, drainage, purulence, odor, pain, elevated BS, streaking, fever, chills, etc . Patient is to seek medical attention (ER or urgent care) if these symptoms occur       -RTC in 1 mo, sooner PRN

## 2022-05-11 NOTE — PROCEDURES
Wound Debridement    Date/Time: 5/11/2022 10:30 AM  Performed by: Jackie Calderon DPM  Authorized by: Jackie Calderon DPM     Consent Done?:  Yes (Verbal)  Local anesthesia used?: No      Wound Details:    Location:  Right foot    Location:  Right 1st Toe    Type of Debridement:  Non-excisional       Length (cm):  0       Area (sq cm):  0       Width (cm):  0       Percent Debrided (%):  100       Depth (cm):  0       Total Area Debrided (sq cm):  0    Depth of debridement:  Epidermis/Dermis    Tissue debrided:  Epidermis and Dermis    Devitalized tissue debrided:  Callus    Instruments:  Blade    Bleeding:  None  Patient tolerance:  Patient tolerated the procedure well with no immediate complications     No cultures were taken during this visit

## 2022-05-11 NOTE — PROCEDURES
"Routine Foot Care    Date/Time: 5/11/2022 10:30 AM  Performed by: Jackie Calderon DPM  Authorized by: Jackie Calderon DPM     Time out: Immediately prior to procedure a "time out" was called to verify the correct patient, procedure, equipment, support staff and site/side marked as required.    Consent Done?:  Yes (Verbal)  Hyperkeratotic Skin Lesions?: Yes    Number of trimmed lesions:  6  Location(s):  Left 1st Toe, Left 1st Metatarsal Head, Right 1st Toe, Right 1st Metatarsal Head, Right 5th Toe and Left 5th Toe    Nail Care Type:  Debride(Left 3rd Toe, Left 2nd Toe, Left 4th Toe, Left 5th Toe, Right 2nd Toe, Right 3rd Toe, Right 4th Toe and Right 5th Toe)  Patient tolerance:  Patient tolerated the procedure well with no immediate complications      "

## 2022-06-08 ENCOUNTER — OFFICE VISIT (OUTPATIENT)
Dept: PODIATRY | Facility: CLINIC | Age: 62
End: 2022-06-08
Payer: COMMERCIAL

## 2022-06-08 VITALS
HEIGHT: 72 IN | BODY MASS INDEX: 29.57 KG/M2 | HEART RATE: 82 BPM | DIASTOLIC BLOOD PRESSURE: 80 MMHG | SYSTOLIC BLOOD PRESSURE: 148 MMHG

## 2022-06-08 DIAGNOSIS — M20.30 HALLUX MALLEUS, UNSPECIFIED LATERALITY: Primary | ICD-10-CM

## 2022-06-08 DIAGNOSIS — E11.42 POLYNEUROPATHY DUE TO TYPE 2 DIABETES MELLITUS: ICD-10-CM

## 2022-06-08 DIAGNOSIS — L84 PRE-ULCERATIVE CALLUSES: ICD-10-CM

## 2022-06-08 DIAGNOSIS — L97.509 ULCER OF FOOT, UNSPECIFIED LATERALITY, UNSPECIFIED ULCER STAGE: ICD-10-CM

## 2022-06-08 DIAGNOSIS — E11.51 TYPE II DIABETES MELLITUS WITH PERIPHERAL CIRCULATORY DISORDER: ICD-10-CM

## 2022-06-08 PROCEDURE — 1159F PR MEDICATION LIST DOCUMENTED IN MEDICAL RECORD: ICD-10-PCS | Mod: CPTII,S$GLB,, | Performed by: STUDENT IN AN ORGANIZED HEALTH CARE EDUCATION/TRAINING PROGRAM

## 2022-06-08 PROCEDURE — 4010F ACE/ARB THERAPY RXD/TAKEN: CPT | Mod: CPTII,S$GLB,, | Performed by: STUDENT IN AN ORGANIZED HEALTH CARE EDUCATION/TRAINING PROGRAM

## 2022-06-08 PROCEDURE — 99214 PR OFFICE/OUTPT VISIT, EST, LEVL IV, 30-39 MIN: ICD-10-PCS | Mod: 25,S$GLB,, | Performed by: STUDENT IN AN ORGANIZED HEALTH CARE EDUCATION/TRAINING PROGRAM

## 2022-06-08 PROCEDURE — 3079F PR MOST RECENT DIASTOLIC BLOOD PRESSURE 80-89 MM HG: ICD-10-PCS | Mod: CPTII,S$GLB,, | Performed by: STUDENT IN AN ORGANIZED HEALTH CARE EDUCATION/TRAINING PROGRAM

## 2022-06-08 PROCEDURE — 99214 OFFICE O/P EST MOD 30 MIN: CPT | Mod: 25,S$GLB,, | Performed by: STUDENT IN AN ORGANIZED HEALTH CARE EDUCATION/TRAINING PROGRAM

## 2022-06-08 PROCEDURE — 3077F PR MOST RECENT SYSTOLIC BLOOD PRESSURE >= 140 MM HG: ICD-10-PCS | Mod: CPTII,S$GLB,, | Performed by: STUDENT IN AN ORGANIZED HEALTH CARE EDUCATION/TRAINING PROGRAM

## 2022-06-08 PROCEDURE — 99999 PR PBB SHADOW E&M-EST. PATIENT-LVL III: ICD-10-PCS | Mod: PBBFAC,,, | Performed by: STUDENT IN AN ORGANIZED HEALTH CARE EDUCATION/TRAINING PROGRAM

## 2022-06-08 PROCEDURE — 4010F PR ACE/ARB THEARPY RXD/TAKEN: ICD-10-PCS | Mod: CPTII,S$GLB,, | Performed by: STUDENT IN AN ORGANIZED HEALTH CARE EDUCATION/TRAINING PROGRAM

## 2022-06-08 PROCEDURE — 99999 PR PBB SHADOW E&M-EST. PATIENT-LVL III: CPT | Mod: PBBFAC,,, | Performed by: STUDENT IN AN ORGANIZED HEALTH CARE EDUCATION/TRAINING PROGRAM

## 2022-06-08 PROCEDURE — 3044F PR MOST RECENT HEMOGLOBIN A1C LEVEL <7.0%: ICD-10-PCS | Mod: CPTII,S$GLB,, | Performed by: STUDENT IN AN ORGANIZED HEALTH CARE EDUCATION/TRAINING PROGRAM

## 2022-06-08 PROCEDURE — 1159F MED LIST DOCD IN RCRD: CPT | Mod: CPTII,S$GLB,, | Performed by: STUDENT IN AN ORGANIZED HEALTH CARE EDUCATION/TRAINING PROGRAM

## 2022-06-08 PROCEDURE — 3008F BODY MASS INDEX DOCD: CPT | Mod: CPTII,S$GLB,, | Performed by: STUDENT IN AN ORGANIZED HEALTH CARE EDUCATION/TRAINING PROGRAM

## 2022-06-08 PROCEDURE — 3008F PR BODY MASS INDEX (BMI) DOCUMENTED: ICD-10-PCS | Mod: CPTII,S$GLB,, | Performed by: STUDENT IN AN ORGANIZED HEALTH CARE EDUCATION/TRAINING PROGRAM

## 2022-06-08 PROCEDURE — 97597 DBRDMT OPN WND 1ST 20 CM/<: CPT | Mod: S$GLB,,, | Performed by: STUDENT IN AN ORGANIZED HEALTH CARE EDUCATION/TRAINING PROGRAM

## 2022-06-08 PROCEDURE — 3044F HG A1C LEVEL LT 7.0%: CPT | Mod: CPTII,S$GLB,, | Performed by: STUDENT IN AN ORGANIZED HEALTH CARE EDUCATION/TRAINING PROGRAM

## 2022-06-08 PROCEDURE — 3079F DIAST BP 80-89 MM HG: CPT | Mod: CPTII,S$GLB,, | Performed by: STUDENT IN AN ORGANIZED HEALTH CARE EDUCATION/TRAINING PROGRAM

## 2022-06-08 PROCEDURE — 3077F SYST BP >= 140 MM HG: CPT | Mod: CPTII,S$GLB,, | Performed by: STUDENT IN AN ORGANIZED HEALTH CARE EDUCATION/TRAINING PROGRAM

## 2022-06-08 PROCEDURE — 97597 WOUND DEBRIDEMENT: ICD-10-PCS | Mod: S$GLB,,, | Performed by: STUDENT IN AN ORGANIZED HEALTH CARE EDUCATION/TRAINING PROGRAM

## 2022-06-08 NOTE — PROGRESS NOTES
Subjective:      Patient ID: Santiago Lucia is a 61 y.o. male.    Chief Complaint: Foot Problem, Foot Ulcer, Follow-up, and Callouses (Bilateral)    Santiago is a 61 y.o. male who presents to the clinic for evaluation and treatment of high risk feet. Santiago has a past medical history of Avascular necrosis of bone of hip, left (10/30/2018), Coronary artery disease, DM w/o complication type II (8/9/2013), and NSTEMI (non-ST elevated myocardial infarction) (01/2013). The patient's chief complaint is foot ulcer, right foot. This patient has documented high risk feet requiring routine maintenance secondary to peripheral neuropathy. Recent admission for abscess and recent MRI positive for osteomyelitis. Currently treated with 6 week of antibiotics per Infectious Disease recommendation. Relates to mild pain. No other pedal complaints.     2/9/22: Pt seen today for follow up of right foot wound, no new pedal complaints.     2/23/22: Pt seen today for follow up of right foot wound, has callus x2 to left foot as well. No other pedal complaints.     3/9/22: Pt seen today for follow up of right foot and callus x2 to left foot. No other pedal complaints.     4/13/22: Pt seen today for follow up of right foot pre-ulcerative callus. No other pedal complaints.     5/11/22: Pt seen today for RFC and right foot check. No no new pedal complaint. States he has started working again and has been on his feet more.     6/8/22: Pt seen today for right foot check. States has been on his feet more and walking during work. Denies pain or open wounds or signs of infection. Has not obtained shoes yet. No other pedal complaints.     PCP: Yon Asif MD    Date Last Seen by PCP:     Current shoe gear:  Per above    History of Trauma: negative  Sign of Infection: none    Hemoglobin A1C   Date Value Ref Range Status   01/11/2022 6.0 (H) 4.0 - 5.6 % Final     Comment:     ADA Screening Guidelines:  5.7-6.4%  Consistent with  prediabetes  >or=6.5%  Consistent with diabetes    High levels of fetal hemoglobin interfere with the HbA1C  assay. Heterozygous hemoglobin variants (HbS, HgC, etc)do  not significantly interfere with this assay.   However, presence of multiple variants may affect accuracy.     07/30/2021 6.0 (H) 4.0 - 5.6 % Final     Comment:     ADA Screening Guidelines:  5.7-6.4%  Consistent with prediabetes  >or=6.5%  Consistent with diabetes    High levels of fetal hemoglobin interfere with the HbA1C  assay. Heterozygous hemoglobin variants (HbS, HgC, etc)do  not significantly interfere with this assay.   However, presence of multiple variants may affect accuracy.     01/16/2013 7.1 (H) 4.0 - 6.2 % Final       Review of Systems   Constitutional: Negative for chills, decreased appetite, diaphoresis and fever.   HENT: Negative for congestion and hearing loss.    Cardiovascular: Negative for chest pain, claudication, leg swelling and syncope.   Respiratory: Negative for cough and shortness of breath.    Skin: Positive for dry skin, nail changes and poor wound healing. Negative for color change, flushing, itching and rash.   Musculoskeletal: Positive for arthritis, back pain and joint pain. Negative for joint swelling.   Gastrointestinal: Negative for nausea and vomiting.   Neurological: Positive for numbness. Negative for paresthesias.   Psychiatric/Behavioral: Negative for altered mental status. The patient is not nervous/anxious.            Objective:      Physical Exam  Constitutional:       General: He is not in acute distress.     Appearance: Normal appearance. He is well-developed. He is not diaphoretic.   Cardiovascular:      Comments: Dorsalis pedis and posterior tibial pulses are mildly diminished. Skin temperature is within normal limits. Toes are cool to touch and feet are warm proximally. Hair growth is diminished. Skin is atrophic and with mild hyperpigmentation. No edema noted, bilaterally.   Musculoskeletal:          General: No tenderness.      Comments: Adequate joint range of motion without pain, limitation, nor crepitation to foot and ankle joints. Muscle strength is 5/5 in all groups bilaterally.    Hallux malleus to right great toe   Feet:      Right foot:      Skin integrity: Dry skin present. No ulcer, blister, erythema or warmth.      Left foot:      Skin integrity: Dry skin present. No ulcer, blister, erythema or warmth.   Skin:     General: Skin is warm and dry.      Capillary Refill: Capillary refill takes less than 2 seconds.      Comments: Skin is warm and dry, no acute signs of infection noted bilaterally      Toenails are thickened by 2-4 mm's, dystrophic, and are darkened in coloration with subungual fungal debris.     Previous ulcer with diffuse overlying callus to dorsal right hallux, upon debridement, with partial thickness ulcer to level of deep dermis/ superficial subcutaneous layer, measures 1.3x1.0x0.1cm. Site with macerated appearance.     Callus noted to left medial IPJ and MTP of hallux. Callus noted to left lateral 5th digit    Otherwise, no open wounds, macerations or hyperkeratotic lesions, bilaterally            Neurological:      Mental Status: He is alert and oriented to person, place, and time.      Sensory: Sensory deficit present.      Motor: No abnormal muscle tone.      Comments: Light touch within normal limits.    Psychiatric:         Mood and Affect: Mood normal.         Behavior: Behavior normal.         Thought Content: Thought content normal.         Previous Images:      Previous Images:                  Assessment:       Encounter Diagnoses   Name Primary?    Hallux malleus, unspecified laterality Yes    Pre-ulcerative calluses     Polyneuropathy due to type 2 diabetes mellitus     Type II diabetes mellitus with peripheral circulatory disorder     Ulcer of foot, unspecified laterality, unspecified ulcer stage          Plan:       Santiago was seen today for foot problem, foot  ulcer, follow-up and callouses.    Diagnoses and all orders for this visit:    Hallux malleus, unspecified laterality  -     Wound Debridement    Pre-ulcerative calluses  -     Wound Debridement    Polyneuropathy due to type 2 diabetes mellitus  -     Wound Debridement    Type II diabetes mellitus with peripheral circulatory disorder  -     Wound Debridement    Ulcer of foot, unspecified laterality, unspecified ulcer stage  -     Wound Debridement      I counseled the patient on his conditions, their implications and medical management.     -Wound debridement performed, site dressed with gentian violet, hydrafera blue, foam, cast padding and Kerlix in football dressing and secured in tall boot. Pt to wear boot at all times while ambulating. Recommend rest and elevation.     -Previously rx diabetic shoes.     -Continue follow up with Cardiology, previous TCOMS normal    -Shoe inspection. General Foot Education. Patient instructed on proper foot hygeine. We discussed wearing proper shoe gear, daily foot inspections, never walking without protective shoe gear, never putting sharp instruments to feet. Patient reminded of the importance of good nutrition, weight loss if needed to help alleviate foot pressure/pain     -RTC in 1-2 weeks, sooner PRN

## 2022-06-09 NOTE — PROCEDURES
Wound Debridement    Date/Time: 6/8/2022 3:15 PM  Performed by: Jackie Calderon DPM  Authorized by: Jackie Calderon DPM     Consent Done?:  Yes (Verbal)  Local anesthesia used?: No      Wound Details:    Location:  Right foot    Location:  Right 1st Toe    Type of Debridement:  Excisional       Length (cm):  1.3       Area (sq cm):  1.3       Width (cm):  1       Percent Debrided (%):  100       Depth (cm):  0.1       Total Area Debrided (sq cm):  1.3    Depth of debridement:  Epidermis/Dermis    Tissue debrided:  Dermis and Other    Devitalized tissue debrided:  Callus    Instruments:  Blade and Curette    Bleeding:  None  Patient tolerance:  Patient tolerated the procedure well with no immediate complications     No cultures were taken during this visit

## 2022-06-22 ENCOUNTER — OFFICE VISIT (OUTPATIENT)
Dept: PODIATRY | Facility: CLINIC | Age: 62
End: 2022-06-22
Payer: COMMERCIAL

## 2022-06-22 VITALS
BODY MASS INDEX: 29.57 KG/M2 | SYSTOLIC BLOOD PRESSURE: 142 MMHG | HEIGHT: 72 IN | HEART RATE: 73 BPM | DIASTOLIC BLOOD PRESSURE: 83 MMHG

## 2022-06-22 DIAGNOSIS — Z87.2 HEALED ULCER OF FOOT ON EXAMINATION: ICD-10-CM

## 2022-06-22 DIAGNOSIS — E11.42 POLYNEUROPATHY DUE TO TYPE 2 DIABETES MELLITUS: ICD-10-CM

## 2022-06-22 DIAGNOSIS — M20.30 HALLUX MALLEUS, UNSPECIFIED LATERALITY: Primary | ICD-10-CM

## 2022-06-22 DIAGNOSIS — L84 PRE-ULCERATIVE CALLUSES: ICD-10-CM

## 2022-06-22 PROCEDURE — 99999 PR PBB SHADOW E&M-EST. PATIENT-LVL III: ICD-10-PCS | Mod: PBBFAC,,, | Performed by: STUDENT IN AN ORGANIZED HEALTH CARE EDUCATION/TRAINING PROGRAM

## 2022-06-22 PROCEDURE — 99999 PR PBB SHADOW E&M-EST. PATIENT-LVL III: CPT | Mod: PBBFAC,,, | Performed by: STUDENT IN AN ORGANIZED HEALTH CARE EDUCATION/TRAINING PROGRAM

## 2022-06-22 PROCEDURE — 3044F PR MOST RECENT HEMOGLOBIN A1C LEVEL <7.0%: ICD-10-PCS | Mod: CPTII,S$GLB,, | Performed by: STUDENT IN AN ORGANIZED HEALTH CARE EDUCATION/TRAINING PROGRAM

## 2022-06-22 PROCEDURE — 97597 WOUND DEBRIDEMENT: ICD-10-PCS | Mod: S$GLB,,, | Performed by: STUDENT IN AN ORGANIZED HEALTH CARE EDUCATION/TRAINING PROGRAM

## 2022-06-22 PROCEDURE — 3008F BODY MASS INDEX DOCD: CPT | Mod: CPTII,S$GLB,, | Performed by: STUDENT IN AN ORGANIZED HEALTH CARE EDUCATION/TRAINING PROGRAM

## 2022-06-22 PROCEDURE — 4010F PR ACE/ARB THEARPY RXD/TAKEN: ICD-10-PCS | Mod: CPTII,S$GLB,, | Performed by: STUDENT IN AN ORGANIZED HEALTH CARE EDUCATION/TRAINING PROGRAM

## 2022-06-22 PROCEDURE — 3079F PR MOST RECENT DIASTOLIC BLOOD PRESSURE 80-89 MM HG: ICD-10-PCS | Mod: CPTII,S$GLB,, | Performed by: STUDENT IN AN ORGANIZED HEALTH CARE EDUCATION/TRAINING PROGRAM

## 2022-06-22 PROCEDURE — 3044F HG A1C LEVEL LT 7.0%: CPT | Mod: CPTII,S$GLB,, | Performed by: STUDENT IN AN ORGANIZED HEALTH CARE EDUCATION/TRAINING PROGRAM

## 2022-06-22 PROCEDURE — 1159F PR MEDICATION LIST DOCUMENTED IN MEDICAL RECORD: ICD-10-PCS | Mod: CPTII,S$GLB,, | Performed by: STUDENT IN AN ORGANIZED HEALTH CARE EDUCATION/TRAINING PROGRAM

## 2022-06-22 PROCEDURE — 3077F SYST BP >= 140 MM HG: CPT | Mod: CPTII,S$GLB,, | Performed by: STUDENT IN AN ORGANIZED HEALTH CARE EDUCATION/TRAINING PROGRAM

## 2022-06-22 PROCEDURE — 3077F PR MOST RECENT SYSTOLIC BLOOD PRESSURE >= 140 MM HG: ICD-10-PCS | Mod: CPTII,S$GLB,, | Performed by: STUDENT IN AN ORGANIZED HEALTH CARE EDUCATION/TRAINING PROGRAM

## 2022-06-22 PROCEDURE — 99499 UNLISTED E&M SERVICE: CPT | Mod: S$GLB,,, | Performed by: STUDENT IN AN ORGANIZED HEALTH CARE EDUCATION/TRAINING PROGRAM

## 2022-06-22 PROCEDURE — 97597 DBRDMT OPN WND 1ST 20 CM/<: CPT | Mod: S$GLB,,, | Performed by: STUDENT IN AN ORGANIZED HEALTH CARE EDUCATION/TRAINING PROGRAM

## 2022-06-22 PROCEDURE — 4010F ACE/ARB THERAPY RXD/TAKEN: CPT | Mod: CPTII,S$GLB,, | Performed by: STUDENT IN AN ORGANIZED HEALTH CARE EDUCATION/TRAINING PROGRAM

## 2022-06-22 PROCEDURE — 3079F DIAST BP 80-89 MM HG: CPT | Mod: CPTII,S$GLB,, | Performed by: STUDENT IN AN ORGANIZED HEALTH CARE EDUCATION/TRAINING PROGRAM

## 2022-06-22 PROCEDURE — 99499 NO LOS: ICD-10-PCS | Mod: S$GLB,,, | Performed by: STUDENT IN AN ORGANIZED HEALTH CARE EDUCATION/TRAINING PROGRAM

## 2022-06-22 PROCEDURE — 3008F PR BODY MASS INDEX (BMI) DOCUMENTED: ICD-10-PCS | Mod: CPTII,S$GLB,, | Performed by: STUDENT IN AN ORGANIZED HEALTH CARE EDUCATION/TRAINING PROGRAM

## 2022-06-22 PROCEDURE — 1159F MED LIST DOCD IN RCRD: CPT | Mod: CPTII,S$GLB,, | Performed by: STUDENT IN AN ORGANIZED HEALTH CARE EDUCATION/TRAINING PROGRAM

## 2022-06-22 NOTE — PROGRESS NOTES
Subjective:      Patient ID: Santiago Lucia is a 61 y.o. male.    Chief Complaint: Follow-up and Wound Care    Santiago is a 61 y.o. male who presents to the clinic for evaluation and treatment of high risk feet. Santiago has a past medical history of Avascular necrosis of bone of hip, left (10/30/2018), Coronary artery disease, DM w/o complication type II (8/9/2013), and NSTEMI (non-ST elevated myocardial infarction) (01/2013). The patient's chief complaint is foot ulcer, right foot. This patient has documented high risk feet requiring routine maintenance secondary to peripheral neuropathy. Recent admission for abscess and recent MRI positive for osteomyelitis. Currently treated with 6 week of antibiotics per Infectious Disease recommendation. Relates to mild pain. No other pedal complaints.     2/9/22: Pt seen today for follow up of right foot wound, no new pedal complaints.     2/23/22: Pt seen today for follow up of right foot wound, has callus x2 to left foot as well. No other pedal complaints.     3/9/22: Pt seen today for follow up of right foot and callus x2 to left foot. No other pedal complaints.     4/13/22: Pt seen today for follow up of right foot pre-ulcerative callus. No other pedal complaints.     5/11/22: Pt seen today for RFC and right foot check. No no new pedal complaint. States he has started working again and has been on his feet more.     6/8/22: Pt seen today for right foot check.  has been on his feet more and walking during work. Denies pain or open wounds or signs of infection. Has not obtained shoes yet. No other pedal complaints.     6/22/22: Pt seen today for right foot check,  has been dressing it at home with cast padding.  has been in darco shoe. Relates it is too hard to drive in tall boot and he does not like it. No other pedal complaints.     PCP: Yon Asif MD    Date Last Seen by PCP:     Current shoe gear:  Per above    History of Trauma: negative  Sign  of Infection: none    Hemoglobin A1C   Date Value Ref Range Status   01/11/2022 6.0 (H) 4.0 - 5.6 % Final     Comment:     ADA Screening Guidelines:  5.7-6.4%  Consistent with prediabetes  >or=6.5%  Consistent with diabetes    High levels of fetal hemoglobin interfere with the HbA1C  assay. Heterozygous hemoglobin variants (HbS, HgC, etc)do  not significantly interfere with this assay.   However, presence of multiple variants may affect accuracy.     07/30/2021 6.0 (H) 4.0 - 5.6 % Final     Comment:     ADA Screening Guidelines:  5.7-6.4%  Consistent with prediabetes  >or=6.5%  Consistent with diabetes    High levels of fetal hemoglobin interfere with the HbA1C  assay. Heterozygous hemoglobin variants (HbS, HgC, etc)do  not significantly interfere with this assay.   However, presence of multiple variants may affect accuracy.     01/16/2013 7.1 (H) 4.0 - 6.2 % Final       Review of Systems   Constitutional: Negative for chills, decreased appetite, diaphoresis and fever.   HENT: Negative for congestion and hearing loss.    Cardiovascular: Negative for chest pain, claudication, leg swelling and syncope.   Respiratory: Negative for cough and shortness of breath.    Skin: Positive for dry skin, nail changes and poor wound healing. Negative for color change, flushing, itching and rash.   Musculoskeletal: Positive for arthritis, back pain and joint pain. Negative for joint swelling.   Gastrointestinal: Negative for nausea and vomiting.   Neurological: Positive for numbness. Negative for paresthesias.   Psychiatric/Behavioral: Negative for altered mental status. The patient is not nervous/anxious.            Objective:      Physical Exam  Constitutional:       General: He is not in acute distress.     Appearance: Normal appearance. He is well-developed. He is not diaphoretic.   Cardiovascular:      Comments: Dorsalis pedis and posterior tibial pulses are mildly diminished. Skin temperature is within normal limits. Toes are  cool to touch and feet are warm proximally. Hair growth is diminished. Skin is atrophic and with mild hyperpigmentation. No edema noted, bilaterally.   Musculoskeletal:         General: No tenderness.      Comments: Adequate joint range of motion without pain, limitation, nor crepitation to foot and ankle joints. Muscle strength is 5/5 in all groups bilaterally.    Hallux malleus to right great toe   Feet:      Right foot:      Skin integrity: Dry skin present. No ulcer, blister, erythema or warmth.      Left foot:      Skin integrity: Dry skin present. No ulcer, blister, erythema or warmth.   Skin:     General: Skin is warm and dry.      Capillary Refill: Capillary refill takes less than 2 seconds.      Comments: Skin is warm and dry, no acute signs of infection noted bilaterally      Toenails are thickened by 2-4 mm's, dystrophic, and are darkened in coloration with subungual fungal debris.     Pre-ulcerative callus to medial right hallux, upon debridement, site is healed.     Diffuse callus noted to left medial IPJ and MTP of hallux. Diffuse callus noted to left lateral 5th digit    Otherwise, no open wounds, macerations or hyperkeratotic lesions, bilaterally            Neurological:      Mental Status: He is alert and oriented to person, place, and time.      Sensory: Sensory deficit present.      Motor: No abnormal muscle tone.      Comments: Light touch within normal limits.    Psychiatric:         Mood and Affect: Mood normal.         Behavior: Behavior normal.         Thought Content: Thought content normal.         Previous Images:      Previous Images:                  Assessment:       Encounter Diagnoses   Name Primary?    Hallux malleus, unspecified laterality Yes    Pre-ulcerative calluses     Polyneuropathy due to type 2 diabetes mellitus     Healed ulcer of foot on examination          Plan:       Santiago was seen today for follow-up and wound care.    Diagnoses and all orders for this  visit:    Hallux malleus, unspecified laterality  -     Wound Debridement    Pre-ulcerative calluses  -     Wound Debridement    Polyneuropathy due to type 2 diabetes mellitus  -     Wound Debridement    Healed ulcer of foot on examination  -     Wound Debridement      I counseled the patient on his conditions, their implications and medical management.     -Wound debridement performed, ulcer is healed. Dressed with protective bandage. Recommend short boot for now while ambulating to reduce pressure while walking Recommend rest and elevation.     -Previously rx diabetic shoes.     -Continue follow up with Cardiology, previous TCOMS normal    -Shoe inspection. General Foot Education. Patient instructed on proper foot hygeine. We discussed wearing proper shoe gear, daily foot inspections, never walking without protective shoe gear, never putting sharp instruments to feet. Patient reminded of the importance of good nutrition, weight loss if needed to help alleviate foot pressure/pain     -RTC in 2 weeks, sooner PRN

## 2022-06-22 NOTE — PROCEDURES
Wound Debridement    Date/Time: 6/22/2022 1:00 PM  Performed by: Jackie Calderon DPM  Authorized by: Jackie Calderon DPM     Consent Done?:  Yes (Verbal)  Local anesthesia used?: No      Type of Debridement:  Excisional       Length (cm):  1       Area (sq cm):  2       Width (cm):  2       Percent Debrided (%):  100       Depth (cm):  0       Total Area Debrided (sq cm):  2    Depth of debridement:  Epidermis/Dermis    Tissue debrided:  Epidermis    Devitalized tissue debrided:  Callus    Instruments:  Blade    Bleeding:  Minimal  Hemostasis Achieved: Yes    Method Used:  Pressure  Patient tolerance:  Patient tolerated the procedure well with no immediate complications     No cultures were taken during this visit     Site dressed with neosporin and bandage. May change same daily until healed. No open ulcer or blister noted.

## 2022-07-06 ENCOUNTER — OFFICE VISIT (OUTPATIENT)
Dept: PODIATRY | Facility: CLINIC | Age: 62
End: 2022-07-06
Payer: COMMERCIAL

## 2022-07-06 VITALS
DIASTOLIC BLOOD PRESSURE: 85 MMHG | HEART RATE: 96 BPM | HEIGHT: 72 IN | SYSTOLIC BLOOD PRESSURE: 139 MMHG | BODY MASS INDEX: 29.57 KG/M2

## 2022-07-06 DIAGNOSIS — Z87.2 HEALED ULCER OF FOOT ON EXAMINATION: ICD-10-CM

## 2022-07-06 DIAGNOSIS — B35.1 ONYCHOMYCOSIS: ICD-10-CM

## 2022-07-06 DIAGNOSIS — E11.42 POLYNEUROPATHY DUE TO TYPE 2 DIABETES MELLITUS: Primary | ICD-10-CM

## 2022-07-06 DIAGNOSIS — L84 CORN OR CALLUS: ICD-10-CM

## 2022-07-06 DIAGNOSIS — L84 PRE-ULCERATIVE CALLUSES: ICD-10-CM

## 2022-07-06 PROCEDURE — 11056 ROUTINE FOOT CARE: ICD-10-PCS | Mod: S$GLB,,, | Performed by: STUDENT IN AN ORGANIZED HEALTH CARE EDUCATION/TRAINING PROGRAM

## 2022-07-06 PROCEDURE — 4010F PR ACE/ARB THEARPY RXD/TAKEN: ICD-10-PCS | Mod: CPTII,S$GLB,, | Performed by: STUDENT IN AN ORGANIZED HEALTH CARE EDUCATION/TRAINING PROGRAM

## 2022-07-06 PROCEDURE — 11721 ROUTINE FOOT CARE: ICD-10-PCS | Mod: 59,S$GLB,, | Performed by: STUDENT IN AN ORGANIZED HEALTH CARE EDUCATION/TRAINING PROGRAM

## 2022-07-06 PROCEDURE — 3044F HG A1C LEVEL LT 7.0%: CPT | Mod: CPTII,S$GLB,, | Performed by: STUDENT IN AN ORGANIZED HEALTH CARE EDUCATION/TRAINING PROGRAM

## 2022-07-06 PROCEDURE — 99214 OFFICE O/P EST MOD 30 MIN: CPT | Mod: 25,S$GLB,, | Performed by: STUDENT IN AN ORGANIZED HEALTH CARE EDUCATION/TRAINING PROGRAM

## 2022-07-06 PROCEDURE — 3008F PR BODY MASS INDEX (BMI) DOCUMENTED: ICD-10-PCS | Mod: CPTII,S$GLB,, | Performed by: STUDENT IN AN ORGANIZED HEALTH CARE EDUCATION/TRAINING PROGRAM

## 2022-07-06 PROCEDURE — 11721 DEBRIDE NAIL 6 OR MORE: CPT | Mod: 59,S$GLB,, | Performed by: STUDENT IN AN ORGANIZED HEALTH CARE EDUCATION/TRAINING PROGRAM

## 2022-07-06 PROCEDURE — 3079F PR MOST RECENT DIASTOLIC BLOOD PRESSURE 80-89 MM HG: ICD-10-PCS | Mod: CPTII,S$GLB,, | Performed by: STUDENT IN AN ORGANIZED HEALTH CARE EDUCATION/TRAINING PROGRAM

## 2022-07-06 PROCEDURE — 3075F SYST BP GE 130 - 139MM HG: CPT | Mod: CPTII,S$GLB,, | Performed by: STUDENT IN AN ORGANIZED HEALTH CARE EDUCATION/TRAINING PROGRAM

## 2022-07-06 PROCEDURE — 3079F DIAST BP 80-89 MM HG: CPT | Mod: CPTII,S$GLB,, | Performed by: STUDENT IN AN ORGANIZED HEALTH CARE EDUCATION/TRAINING PROGRAM

## 2022-07-06 PROCEDURE — 11056 PARNG/CUTG B9 HYPRKR LES 2-4: CPT | Mod: S$GLB,,, | Performed by: STUDENT IN AN ORGANIZED HEALTH CARE EDUCATION/TRAINING PROGRAM

## 2022-07-06 PROCEDURE — 1159F MED LIST DOCD IN RCRD: CPT | Mod: CPTII,S$GLB,, | Performed by: STUDENT IN AN ORGANIZED HEALTH CARE EDUCATION/TRAINING PROGRAM

## 2022-07-06 PROCEDURE — 99214 PR OFFICE/OUTPT VISIT, EST, LEVL IV, 30-39 MIN: ICD-10-PCS | Mod: 25,S$GLB,, | Performed by: STUDENT IN AN ORGANIZED HEALTH CARE EDUCATION/TRAINING PROGRAM

## 2022-07-06 PROCEDURE — 3044F PR MOST RECENT HEMOGLOBIN A1C LEVEL <7.0%: ICD-10-PCS | Mod: CPTII,S$GLB,, | Performed by: STUDENT IN AN ORGANIZED HEALTH CARE EDUCATION/TRAINING PROGRAM

## 2022-07-06 PROCEDURE — 3075F PR MOST RECENT SYSTOLIC BLOOD PRESS GE 130-139MM HG: ICD-10-PCS | Mod: CPTII,S$GLB,, | Performed by: STUDENT IN AN ORGANIZED HEALTH CARE EDUCATION/TRAINING PROGRAM

## 2022-07-06 PROCEDURE — 99999 PR PBB SHADOW E&M-EST. PATIENT-LVL III: CPT | Mod: PBBFAC,,, | Performed by: STUDENT IN AN ORGANIZED HEALTH CARE EDUCATION/TRAINING PROGRAM

## 2022-07-06 PROCEDURE — 1159F PR MEDICATION LIST DOCUMENTED IN MEDICAL RECORD: ICD-10-PCS | Mod: CPTII,S$GLB,, | Performed by: STUDENT IN AN ORGANIZED HEALTH CARE EDUCATION/TRAINING PROGRAM

## 2022-07-06 PROCEDURE — 3008F BODY MASS INDEX DOCD: CPT | Mod: CPTII,S$GLB,, | Performed by: STUDENT IN AN ORGANIZED HEALTH CARE EDUCATION/TRAINING PROGRAM

## 2022-07-06 PROCEDURE — 99999 PR PBB SHADOW E&M-EST. PATIENT-LVL III: ICD-10-PCS | Mod: PBBFAC,,, | Performed by: STUDENT IN AN ORGANIZED HEALTH CARE EDUCATION/TRAINING PROGRAM

## 2022-07-06 PROCEDURE — 4010F ACE/ARB THERAPY RXD/TAKEN: CPT | Mod: CPTII,S$GLB,, | Performed by: STUDENT IN AN ORGANIZED HEALTH CARE EDUCATION/TRAINING PROGRAM

## 2022-07-06 NOTE — PROGRESS NOTES
Subjective:      Patient ID: Santiago Lucia is a 61 y.o. male.    Chief Complaint: Foot Ulcer (Bilateral great toe), Follow-up, and Wound Care    Santiago is a 61 y.o. male who presents to the clinic for evaluation and treatment of high risk feet. Santiago has a past medical history of Avascular necrosis of bone of hip, left (10/30/2018), Coronary artery disease, DM w/o complication type II (8/9/2013), and NSTEMI (non-ST elevated myocardial infarction) (01/2013). The patient's chief complaint is foot ulcer, right foot. This patient has documented high risk feet requiring routine maintenance secondary to peripheral neuropathy. Recent admission for abscess and recent MRI positive for osteomyelitis. Currently treated with 6 week of antibiotics per Infectious Disease recommendation. Relates to mild pain. No other pedal complaints.     2/9/22: Pt seen today for follow up of right foot wound, no new pedal complaints.     2/23/22: Pt seen today for follow up of right foot wound, has callus x2 to left foot as well. No other pedal complaints.     3/9/22: Pt seen today for follow up of right foot and callus x2 to left foot. No other pedal complaints.     4/13/22: Pt seen today for follow up of right foot pre-ulcerative callus. No other pedal complaints.     5/11/22: Pt seen today for RFC and right foot check. No no new pedal complaint. States he has started working again and has been on his feet more.     6/8/22: Pt seen today for right foot check.  has been on his feet more and walking during work. Denies pain or open wounds or signs of infection. Has not obtained shoes yet. No other pedal complaints.     6/22/22: Pt seen today for right foot check,  has been dressing it at home with cast padding.  has been in darco shoe. Relates it is too hard to drive in tall boot and he does not like it. No other pedal complaints.     7/6/22: Pt seen today for RFC and pre-ulcerative callus to right great toe. No new pedal  complaints. States he is wearing the short boot without issues and using urea cream on callus. States he is working on finding a job where is sitting down to avoid ulcer to his foot.     PCP: Yon Asif MD    Date Last Seen by PCP: Dr. Velasquez 4/14/22    Current shoe gear:  Per above    History of Trauma: negative  Sign of Infection: none    Hemoglobin A1C   Date Value Ref Range Status   01/11/2022 6.0 (H) 4.0 - 5.6 % Final     Comment:     ADA Screening Guidelines:  5.7-6.4%  Consistent with prediabetes  >or=6.5%  Consistent with diabetes    High levels of fetal hemoglobin interfere with the HbA1C  assay. Heterozygous hemoglobin variants (HbS, HgC, etc)do  not significantly interfere with this assay.   However, presence of multiple variants may affect accuracy.     07/30/2021 6.0 (H) 4.0 - 5.6 % Final     Comment:     ADA Screening Guidelines:  5.7-6.4%  Consistent with prediabetes  >or=6.5%  Consistent with diabetes    High levels of fetal hemoglobin interfere with the HbA1C  assay. Heterozygous hemoglobin variants (HbS, HgC, etc)do  not significantly interfere with this assay.   However, presence of multiple variants may affect accuracy.     01/16/2013 7.1 (H) 4.0 - 6.2 % Final       Review of Systems   Constitutional: Negative for chills, decreased appetite, diaphoresis and fever.   HENT: Negative for congestion and hearing loss.    Cardiovascular: Negative for chest pain, claudication, leg swelling and syncope.   Respiratory: Negative for cough and shortness of breath.    Skin: Positive for dry skin, nail changes and poor wound healing. Negative for color change, flushing, itching and rash.   Musculoskeletal: Positive for arthritis, back pain and joint pain. Negative for joint swelling.   Gastrointestinal: Negative for nausea and vomiting.   Neurological: Positive for numbness. Negative for paresthesias.   Psychiatric/Behavioral: Negative for altered mental status. The patient is not nervous/anxious.             Objective:      Physical Exam  Constitutional:       General: He is not in acute distress.     Appearance: Normal appearance. He is well-developed. He is not diaphoretic.   Cardiovascular:      Comments: Dorsalis pedis and posterior tibial pulses are mildly diminished. Skin temperature is within normal limits. Toes are cool to touch and feet are warm proximally. Hair growth is diminished. Skin is atrophic and with mild hyperpigmentation. No edema noted, bilaterally.   Musculoskeletal:         General: No tenderness.      Comments: Adequate joint range of motion without pain, limitation, nor crepitation to foot and ankle joints. Muscle strength is 5/5 in all groups bilaterally.    Hallux malleus to right great toe   Feet:      Right foot:      Skin integrity: Dry skin present. No ulcer, blister, erythema or warmth.      Left foot:      Skin integrity: Dry skin present. No ulcer, blister, erythema or warmth.   Skin:     General: Skin is warm and dry.      Capillary Refill: Capillary refill takes less than 2 seconds.      Comments: Skin is warm and dry, no acute signs of infection noted bilaterally      Toenails are thickened by 2-4 mm's, dystrophic, and are darkened in coloration with subungual fungal debris.     Pre-ulcerative callus to medial right hallux, upon debridement, site is healed.     Diffuse callus noted to left medial IPJ and MTP of hallux. Diffuse callus noted to left lateral 5th digit    Otherwise, no open wounds, macerations or hyperkeratotic lesions, bilaterally            Neurological:      Mental Status: He is alert and oriented to person, place, and time.      Sensory: Sensory deficit present.      Motor: No abnormal muscle tone.      Comments: Light touch within normal limits.    Psychiatric:         Mood and Affect: Mood normal.         Behavior: Behavior normal.         Thought Content: Thought content normal.         Previous Images:      Previous Images:                  Assessment:        Encounter Diagnoses   Name Primary?    Polyneuropathy due to type 2 diabetes mellitus Yes    Healed ulcer of foot on examination          Plan:       Santiago was seen today for foot ulcer, follow-up and wound care.    Diagnoses and all orders for this visit:    Polyneuropathy due to type 2 diabetes mellitus  -     Routine Foot Care    Healed ulcer of foot on examination  -     Routine Foot Care      I counseled the patient on his conditions, their implications and medical management.     -RFC per attached note.     -Wound debridement performed, ulcer is healed. Dressed with protective bandage. Recommend short boot for now while ambulating to reduce pressure while walking. Recommend rest and elevation. Continue urea cream 3-4 x per day.     -Previously rx diabetic shoes.     -Continue follow up with Cardiology, previous TCOMS normal    -Shoe inspection. General Foot Education. Patient instructed on proper foot hygeine. We discussed wearing proper shoe gear, daily foot inspections, never walking without protective shoe gear, never putting sharp instruments to feet. Patient reminded of the importance of good nutrition, weight loss if needed to help alleviate foot pressure/pain     -RTC in 2-4 weeks, sooner PRN

## 2022-07-20 ENCOUNTER — OFFICE VISIT (OUTPATIENT)
Dept: PODIATRY | Facility: CLINIC | Age: 62
End: 2022-07-20
Payer: COMMERCIAL

## 2022-07-20 VITALS
BODY MASS INDEX: 29.57 KG/M2 | DIASTOLIC BLOOD PRESSURE: 82 MMHG | HEART RATE: 84 BPM | HEIGHT: 72 IN | SYSTOLIC BLOOD PRESSURE: 145 MMHG

## 2022-07-20 DIAGNOSIS — Z87.2 HEALED ULCER OF FOOT ON EXAMINATION: ICD-10-CM

## 2022-07-20 DIAGNOSIS — E11.42 POLYNEUROPATHY DUE TO TYPE 2 DIABETES MELLITUS: ICD-10-CM

## 2022-07-20 DIAGNOSIS — L84 PRE-ULCERATIVE CALLUSES: Primary | ICD-10-CM

## 2022-07-20 PROCEDURE — 97597 DBRDMT OPN WND 1ST 20 CM/<: CPT | Mod: S$GLB,,, | Performed by: STUDENT IN AN ORGANIZED HEALTH CARE EDUCATION/TRAINING PROGRAM

## 2022-07-20 PROCEDURE — 99999 PR PBB SHADOW E&M-EST. PATIENT-LVL III: CPT | Mod: PBBFAC,,, | Performed by: STUDENT IN AN ORGANIZED HEALTH CARE EDUCATION/TRAINING PROGRAM

## 2022-07-20 PROCEDURE — 1159F MED LIST DOCD IN RCRD: CPT | Mod: CPTII,S$GLB,, | Performed by: STUDENT IN AN ORGANIZED HEALTH CARE EDUCATION/TRAINING PROGRAM

## 2022-07-20 PROCEDURE — 99499 NO LOS: ICD-10-PCS | Mod: S$GLB,,, | Performed by: STUDENT IN AN ORGANIZED HEALTH CARE EDUCATION/TRAINING PROGRAM

## 2022-07-20 PROCEDURE — 3044F PR MOST RECENT HEMOGLOBIN A1C LEVEL <7.0%: ICD-10-PCS | Mod: CPTII,S$GLB,, | Performed by: STUDENT IN AN ORGANIZED HEALTH CARE EDUCATION/TRAINING PROGRAM

## 2022-07-20 PROCEDURE — 99499 UNLISTED E&M SERVICE: CPT | Mod: S$GLB,,, | Performed by: STUDENT IN AN ORGANIZED HEALTH CARE EDUCATION/TRAINING PROGRAM

## 2022-07-20 PROCEDURE — 3079F DIAST BP 80-89 MM HG: CPT | Mod: CPTII,S$GLB,, | Performed by: STUDENT IN AN ORGANIZED HEALTH CARE EDUCATION/TRAINING PROGRAM

## 2022-07-20 PROCEDURE — 3079F PR MOST RECENT DIASTOLIC BLOOD PRESSURE 80-89 MM HG: ICD-10-PCS | Mod: CPTII,S$GLB,, | Performed by: STUDENT IN AN ORGANIZED HEALTH CARE EDUCATION/TRAINING PROGRAM

## 2022-07-20 PROCEDURE — 3008F BODY MASS INDEX DOCD: CPT | Mod: CPTII,S$GLB,, | Performed by: STUDENT IN AN ORGANIZED HEALTH CARE EDUCATION/TRAINING PROGRAM

## 2022-07-20 PROCEDURE — 3077F SYST BP >= 140 MM HG: CPT | Mod: CPTII,S$GLB,, | Performed by: STUDENT IN AN ORGANIZED HEALTH CARE EDUCATION/TRAINING PROGRAM

## 2022-07-20 PROCEDURE — 99999 PR PBB SHADOW E&M-EST. PATIENT-LVL III: ICD-10-PCS | Mod: PBBFAC,,, | Performed by: STUDENT IN AN ORGANIZED HEALTH CARE EDUCATION/TRAINING PROGRAM

## 2022-07-20 PROCEDURE — 4010F PR ACE/ARB THEARPY RXD/TAKEN: ICD-10-PCS | Mod: CPTII,S$GLB,, | Performed by: STUDENT IN AN ORGANIZED HEALTH CARE EDUCATION/TRAINING PROGRAM

## 2022-07-20 PROCEDURE — 3008F PR BODY MASS INDEX (BMI) DOCUMENTED: ICD-10-PCS | Mod: CPTII,S$GLB,, | Performed by: STUDENT IN AN ORGANIZED HEALTH CARE EDUCATION/TRAINING PROGRAM

## 2022-07-20 PROCEDURE — 1159F PR MEDICATION LIST DOCUMENTED IN MEDICAL RECORD: ICD-10-PCS | Mod: CPTII,S$GLB,, | Performed by: STUDENT IN AN ORGANIZED HEALTH CARE EDUCATION/TRAINING PROGRAM

## 2022-07-20 PROCEDURE — 97597 WOUND DEBRIDEMENT: ICD-10-PCS | Mod: S$GLB,,, | Performed by: STUDENT IN AN ORGANIZED HEALTH CARE EDUCATION/TRAINING PROGRAM

## 2022-07-20 PROCEDURE — 4010F ACE/ARB THERAPY RXD/TAKEN: CPT | Mod: CPTII,S$GLB,, | Performed by: STUDENT IN AN ORGANIZED HEALTH CARE EDUCATION/TRAINING PROGRAM

## 2022-07-20 PROCEDURE — 3077F PR MOST RECENT SYSTOLIC BLOOD PRESSURE >= 140 MM HG: ICD-10-PCS | Mod: CPTII,S$GLB,, | Performed by: STUDENT IN AN ORGANIZED HEALTH CARE EDUCATION/TRAINING PROGRAM

## 2022-07-20 PROCEDURE — 3044F HG A1C LEVEL LT 7.0%: CPT | Mod: CPTII,S$GLB,, | Performed by: STUDENT IN AN ORGANIZED HEALTH CARE EDUCATION/TRAINING PROGRAM

## 2022-07-20 NOTE — PROCEDURES
Wound Debridement    Date/Time: 7/20/2022 1:00 PM  Performed by: Jackie Calderon DPM  Authorized by: Jackie Calderon DPM     Consent Done?:  Yes (Verbal)  Local anesthesia used?: No      Wound Details:    Location:  Right foot    Location:  Right 1st Toe    Type of Debridement:  Excisional       Length (cm):  1       Area (sq cm):  1       Width (cm):  1       Percent Debrided (%):  100       Depth (cm):  0       Total Area Debrided (sq cm):  1    Depth of debridement:  Epidermis/Dermis    Tissue debrided:  Epidermis    Devitalized tissue debrided:  Callus    Instruments:  Blade and Curette    Bleeding:  Minimal  Patient tolerance:  Patient tolerated the procedure well with no immediate complications     No cultures were taken during this visit . Pre-ulcerative callus noted, no underlying ulcer after debridement. Site is healed. Site dressed protective with triple antibiotic ointment and bandage

## 2022-07-20 NOTE — PROGRESS NOTES
Subjective:      Patient ID: Santiago Lucia is a 61 y.o. male.    Chief Complaint: Diabetes Mellitus, Peripheral Neuropathy, Foot Ulcer, and Follow-up    Santiago is a 61 y.o. male who presents to the clinic for evaluation and treatment of high risk feet. Santiago has a past medical history of Avascular necrosis of bone of hip, left (10/30/2018), Coronary artery disease, DM w/o complication type II (8/9/2013), and NSTEMI (non-ST elevated myocardial infarction) (01/2013). The patient's chief complaint is foot ulcer, right foot. This patient has documented high risk feet requiring routine maintenance secondary to peripheral neuropathy. Recent admission for abscess and recent MRI positive for osteomyelitis. Currently treated with 6 week of antibiotics per Infectious Disease recommendation. Relates to mild pain. No other pedal complaints.     2/9/22: Pt seen today for follow up of right foot wound, no new pedal complaints.     2/23/22: Pt seen today for follow up of right foot wound, has callus x2 to left foot as well. No other pedal complaints.     3/9/22: Pt seen today for follow up of right foot and callus x2 to left foot. No other pedal complaints.     4/13/22: Pt seen today for follow up of right foot pre-ulcerative callus. No other pedal complaints.     5/11/22: Pt seen today for RFC and right foot check. No no new pedal complaint. States he has started working again and has been on his feet more.     6/8/22: Pt seen today for right foot check.  has been on his feet more and walking during work. Denies pain or open wounds or signs of infection. Has not obtained shoes yet. No other pedal complaints.     6/22/22: Pt seen today for right foot check,  has been dressing it at home with cast padding.  has been in darco shoe. Relates it is too hard to drive in tall boot and he does not like it. No other pedal complaints.     7/6/22: Pt seen today for RFC and pre-ulcerative callus to right great toe. No  new pedal complaints. States he is wearing the short boot without issues and using urea cream on callus. States he is working on finding a job where is sitting down to avoid ulcer to his foot.     7/20/22: Seen today for pre-ulcerative callus debridement, states has been filing callus at home. No new pedal complaints    PCP: Yon Asif MD    Date Last Seen by PCP: Dr. Velasquez 4/14/22    Current shoe gear:  Per above    History of Trauma: negative  Sign of Infection: none    Hemoglobin A1C   Date Value Ref Range Status   01/11/2022 6.0 (H) 4.0 - 5.6 % Final     Comment:     ADA Screening Guidelines:  5.7-6.4%  Consistent with prediabetes  >or=6.5%  Consistent with diabetes    High levels of fetal hemoglobin interfere with the HbA1C  assay. Heterozygous hemoglobin variants (HbS, HgC, etc)do  not significantly interfere with this assay.   However, presence of multiple variants may affect accuracy.     07/30/2021 6.0 (H) 4.0 - 5.6 % Final     Comment:     ADA Screening Guidelines:  5.7-6.4%  Consistent with prediabetes  >or=6.5%  Consistent with diabetes    High levels of fetal hemoglobin interfere with the HbA1C  assay. Heterozygous hemoglobin variants (HbS, HgC, etc)do  not significantly interfere with this assay.   However, presence of multiple variants may affect accuracy.     01/16/2013 7.1 (H) 4.0 - 6.2 % Final       Review of Systems   Constitutional: Negative for chills, decreased appetite, diaphoresis and fever.   HENT: Negative for congestion and hearing loss.    Cardiovascular: Negative for chest pain, claudication, leg swelling and syncope.   Respiratory: Negative for cough and shortness of breath.    Skin: Positive for dry skin, nail changes and poor wound healing. Negative for color change, flushing, itching and rash.   Musculoskeletal: Positive for arthritis, back pain and joint pain. Negative for joint swelling.   Gastrointestinal: Negative for nausea and vomiting.   Neurological: Positive for  numbness. Negative for paresthesias.   Psychiatric/Behavioral: Negative for altered mental status. The patient is not nervous/anxious.            Objective:      Physical Exam  Constitutional:       General: He is not in acute distress.     Appearance: Normal appearance. He is well-developed. He is not diaphoretic.   Cardiovascular:      Comments: Dorsalis pedis and posterior tibial pulses are mildly diminished. Skin temperature is within normal limits. Toes are cool to touch and feet are warm proximally. Hair growth is diminished. Skin is atrophic and with mild hyperpigmentation. No edema noted, bilaterally.   Musculoskeletal:         General: No tenderness.      Comments: Adequate joint range of motion without pain, limitation, nor crepitation to foot and ankle joints. Muscle strength is 5/5 in all groups bilaterally.    Hallux malleus to right great toe   Feet:      Right foot:      Skin integrity: Dry skin present. No ulcer, blister, erythema or warmth.      Left foot:      Skin integrity: Dry skin present. No ulcer, blister, erythema or warmth.   Skin:     General: Skin is warm and dry.      Capillary Refill: Capillary refill takes less than 2 seconds.      Comments: Skin is warm and dry, no acute signs of infection noted bilaterally      Toenails are thickened by 2-4 mm's, dystrophic, and are darkened in coloration with subungual fungal debris.     Pre-ulcerative callus to medial right hallux, upon debridement, site is healed.     Diffuse callus noted to left medial IPJ and MTP of hallux. Diffuse callus noted to left lateral 5th digit    Otherwise, no open wounds, macerations or hyperkeratotic lesions, bilaterally            Neurological:      Mental Status: He is alert and oriented to person, place, and time.      Sensory: Sensory deficit present.      Motor: No abnormal muscle tone.      Comments: Light touch within normal limits.    Psychiatric:         Mood and Affect: Mood normal.         Behavior:  Behavior normal.         Thought Content: Thought content normal.       Right great toe ulcer is healed.     Previous Images:      Previous Images:                  Assessment:       Encounter Diagnoses   Name Primary?    Pre-ulcerative calluses Yes    Healed ulcer of foot on examination     Polyneuropathy due to type 2 diabetes mellitus          Plan:       Santiago was seen today for diabetes mellitus, peripheral neuropathy, foot ulcer and follow-up.    Diagnoses and all orders for this visit:    Pre-ulcerative calluses  -     Wound Debridement    Healed ulcer of foot on examination    Polyneuropathy due to type 2 diabetes mellitus      I counseled the patient on his conditions, their implications and medical management.     -Wound debridement performed, ulcer is healed. Dressed with protective bandage. Recommend short boot for now while ambulating to reduce pressure while walking. Recommend rest and elevation. Continue urea cream 3-4 x per day.     -Previously rx diabetic shoes.     -Continue follow up with Cardiology, previous TCOMS normal    -Shoe inspection. General Foot Education. Patient instructed on proper foot hygeine. We discussed wearing proper shoe gear, daily foot inspections, never walking without protective shoe gear, never putting sharp instruments to feet. Patient reminded of the importance of good nutrition, weight loss if needed to help alleviate foot pressure/pain     -RTC in 2-4 weeks, sooner PRN

## 2022-08-03 ENCOUNTER — OFFICE VISIT (OUTPATIENT)
Dept: PODIATRY | Facility: CLINIC | Age: 62
End: 2022-08-03
Payer: COMMERCIAL

## 2022-08-03 VITALS
HEART RATE: 70 BPM | DIASTOLIC BLOOD PRESSURE: 85 MMHG | BODY MASS INDEX: 29.57 KG/M2 | SYSTOLIC BLOOD PRESSURE: 145 MMHG | HEIGHT: 72 IN

## 2022-08-03 DIAGNOSIS — M20.30 HALLUX MALLEUS, UNSPECIFIED LATERALITY: ICD-10-CM

## 2022-08-03 DIAGNOSIS — E11.42 POLYNEUROPATHY DUE TO TYPE 2 DIABETES MELLITUS: ICD-10-CM

## 2022-08-03 DIAGNOSIS — L84 PRE-ULCERATIVE CALLUSES: Primary | ICD-10-CM

## 2022-08-03 PROCEDURE — 4010F ACE/ARB THERAPY RXD/TAKEN: CPT | Mod: CPTII,S$GLB,, | Performed by: STUDENT IN AN ORGANIZED HEALTH CARE EDUCATION/TRAINING PROGRAM

## 2022-08-03 PROCEDURE — 3044F PR MOST RECENT HEMOGLOBIN A1C LEVEL <7.0%: ICD-10-PCS | Mod: CPTII,S$GLB,, | Performed by: STUDENT IN AN ORGANIZED HEALTH CARE EDUCATION/TRAINING PROGRAM

## 2022-08-03 PROCEDURE — 97597 DBRDMT OPN WND 1ST 20 CM/<: CPT | Mod: S$GLB,,, | Performed by: STUDENT IN AN ORGANIZED HEALTH CARE EDUCATION/TRAINING PROGRAM

## 2022-08-03 PROCEDURE — 99213 OFFICE O/P EST LOW 20 MIN: CPT | Mod: 25,S$GLB,, | Performed by: STUDENT IN AN ORGANIZED HEALTH CARE EDUCATION/TRAINING PROGRAM

## 2022-08-03 PROCEDURE — 97597 WOUND DEBRIDEMENT: ICD-10-PCS | Mod: S$GLB,,, | Performed by: STUDENT IN AN ORGANIZED HEALTH CARE EDUCATION/TRAINING PROGRAM

## 2022-08-03 PROCEDURE — 4010F PR ACE/ARB THEARPY RXD/TAKEN: ICD-10-PCS | Mod: CPTII,S$GLB,, | Performed by: STUDENT IN AN ORGANIZED HEALTH CARE EDUCATION/TRAINING PROGRAM

## 2022-08-03 PROCEDURE — 99999 PR PBB SHADOW E&M-EST. PATIENT-LVL III: ICD-10-PCS | Mod: PBBFAC,,, | Performed by: STUDENT IN AN ORGANIZED HEALTH CARE EDUCATION/TRAINING PROGRAM

## 2022-08-03 PROCEDURE — 3077F SYST BP >= 140 MM HG: CPT | Mod: CPTII,S$GLB,, | Performed by: STUDENT IN AN ORGANIZED HEALTH CARE EDUCATION/TRAINING PROGRAM

## 2022-08-03 PROCEDURE — 3077F PR MOST RECENT SYSTOLIC BLOOD PRESSURE >= 140 MM HG: ICD-10-PCS | Mod: CPTII,S$GLB,, | Performed by: STUDENT IN AN ORGANIZED HEALTH CARE EDUCATION/TRAINING PROGRAM

## 2022-08-03 PROCEDURE — 3044F HG A1C LEVEL LT 7.0%: CPT | Mod: CPTII,S$GLB,, | Performed by: STUDENT IN AN ORGANIZED HEALTH CARE EDUCATION/TRAINING PROGRAM

## 2022-08-03 PROCEDURE — 99999 PR PBB SHADOW E&M-EST. PATIENT-LVL III: CPT | Mod: PBBFAC,,, | Performed by: STUDENT IN AN ORGANIZED HEALTH CARE EDUCATION/TRAINING PROGRAM

## 2022-08-03 PROCEDURE — 1159F MED LIST DOCD IN RCRD: CPT | Mod: CPTII,S$GLB,, | Performed by: STUDENT IN AN ORGANIZED HEALTH CARE EDUCATION/TRAINING PROGRAM

## 2022-08-03 PROCEDURE — 3079F DIAST BP 80-89 MM HG: CPT | Mod: CPTII,S$GLB,, | Performed by: STUDENT IN AN ORGANIZED HEALTH CARE EDUCATION/TRAINING PROGRAM

## 2022-08-03 PROCEDURE — 3079F PR MOST RECENT DIASTOLIC BLOOD PRESSURE 80-89 MM HG: ICD-10-PCS | Mod: CPTII,S$GLB,, | Performed by: STUDENT IN AN ORGANIZED HEALTH CARE EDUCATION/TRAINING PROGRAM

## 2022-08-03 PROCEDURE — 3008F PR BODY MASS INDEX (BMI) DOCUMENTED: ICD-10-PCS | Mod: CPTII,S$GLB,, | Performed by: STUDENT IN AN ORGANIZED HEALTH CARE EDUCATION/TRAINING PROGRAM

## 2022-08-03 PROCEDURE — 99213 PR OFFICE/OUTPT VISIT, EST, LEVL III, 20-29 MIN: ICD-10-PCS | Mod: 25,S$GLB,, | Performed by: STUDENT IN AN ORGANIZED HEALTH CARE EDUCATION/TRAINING PROGRAM

## 2022-08-03 PROCEDURE — 3008F BODY MASS INDEX DOCD: CPT | Mod: CPTII,S$GLB,, | Performed by: STUDENT IN AN ORGANIZED HEALTH CARE EDUCATION/TRAINING PROGRAM

## 2022-08-03 PROCEDURE — 1159F PR MEDICATION LIST DOCUMENTED IN MEDICAL RECORD: ICD-10-PCS | Mod: CPTII,S$GLB,, | Performed by: STUDENT IN AN ORGANIZED HEALTH CARE EDUCATION/TRAINING PROGRAM

## 2022-08-03 NOTE — PROCEDURES
Wound Debridement    Date/Time: 8/3/2022 1:00 PM  Performed by: Jackie Calderon DPM  Authorized by: Jackie Calderon DPM     Consent Done?:  Yes (Verbal)  Local anesthesia used?: No      Wound Details:    Location:  Left foot    Location:  Left 1st Toe    Type of Debridement:  Excisional       Length (cm):  0.8       Area (sq cm):  0.64       Width (cm):  0.8       Percent Debrided (%):  100       Depth (cm):  0       Total Area Debrided (sq cm):  0.64    Depth of debridement:  Epidermis/Dermis    Tissue debrided:  Epidermis    Devitalized tissue debrided:  Callus    Instruments:  Blade    Bleeding:  Minimal  Patient tolerance:  Patient tolerated the procedure well with no immediate complications     No cultures were taken during this visit

## 2022-08-03 NOTE — PROGRESS NOTES
Subjective:      Patient ID: Santiago Lucia is a 61 y.o. male.    Chief Complaint: Foot Ulcer, Follow-up, and Diabetes Mellitus    Santiago is a 61 y.o. male who presents to the clinic for evaluation and treatment of high risk feet. Santiago has a past medical history of Avascular necrosis of bone of hip, left (10/30/2018), Coronary artery disease, DM w/o complication type II (8/9/2013), and NSTEMI (non-ST elevated myocardial infarction) (01/2013). The patient's chief complaint is foot ulcer, right foot. This patient has documented high risk feet requiring routine maintenance secondary to peripheral neuropathy. Recent admission for abscess and recent MRI positive for osteomyelitis. Currently treated with 6 week of antibiotics per Infectious Disease recommendation. Relates to mild pain. No other pedal complaints.     2/9/22: Pt seen today for follow up of right foot wound, no new pedal complaints.     2/23/22: Pt seen today for follow up of right foot wound, has callus x2 to left foot as well. No other pedal complaints.     3/9/22: Pt seen today for follow up of right foot and callus x2 to left foot. No other pedal complaints.     4/13/22: Pt seen today for follow up of right foot pre-ulcerative callus. No other pedal complaints.     5/11/22: Pt seen today for RFC and right foot check. No no new pedal complaint. States he has started working again and has been on his feet more.     6/8/22: Pt seen today for right foot check.  has been on his feet more and walking during work. Denies pain or open wounds or signs of infection. Has not obtained shoes yet. No other pedal complaints.     6/22/22: Pt seen today for right foot check,  has been dressing it at home with cast padding.  has been in darco shoe. Relates it is too hard to drive in tall boot and he does not like it. No other pedal complaints.     7/6/22: Pt seen today for RFC and pre-ulcerative callus to right great toe. No new pedal complaints.  States he is wearing the short boot without issues and using urea cream on callus. States he is working on finding a job where is sitting down to avoid ulcer to his foot.     7/20/22: Seen today for pre-ulcerative callus debridement,  has been filing callus at home. No new pedal complaints    8/3/22: Pt seen today for pre-ulcerative callus debridement. No new pedal complaints.  has not obtained prescription shoes yet, inquring about over the counter recommendations     PCP: Yon Asif MD    Date Last Seen by PCP: Dr. Velasquez 4/14/22    Current shoe gear:  Per above    History of Trauma: negative  Sign of Infection: none    Hemoglobin A1C   Date Value Ref Range Status   01/11/2022 6.0 (H) 4.0 - 5.6 % Final     Comment:     ADA Screening Guidelines:  5.7-6.4%  Consistent with prediabetes  >or=6.5%  Consistent with diabetes    High levels of fetal hemoglobin interfere with the HbA1C  assay. Heterozygous hemoglobin variants (HbS, HgC, etc)do  not significantly interfere with this assay.   However, presence of multiple variants may affect accuracy.     07/30/2021 6.0 (H) 4.0 - 5.6 % Final     Comment:     ADA Screening Guidelines:  5.7-6.4%  Consistent with prediabetes  >or=6.5%  Consistent with diabetes    High levels of fetal hemoglobin interfere with the HbA1C  assay. Heterozygous hemoglobin variants (HbS, HgC, etc)do  not significantly interfere with this assay.   However, presence of multiple variants may affect accuracy.     01/16/2013 7.1 (H) 4.0 - 6.2 % Final       Review of Systems   Constitutional: Negative for chills, decreased appetite, diaphoresis and fever.   HENT: Negative for congestion and hearing loss.    Cardiovascular: Negative for chest pain, claudication, leg swelling and syncope.   Respiratory: Negative for cough and shortness of breath.    Skin: Positive for dry skin, nail changes and poor wound healing. Negative for color change, flushing, itching and rash.   Musculoskeletal:  Positive for arthritis, back pain and joint pain. Negative for joint swelling.   Gastrointestinal: Negative for nausea and vomiting.   Neurological: Positive for numbness. Negative for paresthesias.   Psychiatric/Behavioral: Negative for altered mental status. The patient is not nervous/anxious.            Objective:      Physical Exam  Constitutional:       General: He is not in acute distress.     Appearance: Normal appearance. He is well-developed. He is not diaphoretic.   Cardiovascular:      Comments: Dorsalis pedis and posterior tibial pulses are mildly diminished. Skin temperature is within normal limits. Toes are cool to touch and feet are warm proximally. Hair growth is diminished. Skin is atrophic and with mild hyperpigmentation. No edema noted, bilaterally.   Musculoskeletal:         General: No tenderness.      Comments: Adequate joint range of motion without pain, limitation, nor crepitation to foot and ankle joints. Muscle strength is 5/5 in all groups bilaterally.    Hallux malleus to right great toe   Feet:      Right foot:      Skin integrity: Dry skin present. No ulcer, blister, erythema or warmth.      Left foot:      Skin integrity: Dry skin present. No ulcer, blister, erythema or warmth.   Skin:     General: Skin is warm and dry.      Capillary Refill: Capillary refill takes less than 2 seconds.      Comments: Skin is warm and dry, no acute signs of infection noted bilaterally      Toenails are thickened by 2-4 mm's, dystrophic, and are darkened in coloration with subungual fungal debris.     Pre-ulcerative callus to medial right hallux, upon debridement, site is healed.     Diffuse callus noted to left medial IPJ and MTP of hallux. Diffuse callus noted to left lateral 5th digit    Otherwise, no open wounds, macerations or hyperkeratotic lesions, bilaterally            Neurological:      Mental Status: He is alert and oriented to person, place, and time.      Sensory: Sensory deficit present.       Motor: No abnormal muscle tone.      Comments: Light touch within normal limits.    Psychiatric:         Mood and Affect: Mood normal.         Behavior: Behavior normal.         Thought Content: Thought content normal.       Right great toe ulcer is healed.     Previous Images:      Previous Images:                  Assessment:       Encounter Diagnoses   Name Primary?    Pre-ulcerative calluses Yes    Polyneuropathy due to type 2 diabetes mellitus     Hallux malleus, unspecified laterality          Plan:       Santiago was seen today for foot ulcer, follow-up and diabetes mellitus.    Diagnoses and all orders for this visit:    Pre-ulcerative calluses    Polyneuropathy due to type 2 diabetes mellitus    Hallux malleus, unspecified laterality      I counseled the patient on his conditions, their implications and medical management.     -Wound debridement performed, ulcer is healed. Dressed with protective bandage. Recommend short boot for now while ambulating to reduce pressure while walking. Recommend rest and elevation. Continue urea cream 3-4 x per day. Shoe list dispensed.     -Previously rx diabetic shoes.     -Continue follow up with Cardiology, previous TCOMS normal    -Shoe inspection. General Foot Education. Patient instructed on proper foot hygeine. We discussed wearing proper shoe gear, daily foot inspections, never walking without protective shoe gear, never putting sharp instruments to feet. Patient reminded of the importance of good nutrition, weight loss if needed to help alleviate foot pressure/pain     -RTC in 2-4 weeks, sooner PRN

## 2022-08-03 NOTE — PATIENT INSTRUCTIONS
Miguel one one bondii shoes  Over the counter diabetic shoes, consider new balance or SAS shoes with velcro straps

## 2022-08-05 ENCOUNTER — TELEPHONE (OUTPATIENT)
Dept: PODIATRY | Facility: CLINIC | Age: 62
End: 2022-08-05
Payer: COMMERCIAL

## 2022-08-17 ENCOUNTER — OFFICE VISIT (OUTPATIENT)
Dept: URGENT CARE | Facility: CLINIC | Age: 62
End: 2022-08-17
Payer: COMMERCIAL

## 2022-08-17 ENCOUNTER — HOSPITAL ENCOUNTER (INPATIENT)
Facility: HOSPITAL | Age: 62
LOS: 1 days | Discharge: HOME | DRG: 872 | End: 2022-08-19
Attending: EMERGENCY MEDICINE | Admitting: INTERNAL MEDICINE
Payer: COMMERCIAL

## 2022-08-17 VITALS
OXYGEN SATURATION: 94 % | RESPIRATION RATE: 20 BRPM | SYSTOLIC BLOOD PRESSURE: 132 MMHG | HEART RATE: 135 BPM | HEIGHT: 75 IN | DIASTOLIC BLOOD PRESSURE: 71 MMHG | BODY MASS INDEX: 27.35 KG/M2 | TEMPERATURE: 103 F | WEIGHT: 220 LBS

## 2022-08-17 DIAGNOSIS — N17.9 ACUTE KIDNEY INJURY: ICD-10-CM

## 2022-08-17 DIAGNOSIS — N45.3 EPIDIDYMOORCHITIS: Primary | ICD-10-CM

## 2022-08-17 DIAGNOSIS — R50.9 FEVER, UNSPECIFIED FEVER CAUSE: ICD-10-CM

## 2022-08-17 DIAGNOSIS — E87.29 HIGH ANION GAP METABOLIC ACIDOSIS: ICD-10-CM

## 2022-08-17 DIAGNOSIS — E78.5 DYSLIPIDEMIA: ICD-10-CM

## 2022-08-17 DIAGNOSIS — R50.9 FEVER: ICD-10-CM

## 2022-08-17 DIAGNOSIS — N50.819 TESTICULAR PAIN: ICD-10-CM

## 2022-08-17 DIAGNOSIS — R30.0 DYSURIA: ICD-10-CM

## 2022-08-17 DIAGNOSIS — D72.829 LEUKOCYTOSIS, UNSPECIFIED TYPE: ICD-10-CM

## 2022-08-17 DIAGNOSIS — A41.9 SEPSIS, DUE TO UNSPECIFIED ORGANISM, UNSPECIFIED WHETHER ACUTE ORGAN DYSFUNCTION PRESENT: ICD-10-CM

## 2022-08-17 DIAGNOSIS — I25.10 CORONARY ARTERY DISEASE INVOLVING NATIVE CORONARY ARTERY OF NATIVE HEART WITHOUT ANGINA PECTORIS: ICD-10-CM

## 2022-08-17 DIAGNOSIS — N45.2 ORCHITIS: Primary | ICD-10-CM

## 2022-08-17 LAB
BACTERIA #/AREA URNS HPF: ABNORMAL /HPF
BILIRUB UR QL STRIP: NEGATIVE
BILIRUB UR QL STRIP: NEGATIVE
CLARITY UR: ABNORMAL
COLOR UR: YELLOW
GLUCOSE UR QL STRIP: NEGATIVE
GLUCOSE UR QL STRIP: NEGATIVE
HGB UR QL STRIP: ABNORMAL
HYALINE CASTS #/AREA URNS LPF: 0 /LPF
KETONES UR QL STRIP: NEGATIVE
KETONES UR QL STRIP: NEGATIVE
LEUKOCYTE ESTERASE UR QL STRIP: ABNORMAL
LEUKOCYTE ESTERASE UR QL STRIP: POSITIVE
MICROSCOPIC COMMENT: ABNORMAL
NITRITE UR QL STRIP: NEGATIVE
PH UR STRIP: 6 [PH] (ref 5–8)
PH, POC UA: 5 (ref 5–8)
POC BLOOD, URINE: POSITIVE
POC NITRATES, URINE: NEGATIVE
PROT UR QL STRIP: ABNORMAL
PROT UR QL STRIP: NEGATIVE
RBC #/AREA URNS HPF: 5 /HPF (ref 0–4)
SP GR UR STRIP: 1.01 (ref 1–1.03)
SP GR UR STRIP: 1.02 (ref 1–1.03)
URN SPEC COLLECT METH UR: ABNORMAL
UROBILINOGEN UR STRIP-ACNC: NEGATIVE EU/DL
UROBILINOGEN UR STRIP-ACNC: NORMAL (ref 0.3–2.2)
WBC #/AREA URNS HPF: >100 /HPF (ref 0–5)

## 2022-08-17 PROCEDURE — 3078F PR MOST RECENT DIASTOLIC BLOOD PRESSURE < 80 MM HG: ICD-10-PCS | Mod: CPTII,S$GLB,, | Performed by: NURSE PRACTITIONER

## 2022-08-17 PROCEDURE — 87077 CULTURE AEROBIC IDENTIFY: CPT | Performed by: PHYSICIAN ASSISTANT

## 2022-08-17 PROCEDURE — 87491 CHLMYD TRACH DNA AMP PROBE: CPT | Performed by: PHYSICIAN ASSISTANT

## 2022-08-17 PROCEDURE — 87186 SC STD MICRODIL/AGAR DIL: CPT | Performed by: PHYSICIAN ASSISTANT

## 2022-08-17 PROCEDURE — 87088 URINE BACTERIA CULTURE: CPT | Performed by: PHYSICIAN ASSISTANT

## 2022-08-17 PROCEDURE — 93010 ELECTROCARDIOGRAM REPORT: CPT | Mod: ,,, | Performed by: INTERNAL MEDICINE

## 2022-08-17 PROCEDURE — 3075F SYST BP GE 130 - 139MM HG: CPT | Mod: CPTII,S$GLB,, | Performed by: NURSE PRACTITIONER

## 2022-08-17 PROCEDURE — 81003 URINALYSIS AUTO W/O SCOPE: CPT | Mod: QW,S$GLB,, | Performed by: NURSE PRACTITIONER

## 2022-08-17 PROCEDURE — 93005 ELECTROCARDIOGRAM TRACING: CPT

## 2022-08-17 PROCEDURE — 80053 COMPREHEN METABOLIC PANEL: CPT | Performed by: PHYSICIAN ASSISTANT

## 2022-08-17 PROCEDURE — 83605 ASSAY OF LACTIC ACID: CPT | Performed by: EMERGENCY MEDICINE

## 2022-08-17 PROCEDURE — 3078F DIAST BP <80 MM HG: CPT | Mod: CPTII,S$GLB,, | Performed by: NURSE PRACTITIONER

## 2022-08-17 PROCEDURE — 1160F RVW MEDS BY RX/DR IN RCRD: CPT | Mod: CPTII,S$GLB,, | Performed by: NURSE PRACTITIONER

## 2022-08-17 PROCEDURE — 3044F PR MOST RECENT HEMOGLOBIN A1C LEVEL <7.0%: ICD-10-PCS | Mod: CPTII,S$GLB,, | Performed by: NURSE PRACTITIONER

## 2022-08-17 PROCEDURE — 4010F PR ACE/ARB THEARPY RXD/TAKEN: ICD-10-PCS | Mod: CPTII,S$GLB,, | Performed by: NURSE PRACTITIONER

## 2022-08-17 PROCEDURE — 84145 PROCALCITONIN (PCT): CPT | Performed by: EMERGENCY MEDICINE

## 2022-08-17 PROCEDURE — 1159F MED LIST DOCD IN RCRD: CPT | Mod: CPTII,S$GLB,, | Performed by: NURSE PRACTITIONER

## 2022-08-17 PROCEDURE — 93010 EKG 12-LEAD: ICD-10-PCS | Mod: ,,, | Performed by: INTERNAL MEDICINE

## 2022-08-17 PROCEDURE — 99291 CRITICAL CARE FIRST HOUR: CPT

## 2022-08-17 PROCEDURE — 85060 BLOOD SMEAR INTERPRETATION: CPT | Mod: ,,, | Performed by: PATHOLOGY

## 2022-08-17 PROCEDURE — 1160F PR REVIEW ALL MEDS BY PRESCRIBER/CLIN PHARMACIST DOCUMENTED: ICD-10-PCS | Mod: CPTII,S$GLB,, | Performed by: NURSE PRACTITIONER

## 2022-08-17 PROCEDURE — 87040 BLOOD CULTURE FOR BACTERIA: CPT | Performed by: EMERGENCY MEDICINE

## 2022-08-17 PROCEDURE — 3044F HG A1C LEVEL LT 7.0%: CPT | Mod: CPTII,S$GLB,, | Performed by: NURSE PRACTITIONER

## 2022-08-17 PROCEDURE — 81000 URINALYSIS NONAUTO W/SCOPE: CPT | Performed by: PHYSICIAN ASSISTANT

## 2022-08-17 PROCEDURE — 81003 POCT URINALYSIS, DIPSTICK, AUTOMATED, W/O SCOPE: ICD-10-PCS | Mod: QW,S$GLB,, | Performed by: NURSE PRACTITIONER

## 2022-08-17 PROCEDURE — 99215 OFFICE O/P EST HI 40 MIN: CPT | Mod: S$GLB,,, | Performed by: NURSE PRACTITIONER

## 2022-08-17 PROCEDURE — 85060 PATHOLOGIST REVIEW: ICD-10-PCS | Mod: ,,, | Performed by: PATHOLOGY

## 2022-08-17 PROCEDURE — 3008F PR BODY MASS INDEX (BMI) DOCUMENTED: ICD-10-PCS | Mod: CPTII,S$GLB,, | Performed by: NURSE PRACTITIONER

## 2022-08-17 PROCEDURE — 99215 PR OFFICE/OUTPT VISIT, EST, LEVL V, 40-54 MIN: ICD-10-PCS | Mod: S$GLB,,, | Performed by: NURSE PRACTITIONER

## 2022-08-17 PROCEDURE — 4010F ACE/ARB THERAPY RXD/TAKEN: CPT | Mod: CPTII,S$GLB,, | Performed by: NURSE PRACTITIONER

## 2022-08-17 PROCEDURE — 87591 N.GONORRHOEAE DNA AMP PROB: CPT | Performed by: PHYSICIAN ASSISTANT

## 2022-08-17 PROCEDURE — 1159F PR MEDICATION LIST DOCUMENTED IN MEDICAL RECORD: ICD-10-PCS | Mod: CPTII,S$GLB,, | Performed by: NURSE PRACTITIONER

## 2022-08-17 PROCEDURE — 3075F PR MOST RECENT SYSTOLIC BLOOD PRESS GE 130-139MM HG: ICD-10-PCS | Mod: CPTII,S$GLB,, | Performed by: NURSE PRACTITIONER

## 2022-08-17 PROCEDURE — 3008F BODY MASS INDEX DOCD: CPT | Mod: CPTII,S$GLB,, | Performed by: NURSE PRACTITIONER

## 2022-08-17 PROCEDURE — 87086 URINE CULTURE/COLONY COUNT: CPT | Performed by: PHYSICIAN ASSISTANT

## 2022-08-17 RX ORDER — NAPROXEN 500 MG/1
500 TABLET ORAL
Status: DISCONTINUED | OUTPATIENT
Start: 2022-08-17 | End: 2022-08-17

## 2022-08-17 RX ORDER — IBUPROFEN 200 MG
800 TABLET ORAL
Status: COMPLETED | OUTPATIENT
Start: 2022-08-17 | End: 2022-08-17

## 2022-08-17 RX ORDER — ACETAMINOPHEN 325 MG/1
650 TABLET ORAL
Status: COMPLETED | OUTPATIENT
Start: 2022-08-17 | End: 2022-08-18

## 2022-08-17 RX ORDER — ACETAMINOPHEN 500 MG
1000 TABLET ORAL
Status: DISCONTINUED | OUTPATIENT
Start: 2022-08-17 | End: 2022-08-17

## 2022-08-17 RX ADMIN — Medication 800 MG: at 07:08

## 2022-08-17 NOTE — LETTER
August 19, 2022         [address] [place] [address] [place] [address]  Phone: [phone]  Fax: [phone]       Patient: Santiago Lucia   YOB: 1960  Date of Visit: 08/19/2022    To Whom It May Concern:    Vane Lucia  was at [facility] on 08/19/2022. The patient may return to work after evaluation with Urology on 9/1. He has movement restrictions and is unable to stay seated for extended periods of time/unable to perform physically demanding duties  Until that time.   If you have any questions or concerns, or if I can be of further assistance, please do not hesitate to contact me.    Sincerely,    Sommer Flores MD

## 2022-08-18 PROBLEM — D72.829 LEUKOCYTOSIS: Status: ACTIVE | Noted: 2022-08-18

## 2022-08-18 PROBLEM — N39.0 UTI (URINARY TRACT INFECTION): Status: ACTIVE | Noted: 2022-08-18

## 2022-08-18 PROBLEM — N17.9 ACUTE KIDNEY INJURY: Status: ACTIVE | Noted: 2022-08-18

## 2022-08-18 PROBLEM — N45.3 EPIDIDYMOORCHITIS: Status: ACTIVE | Noted: 2022-08-18

## 2022-08-18 PROBLEM — E87.29 HIGH ANION GAP METABOLIC ACIDOSIS: Status: ACTIVE | Noted: 2022-08-18

## 2022-08-18 LAB
ALBUMIN SERPL BCP-MCNC: 3.2 G/DL (ref 3.5–5.2)
ALBUMIN SERPL BCP-MCNC: 3.6 G/DL (ref 3.5–5.2)
ALP SERPL-CCNC: 104 U/L (ref 55–135)
ALP SERPL-CCNC: 89 U/L (ref 55–135)
ALT SERPL W/O P-5'-P-CCNC: 17 U/L (ref 10–44)
ALT SERPL W/O P-5'-P-CCNC: 20 U/L (ref 10–44)
ANION GAP SERPL CALC-SCNC: 13 MMOL/L (ref 8–16)
ANION GAP SERPL CALC-SCNC: 18 MMOL/L (ref 8–16)
ANISOCYTOSIS BLD QL SMEAR: SLIGHT
AST SERPL-CCNC: 13 U/L (ref 10–40)
AST SERPL-CCNC: 18 U/L (ref 10–40)
BASOPHILS # BLD AUTO: 0.11 K/UL (ref 0–0.2)
BASOPHILS # BLD AUTO: ABNORMAL K/UL
BASOPHILS NFR BLD: 0 % (ref 0–1.9)
BASOPHILS NFR BLD: 0.3 % (ref 0–1.9)
BILIRUB SERPL-MCNC: 0.5 MG/DL (ref 0.1–1)
BILIRUB SERPL-MCNC: 0.7 MG/DL (ref 0.1–1)
BUN SERPL-MCNC: 22 MG/DL (ref 8–23)
BUN SERPL-MCNC: 26 MG/DL (ref 8–23)
CALCIUM SERPL-MCNC: 8.9 MG/DL (ref 8.7–10.5)
CALCIUM SERPL-MCNC: 9.2 MG/DL (ref 8.7–10.5)
CHLORIDE SERPL-SCNC: 101 MMOL/L (ref 95–110)
CHLORIDE SERPL-SCNC: 102 MMOL/L (ref 95–110)
CO2 SERPL-SCNC: 20 MMOL/L (ref 23–29)
CO2 SERPL-SCNC: 25 MMOL/L (ref 23–29)
CREAT SERPL-MCNC: 1.2 MG/DL (ref 0.5–1.4)
CREAT SERPL-MCNC: 1.3 MG/DL (ref 0.5–1.4)
CTP QC/QA: YES
DIFFERENTIAL METHOD: ABNORMAL
DIFFERENTIAL METHOD: ABNORMAL
EOSINOPHIL # BLD AUTO: 0.1 K/UL (ref 0–0.5)
EOSINOPHIL NFR BLD: 0 % (ref 0–8)
EOSINOPHIL NFR BLD: 0.2 % (ref 0–8)
ERYTHROCYTE [DISTWIDTH] IN BLOOD BY AUTOMATED COUNT: 14.2 % (ref 11.5–14.5)
ERYTHROCYTE [DISTWIDTH] IN BLOOD BY AUTOMATED COUNT: 14.4 % (ref 11.5–14.5)
EST. GFR  (NO RACE VARIABLE): >60 ML/MIN/1.73 M^2
EST. GFR  (NO RACE VARIABLE): >60 ML/MIN/1.73 M^2
ESTIMATED AVG GLUCOSE: 114 MG/DL (ref 68–131)
GLUCOSE SERPL-MCNC: 120 MG/DL (ref 70–110)
GLUCOSE SERPL-MCNC: 122 MG/DL (ref 70–110)
HBA1C MFR BLD: 5.6 % (ref 4–5.6)
HCT VFR BLD AUTO: 38.4 % (ref 40–54)
HCT VFR BLD AUTO: 42.4 % (ref 40–54)
HGB BLD-MCNC: 12.3 G/DL (ref 14–18)
HGB BLD-MCNC: 13.7 G/DL (ref 14–18)
IMM GRANULOCYTES # BLD AUTO: 0.37 K/UL (ref 0–0.04)
IMM GRANULOCYTES # BLD AUTO: ABNORMAL K/UL
IMM GRANULOCYTES NFR BLD AUTO: 1 % (ref 0–0.5)
IMM GRANULOCYTES NFR BLD AUTO: ABNORMAL %
LACTATE SERPL-SCNC: 1 MMOL/L (ref 0.5–2.2)
LACTATE SERPL-SCNC: 1.7 MMOL/L (ref 0.5–2.2)
LYMPHOCYTES # BLD AUTO: 2.6 K/UL (ref 1–4.8)
LYMPHOCYTES NFR BLD: 5 % (ref 18–48)
LYMPHOCYTES NFR BLD: 7.1 % (ref 18–48)
MCH RBC QN AUTO: 28.4 PG (ref 27–31)
MCH RBC QN AUTO: 28.5 PG (ref 27–31)
MCHC RBC AUTO-ENTMCNC: 32 G/DL (ref 32–36)
MCHC RBC AUTO-ENTMCNC: 32.3 G/DL (ref 32–36)
MCV RBC AUTO: 88 FL (ref 82–98)
MCV RBC AUTO: 89 FL (ref 82–98)
MONOCYTES # BLD AUTO: 3.4 K/UL (ref 0.3–1)
MONOCYTES NFR BLD: 2 % (ref 4–15)
MONOCYTES NFR BLD: 9.3 % (ref 4–15)
NEUTROPHILS # BLD AUTO: 29.7 K/UL (ref 1.8–7.7)
NEUTROPHILS # BLD AUTO: ABNORMAL K/UL
NEUTROPHILS NFR BLD: 77 % (ref 38–73)
NEUTROPHILS NFR BLD: 82.1 % (ref 38–73)
NEUTS BAND NFR BLD MANUAL: 15 %
NRBC BLD-RTO: 0 /100 WBC
NRBC BLD-RTO: 0 /100 WBC
PATH REV BLD -IMP: NORMAL
PLATELET # BLD AUTO: 195 K/UL (ref 150–450)
PLATELET # BLD AUTO: 224 K/UL (ref 150–450)
PLATELET BLD QL SMEAR: ABNORMAL
PMV BLD AUTO: 9.7 FL (ref 9.2–12.9)
PMV BLD AUTO: 9.8 FL (ref 9.2–12.9)
POTASSIUM SERPL-SCNC: 4.2 MMOL/L (ref 3.5–5.1)
POTASSIUM SERPL-SCNC: 4.4 MMOL/L (ref 3.5–5.1)
PROCALCITONIN SERPL IA-MCNC: 0.76 NG/ML
PROT SERPL-MCNC: 7 G/DL (ref 6–8.4)
PROT SERPL-MCNC: 7.3 G/DL (ref 6–8.4)
RBC # BLD AUTO: 4.31 M/UL (ref 4.6–6.2)
RBC # BLD AUTO: 4.83 M/UL (ref 4.6–6.2)
SARS-COV-2 RDRP RESP QL NAA+PROBE: NEGATIVE
SODIUM SERPL-SCNC: 139 MMOL/L (ref 136–145)
SODIUM SERPL-SCNC: 140 MMOL/L (ref 136–145)
WBC # BLD AUTO: 36.22 K/UL (ref 3.9–12.7)
WBC # BLD AUTO: 39.44 K/UL (ref 3.9–12.7)
WBC OTHER NFR BLD MANUAL: 1 %

## 2022-08-18 PROCEDURE — 63600175 PHARM REV CODE 636 W HCPCS: Performed by: EMERGENCY MEDICINE

## 2022-08-18 PROCEDURE — 25000003 PHARM REV CODE 250: Performed by: STUDENT IN AN ORGANIZED HEALTH CARE EDUCATION/TRAINING PROGRAM

## 2022-08-18 PROCEDURE — 99222 1ST HOSP IP/OBS MODERATE 55: CPT | Mod: ,,, | Performed by: UROLOGY

## 2022-08-18 PROCEDURE — 83036 HEMOGLOBIN GLYCOSYLATED A1C: CPT | Performed by: STUDENT IN AN ORGANIZED HEALTH CARE EDUCATION/TRAINING PROGRAM

## 2022-08-18 PROCEDURE — 94761 N-INVAS EAR/PLS OXIMETRY MLT: CPT

## 2022-08-18 PROCEDURE — 36415 COLL VENOUS BLD VENIPUNCTURE: CPT | Performed by: STUDENT IN AN ORGANIZED HEALTH CARE EDUCATION/TRAINING PROGRAM

## 2022-08-18 PROCEDURE — U0002 COVID-19 LAB TEST NON-CDC: HCPCS | Performed by: EMERGENCY MEDICINE

## 2022-08-18 PROCEDURE — 83605 ASSAY OF LACTIC ACID: CPT | Performed by: EMERGENCY MEDICINE

## 2022-08-18 PROCEDURE — 80053 COMPREHEN METABOLIC PANEL: CPT | Performed by: STUDENT IN AN ORGANIZED HEALTH CARE EDUCATION/TRAINING PROGRAM

## 2022-08-18 PROCEDURE — 11000001 HC ACUTE MED/SURG PRIVATE ROOM

## 2022-08-18 PROCEDURE — 85025 COMPLETE CBC W/AUTO DIFF WBC: CPT | Performed by: STUDENT IN AN ORGANIZED HEALTH CARE EDUCATION/TRAINING PROGRAM

## 2022-08-18 PROCEDURE — 87040 BLOOD CULTURE FOR BACTERIA: CPT | Mod: 59 | Performed by: STUDENT IN AN ORGANIZED HEALTH CARE EDUCATION/TRAINING PROGRAM

## 2022-08-18 PROCEDURE — 63600175 PHARM REV CODE 636 W HCPCS: Performed by: STUDENT IN AN ORGANIZED HEALTH CARE EDUCATION/TRAINING PROGRAM

## 2022-08-18 PROCEDURE — 99222 PR INITIAL HOSPITAL CARE,LEVL II: ICD-10-PCS | Mod: ,,, | Performed by: UROLOGY

## 2022-08-18 PROCEDURE — 25000003 PHARM REV CODE 250: Performed by: EMERGENCY MEDICINE

## 2022-08-18 RX ORDER — CHOLECALCIFEROL (VITAMIN D3) 25 MCG
1000 TABLET ORAL DAILY
Status: DISCONTINUED | OUTPATIENT
Start: 2022-08-18 | End: 2022-08-19 | Stop reason: HOSPADM

## 2022-08-18 RX ORDER — METOPROLOL SUCCINATE 25 MG/1
25 TABLET, EXTENDED RELEASE ORAL DAILY
Status: DISCONTINUED | OUTPATIENT
Start: 2022-08-18 | End: 2022-08-18

## 2022-08-18 RX ORDER — HYDROCODONE BITARTRATE AND ACETAMINOPHEN 5; 325 MG/1; MG/1
1 TABLET ORAL EVERY 6 HOURS PRN
Status: DISCONTINUED | OUTPATIENT
Start: 2022-08-18 | End: 2022-08-19 | Stop reason: HOSPADM

## 2022-08-18 RX ORDER — TAMSULOSIN HYDROCHLORIDE 0.4 MG/1
0.4 CAPSULE ORAL DAILY
Status: DISCONTINUED | OUTPATIENT
Start: 2022-08-18 | End: 2022-08-19 | Stop reason: HOSPADM

## 2022-08-18 RX ORDER — ASCORBIC ACID 500 MG
500 TABLET ORAL DAILY
Status: DISCONTINUED | OUTPATIENT
Start: 2022-08-18 | End: 2022-08-19 | Stop reason: HOSPADM

## 2022-08-18 RX ORDER — HYDROCODONE BITARTRATE AND ACETAMINOPHEN 7.5; 325 MG/1; MG/1
1 TABLET ORAL EVERY 12 HOURS PRN
Status: DISCONTINUED | OUTPATIENT
Start: 2022-08-18 | End: 2022-08-18

## 2022-08-18 RX ORDER — IBUPROFEN 200 MG
24 TABLET ORAL
Status: DISCONTINUED | OUTPATIENT
Start: 2022-08-18 | End: 2022-08-19 | Stop reason: HOSPADM

## 2022-08-18 RX ORDER — ZINC SULFATE 50(220)MG
220 CAPSULE ORAL DAILY
Status: DISCONTINUED | OUTPATIENT
Start: 2022-08-18 | End: 2022-08-19 | Stop reason: HOSPADM

## 2022-08-18 RX ORDER — SODIUM CHLORIDE 0.9 % (FLUSH) 0.9 %
10 SYRINGE (ML) INJECTION EVERY 12 HOURS PRN
Status: DISCONTINUED | OUTPATIENT
Start: 2022-08-18 | End: 2022-08-19 | Stop reason: HOSPADM

## 2022-08-18 RX ORDER — GLUCAGON 1 MG
1 KIT INJECTION
Status: DISCONTINUED | OUTPATIENT
Start: 2022-08-18 | End: 2022-08-19 | Stop reason: HOSPADM

## 2022-08-18 RX ORDER — ENOXAPARIN SODIUM 100 MG/ML
40 INJECTION SUBCUTANEOUS EVERY 24 HOURS
Status: DISCONTINUED | OUTPATIENT
Start: 2022-08-18 | End: 2022-08-19 | Stop reason: HOSPADM

## 2022-08-18 RX ORDER — IBUPROFEN 400 MG/1
400 TABLET ORAL EVERY 6 HOURS PRN
Status: DISCONTINUED | OUTPATIENT
Start: 2022-08-18 | End: 2022-08-19 | Stop reason: HOSPADM

## 2022-08-18 RX ORDER — NALOXONE HCL 0.4 MG/ML
0.02 VIAL (ML) INJECTION
Status: DISCONTINUED | OUTPATIENT
Start: 2022-08-18 | End: 2022-08-19 | Stop reason: HOSPADM

## 2022-08-18 RX ORDER — LANOLIN ALCOHOL/MO/W.PET/CERES
400 CREAM (GRAM) TOPICAL DAILY
Status: DISCONTINUED | OUTPATIENT
Start: 2022-08-18 | End: 2022-08-19 | Stop reason: HOSPADM

## 2022-08-18 RX ORDER — ATORVASTATIN CALCIUM 40 MG/1
40 TABLET, FILM COATED ORAL DAILY
Refills: 3 | Status: DISCONTINUED | OUTPATIENT
Start: 2022-08-18 | End: 2022-08-19 | Stop reason: HOSPADM

## 2022-08-18 RX ORDER — ASPIRIN 81 MG/1
81 TABLET ORAL DAILY
Status: DISCONTINUED | OUTPATIENT
Start: 2022-08-18 | End: 2022-08-19 | Stop reason: HOSPADM

## 2022-08-18 RX ORDER — LOSARTAN POTASSIUM 25 MG/1
25 TABLET ORAL DAILY
Status: DISCONTINUED | OUTPATIENT
Start: 2022-08-18 | End: 2022-08-18

## 2022-08-18 RX ORDER — DOXYCYCLINE HYCLATE 100 MG
100 TABLET ORAL EVERY 12 HOURS
Status: DISCONTINUED | OUTPATIENT
Start: 2022-08-18 | End: 2022-08-18

## 2022-08-18 RX ORDER — IBUPROFEN 200 MG
16 TABLET ORAL
Status: DISCONTINUED | OUTPATIENT
Start: 2022-08-18 | End: 2022-08-19 | Stop reason: HOSPADM

## 2022-08-18 RX ADMIN — CHOLECALCIFEROL TAB 25 MCG (1000 UNIT) 1000 UNITS: 25 TAB at 09:08

## 2022-08-18 RX ADMIN — HYDROCODONE BITARTRATE AND ACETAMINOPHEN 1 TABLET: 5; 325 TABLET ORAL at 02:08

## 2022-08-18 RX ADMIN — Medication 400 MG: at 09:08

## 2022-08-18 RX ADMIN — OXYCODONE HYDROCHLORIDE AND ACETAMINOPHEN 500 MG: 500 TABLET ORAL at 09:08

## 2022-08-18 RX ADMIN — SODIUM CHLORIDE, SODIUM LACTATE, POTASSIUM CHLORIDE, AND CALCIUM CHLORIDE 1000 ML: .6; .31; .03; .02 INJECTION, SOLUTION INTRAVENOUS at 04:08

## 2022-08-18 RX ADMIN — SODIUM CHLORIDE 1000 ML: 0.9 INJECTION, SOLUTION INTRAVENOUS at 12:08

## 2022-08-18 RX ADMIN — SODIUM CHLORIDE, SODIUM LACTATE, POTASSIUM CHLORIDE, AND CALCIUM CHLORIDE 1000 ML: .6; .31; .03; .02 INJECTION, SOLUTION INTRAVENOUS at 03:08

## 2022-08-18 RX ADMIN — IBUPROFEN 400 MG: 400 TABLET ORAL at 04:08

## 2022-08-18 RX ADMIN — ZINC SULFATE 220 MG (50 MG) CAPSULE 220 MG: CAPSULE at 09:08

## 2022-08-18 RX ADMIN — ACETAMINOPHEN 650 MG: 325 TABLET ORAL at 12:08

## 2022-08-18 RX ADMIN — CEFTRIAXONE 1 G: 1 INJECTION, SOLUTION INTRAVENOUS at 09:08

## 2022-08-18 RX ADMIN — TAMSULOSIN HYDROCHLORIDE 0.4 MG: 0.4 CAPSULE ORAL at 09:08

## 2022-08-18 RX ADMIN — ATORVASTATIN CALCIUM 40 MG: 40 TABLET, FILM COATED ORAL at 09:08

## 2022-08-18 RX ADMIN — CEFTRIAXONE 2 G: 2 INJECTION, SOLUTION INTRAVENOUS at 12:08

## 2022-08-18 RX ADMIN — ENOXAPARIN SODIUM 40 MG: 100 INJECTION SUBCUTANEOUS at 04:08

## 2022-08-18 RX ADMIN — ASPIRIN 81 MG: 81 TABLET, COATED ORAL at 09:08

## 2022-08-18 NOTE — ED PROVIDER NOTES
Encounter Date: 2022       History     Chief Complaint   Patient presents with    Male  Problem     States last week right testicle started to become enlarged. Has increased with tenderness over the past 3 days. Fever that started last week. Denies dysuria. Was seen at , hospitals. Temp was 103. Tylenol given.      61-year-old male presents emergency department complaining of right testicular pain, testicular swelling, fever, frequency.  Onset over a week ago.  States he has been taking medication for his fever.  States fever will go away with Motrin or Tylenol but has been persistent and today fever was 103.3° at its maximum.  Denies any abdominal pain, nausea, vomiting, diarrhea.  Notes a few days ago he began noticing testicular pain and swelling, right greater than left.  This has persisted and worsened.  Denies any recent trauma or injury.  Denies any rash or discharge.  No other symptoms reported at this time.        Review of patient's allergies indicates:  No Known Allergies  Past Medical History:   Diagnosis Date    Avascular necrosis of bone of hip, left 10/30/2018    Coronary artery disease     DM w/o complication type II 2013    NSTEMI (non-ST elevated myocardial infarction) 2013     Past Surgical History:   Procedure Laterality Date    CARDIAC CATHETERIZATION      CORONARY ANGIOPLASTY WITH STENT PLACEMENT  2013    JACKIE RCA and LAD    LUMBAR LAMINECTOMY      TOTAL HIP ARTHROPLASTY Right 2011          No family history on file.  Social History     Tobacco Use    Smoking status: Former Smoker     Packs/day: 0.50     Years: 40.00     Pack years: 20.00     Types: Cigarettes     Quit date: 10/8/2019     Years since quittin.8    Smokeless tobacco: Never Used   Substance Use Topics    Alcohol use: Yes     Comment: social    Drug use: No     Review of Systems   Constitutional: Positive for chills and fever. Negative for fatigue.   HENT: Negative for congestion and sore throat.     Eyes: Negative for photophobia and visual disturbance.   Respiratory: Negative for cough and shortness of breath.    Cardiovascular: Negative for chest pain and palpitations.   Gastrointestinal: Negative for abdominal pain, diarrhea and vomiting.   Musculoskeletal: Negative for back pain, neck pain and neck stiffness.   Neurological: Negative for light-headedness, numbness and headaches.       Physical Exam     Initial Vitals [08/17/22 2005]   BP Pulse Resp Temp SpO2   116/61 (!) 117 18 (!) 100.4 °F (38 °C) 97 %      MAP       --         Physical Exam    Nursing note and vitals reviewed.  Constitutional: He appears well-developed and well-nourished. No distress.   HENT:   Head: Normocephalic and atraumatic.   Eyes: Conjunctivae and EOM are normal. Pupils are equal, round, and reactive to light.   Neck: Neck supple. No tracheal deviation present.   Normal range of motion.  Cardiovascular: Intact distal pulses.   Tachycardia    Pulmonary/Chest: No respiratory distress.   Abdominal: Abdomen is soft. He exhibits no distension. There is no abdominal tenderness.   Genitourinary:    Genitourinary Comments: Left testicle enlarged, tender; No rash or discharge appreciated      Musculoskeletal:         General: No tenderness. Normal range of motion.      Cervical back: Normal range of motion and neck supple.     Neurological: He is alert and oriented to person, place, and time. He has normal strength. No cranial nerve deficit. GCS score is 15. GCS eye subscore is 4. GCS verbal subscore is 5. GCS motor subscore is 6.   Skin: Skin is warm and dry.         ED Course   Critical Care    Date/Time: 8/18/2022 1:10 AM  Performed by: Gerardo Alfaro MD  Authorized by: eGrardo Alfaro MD   Direct patient critical care time: 15 minutes  Additional history critical care time: 15 minutes  Ordering / reviewing critical care time: 15 minutes  Documentation critical care time: 15 minutes  Consulting other physicians critical care  time: 15 minutes  Consult with family critical care time: 10 minutes  Total critical care time (exclusive of procedural time) : 85 minutes  Critical care time was exclusive of separately billable procedures and treating other patients.  Critical care was necessary to treat or prevent imminent or life-threatening deterioration of the following conditions: sepsis.  Critical care was time spent personally by me on the following activities: evaluation of patient's response to treatment, obtaining history from patient or surrogate, ordering and review of laboratory studies, pulse oximetry, review of old charts, vascular access procedures, re-evaluation of patient's condition, transcutaneous pacing, ordering and review of radiographic studies, ordering and performing treatments and interventions, examination of patient, interpretation of cardiac output measurements and development of treatment plan with patient or surrogate.        Labs Reviewed   CBC W/ AUTO DIFFERENTIAL - Abnormal; Notable for the following components:       Result Value    WBC 39.44 (*)     Hemoglobin 13.7 (*)     Gran % 77.0 (*)     Lymph % 5.0 (*)     Mono % 2.0 (*)     All other components within normal limits   COMPREHENSIVE METABOLIC PANEL - Abnormal; Notable for the following components:    CO2 20 (*)     Glucose 120 (*)     BUN 26 (*)     Anion Gap 18 (*)     All other components within normal limits   URINALYSIS, REFLEX TO URINE CULTURE - Abnormal; Notable for the following components:    Appearance, UA Hazy (*)     Protein, UA 1+ (*)     Occult Blood UA 1+ (*)     Leukocytes, UA 3+ (*)     All other components within normal limits    Narrative:     Specimen Source->Urine   PROCALCITONIN - Abnormal; Notable for the following components:    Procalcitonin 0.76 (*)     All other components within normal limits   URINALYSIS MICROSCOPIC - Abnormal; Notable for the following components:    RBC, UA 5 (*)     WBC, UA >100 (*)     Bacteria Many (*)      All other components within normal limits    Narrative:     Specimen Source->Urine   C. TRACHOMATIS/N. GONORRHOEAE BY AMP DNA   CULTURE, BLOOD   CULTURE, BLOOD   CULTURE, URINE   LACTIC ACID, PLASMA   LACTIC ACID, PLASMA   SARS-COV-2 RDRP GENE     EKG Readings: (Independently Interpreted)   Initial Reading: No STEMI. Previous EKG: Compared with most recent EKG Previous EKG Date: 10/24/2018 (Nonspecific change) . Rhythm: Normal Sinus Rhythm. Heart Rate: 97. Ectopy: No Ectopy. Conduction: Normal. ST Segments: Normal ST Segments. Axis: Normal.         X-Rays:   Independently Interpreted Readings:   Other Readings:  Imaging interpreted by radiologist and visualized by me:    Imaging Results          X-Ray Chest AP Portable (Final result)  Result time 08/18/22 00:48:34    Final result by Donald Recinos MD (08/18/22 00:48:34)                 Impression:      Stable chest allowing for removal of PICC line.      Electronically signed by: Donald Recinos  Date:    08/18/2022  Time:    00:48             Narrative:    EXAMINATION:  XR CHEST AP PORTABLE    CLINICAL HISTORY:  Sepsis;    TECHNIQUE:  Single frontal view of the chest was performed.    COMPARISON:  01/15/2022    FINDINGS:  The heart mediastinal contours appear stable with the trachea midline.  The lungs and pleural spaces are clear.  PICC line is been removed.                               US Scrotum And Testicles (Final result)  Result time 08/18/22 00:33:34    Final result by Donald Recinos MD (08/18/22 00:33:34)                 Impression:      Complex septated hydrocele on the right with mild increased vascular flow in the testicle.  Correlate for orchitis.    Minimally complex varicocele on the left which may be partially thrombosed with adjacent simple hydrocele.    Spermatoceles within the left testicle.      Electronically signed by: Donald Recinos  Date:    08/18/2022  Time:    00:33             Narrative:    EXAMINATION:  US SCROTUM AND  TESTICLES    CLINICAL HISTORY:  Testicular pain, unspecified    TECHNIQUE:  Sonography of the scrotum and testes.    COMPARISON:  None.    FINDINGS:  Right Testicle:    *Size: 4.3 x 3.6 x 3.0 cm  *Appearance: Homogeneous  *Flow: Normal arterial and venous flow  *Epididymis: Normal.  *Hydrocele: Complex septated  *Varicocele: None.  .    Left Testicle:    *Size: 4.1 x 3.1 x 2.0 cm  *Appearance: Homogeneous with small central benign appearing cyst or spermatocele  *Flow: Normal arterial and venous flow  *Epididymis: Normal.  *Hydrocele: Small simple  *Varicocele: None.  .    Other findings: None.                                Medications   lactated ringers bolus 1,000 mL (has no administration in time range)   lactated ringers bolus 500 mL (has no administration in time range)   acetaminophen tablet 650 mg (650 mg Oral Given 8/18/22 0012)   cefTRIAXone (ROCEPHIN) 2 g/50 mL D5W IVPB (2 g Intravenous New Bag 8/18/22 0000)   sodium chloride 0.9% bolus 1,000 mL (1,000 mLs Intravenous New Bag 8/18/22 0000)     Medical Decision Making:   Initial Assessment:   61-year-old male presents emergency department complaining of frequency, fever, right testicular pain and swelling  Differential Diagnosis:   UTI, epididymitis, testicular torsion, pyelonephritis, kidney stone  Independently Interpreted Test(s):   I have ordered and independently interpreted X-rays - see prior notes.  I have ordered and independently interpreted EKG Reading(s) - see prior notes  Clinical Tests:   Lab Tests: Reviewed       <> Summary of Lab: Concerning for significant leukocytosis, UTI  ED Management:  Patient met SIRS criteria.  I have ordered blood cultures and lactic acid.  I have ordered 2 g of Rocephin.  Urine has been sent for culture.  Patient received a total of 2.5 L of IV fluid, meeting the goal of 30 mL/kg of IV fluid for his ideal body weight of 84 kg.  Discussed with [facility] internal medicine who will see and admit the patient.  Informed  patient family of plan to admit for further evaluation and management and they are comfortable with plan at this time.  I have placed an a.m. consult for urology to see the patient in conjunction with the [facility] internal medicine team.                       Clinical Impression:   Final diagnoses:  [N50.819] Testicular pain  [N50.819] Testicular pain - fever  [R50.9] Fever  [A41.9] Sepsis, due to unspecified organism, unspecified whether acute organ dysfunction present (Primary)  [N45.3] Epididymoorchitis          ED Disposition Condition    Admit               Gerardo Alfaro MD  08/18/22 0116

## 2022-08-18 NOTE — PROGRESS NOTES
"Subjective:       Patient ID: Santiago Lucia is a 61 y.o. male.    Vitals:  height is 6' 3" (1.905 m) and weight is 99.8 kg (220 lb). His oral temperature is 103 °F (39.4 °C) (abnormal). His blood pressure is 132/71 and his pulse is 135 (abnormal). His respiration is 20 and oxygen saturation is 94% (abnormal).     Chief Complaint: Groin Pain (Testicles swollen, fever, cloudy urine)    This is a 61 y.o. male who presents today with a chief complaint of right testicle pain and swelling, that he notice last week. Today, he has fever. He also c/o dysuria, cloudy urine, and urinary frequency. Denies abdominal pain. Denies back/flank pain. Denies nausea, vomiting, and diarrhea.       Groin Pain  The patient's primary symptoms include penile discharge, scrotal swelling and testicular pain. This is a new problem. The current episode started 1 to 4 weeks ago. The problem occurs constantly. The problem has been gradually worsening. The pain is severe. Associated symptoms include discolored urine, dysuria, a fever, frequency, painful intercourse and urgency. Pertinent negatives include no constipation or diarrhea. The testicular pain affects the right testicle. There is swelling in the right testicle. The color of the testicles is normal. The penile discharge was thick and yellow. The symptoms are aggravated by activity. Treatments tried: drinking more water. The treatment provided no relief. He is sexually active. He never uses condoms. It is unknown whether or not his partner has an STD.       Constitution: Positive for fever.   Gastrointestinal: Negative for constipation and diarrhea.   Genitourinary: Positive for dysuria, frequency, urgency, penile discharge, scrotal swelling and testicular pain.       Objective:      Physical Exam   Constitutional: He is oriented to person, place, and time.  Non-toxic appearance. He does not appear ill. No distress.   HENT:   Head: Normocephalic.   Nose: Nose normal.   Mouth/Throat: " Mucous membranes are moist. Oropharynx is clear.   Eyes: Right eye exhibits no discharge. Left eye exhibits no discharge.   Neck: No neck rigidity present.   Cardiovascular: Regular rhythm and normal heart sounds. Tachycardia present.   Pulmonary/Chest: Effort normal and breath sounds normal. No respiratory distress.   Abdominal: Normal appearance and bowel sounds are normal. He exhibits no distension. Soft. flat abdomen There is no abdominal tenderness. There is no rebound, no guarding, no left CVA tenderness and no right CVA tenderness.   Genitourinary:    Penis normal.   Circumcised.         Musculoskeletal: Normal range of motion.         General: Normal range of motion.   Neurological: He is alert and oriented to person, place, and time.   Skin: Skin is warm, dry and not diaphoretic.   Psychiatric: His behavior is normal. Mood normal.   Nursing note and vitals reviewed.chaperone present           Assessment:       1. Orchitis    2. Dysuria    3. Fever, unspecified fever cause          Plan:         Orchitis  -     POCT Urinalysis, Dipstick, Automated, W/O Scope  -     Refer to Emergency Dept.    Dysuria    Fever, unspecified fever cause  -     POCT Urinalysis, Dipstick, Automated, W/O Scope  -     ibuprofen tablet 800 mg  -     Refer to Emergency Dept.           Medical Decision Making:   Initial Assessment:   Orchitis, fever, rule-out sepsis   Differential Diagnosis:   Testicular torsion, epididymitis, inguinal hernia  Urgent Care Management:  Pt requires further work-up: will send to ER at this time  Other:   I have discussed this case with another health care provider.       <> Summary of the Discussion: Dr. Castano informed and agrees with plan

## 2022-08-18 NOTE — ED NOTES
Pt c/o fever and swelling to testicles x ~ one week, worsening over the last three days. Pt denies other complaints. Endorses that urination and other functions are undisturbed. +A/O x 4 w/ ABCs intact, NAD. VSS.     Review of patient's allergies indicates:  No Known Allergies     Patient has verified the spelling of their name and  on armband.   APPEARANCE: Patient is alert, calm, oriented x 4, and does not appear distressed.  SKIN: Skin is normal for race, warm, and dry. Normal skin turgor and mucous membranes moist.  CARDIAC: Normal rate and rhythm, no murmur heard.   RESPIRATORY:Normal rate and effort. Breath sounds clear bilaterally throughout chest. Respirations are equal and unlabored.    GASTRO: Bowel sounds normal, abdomen is soft, no tenderness, and no abdominal distention.  MUSCLE: Full ROM. No bony tenderness or soft tissue tenderness. No obvious deformity.  PERIPHERAL VASCULAR: peripheral pulses present. Normal cap refill. No edema. Warm to touch.  NEURO: 5/5 strength major flexors/extensors bilaterally. Sensory intact to light touch bilaterally. Olivier coma scale: eyes open spontaneously-4, oriented & converses-5, obeys commands-6. No neurological abnormalities.   MENTAL STATUS: awake, alert and aware of environment.  GENITALIA: Edema to testicles  : Voids without complication      ED w/u and orders in progress. Pt SR up x 2. Bed in lowest position with wheels locked. Call bell within reach of pt.

## 2022-08-18 NOTE — PROGRESS NOTES
08/18/22 0410   Admission   Initial VN Admission Questions Complete   Shift   Virtual Nurse - Patient Verbalized Approval Of Camera Use;VN Rounding   Safety/Activity   Patient Rounds bed in low position;call light in patient/parent reach;clutter free environment maintained;visualized patient;placement of personal items at bedside   Safety Promotion/Fall Prevention assistive device/personal item within reach;Fall Risk reviewed with patient/family;side rails raised x 2   Positioning   Body Position supine   Head of Bed (HOB) Positioning HOB at 30-45 degrees   VN cued in to pt's room with permission. Admission questions completed. Plan of care reviewed with pt. Pt denies any questions or concerns at this time. Call bell w/in reach. Instructed to call for needs/assist oob.

## 2022-08-18 NOTE — FIRST PROVIDER EVALUATION
Emergency Department TeleTriage Encounter Note      CHIEF COMPLAINT    Chief Complaint   Patient presents with    Male  Problem     States last week right testicle started to become enlarged. Has increased with tenderness over the past 3 days. Fever that started last week. Denies dysuria. Was seen at [facility] [place]. Temp was 103. Tylenol given.        VITAL SIGNS   Initial Vitals [08/17/22 2005]   BP Pulse Resp Temp SpO2   116/61 (!) 117 18 (!) 100.4 °F (38 °C) 97 %      MAP       --            ALLERGIES    Review of patient's allergies indicates:  No Known Allergies    PROVIDER TRIAGE NOTE  This is a teletriage evaluation of a 61 y.o. male presenting to the ED with c/o dysuria (resolved) testicular pain, swelling, fever. Sent from urgent care. Patient is sexually active. No rectal pain.     PE:. Non-toxic/well-appearing. No respiratory distress, speaks in full sentences without issue. No active emesis nor cough. Normal eye contact and mentation.     Plan: labs, US, meds. Further/augmented workup at discretion of examining provider.     All ED beds are full at present; patient notified of this status.  Patient seen and medically screened by [facility] via teletriage. Orders initiated at triage to expedite care.  Patient is stable and will be placed in an ED bed when available.  Care will be transferred to an alternate provider when patient has been placed in an Exam Room further exam, additional orders, and disposition.         ORDERS  Labs Reviewed   CBC W/ AUTO DIFFERENTIAL   COMPREHENSIVE METABOLIC PANEL   URINALYSIS, REFLEX TO URINE CULTURE       ED Orders (720h ago, onward)    Start Ordered     Status Ordering Provider    08/17/22 2130 08/17/22 2127  sodium chloride 0.9% bolus 1,000 mL  ED 1 Time         Ordered KELSI GOLDBERG    08/17/22 2130 08/17/22 2127  naproxen tablet 500 mg  ED 1 Time         Ordered KELSI GOLDBERG    08/17/22 2128 08/17/22 2127  C. trachomatis/N. gonorrhoeae by AMP DNA [facility]; Urine  STAT          Ordered KELSI GOLDBERG C.    08/17/22 2127 08/17/22 2126  CBC auto differential  STAT         Ordered ASHLYN FAWAD.    08/17/22 2127 08/17/22 2126  Comprehensive metabolic panel  STAT         Ordered ASHLYN FAWAD.    08/17/22 2127 08/17/22 2126  Urinalysis, Reflex to Urine Culture Urine, Clean Catch  STAT         Ordered ASHLYN FAWAD.    08/17/22 2127 08/17/22 2126  US Scrotum And Testicles  1 time imaging         Ordered KELSI GOLDBERG            Virtual Visit Note: The provider triage portion of this emergency department evaluation and documentation was performed via [employer], a HIPAA-compliant telemedicine application, in concert with a tele-presenter in the room. A face to face patient evaluation with one of my colleagues will occur once the patient is placed in an emergency department room.      DISCLAIMER: This note was prepared with [employer] voice recognition transcription software. Garbled syntax, mangled pronouns, and other bizarre constructions may be attributed to that software system.

## 2022-08-18 NOTE — CONSULTS
[facility] - Telemetry  Urology  Consult Note    Patient Name: Santiago Lucia  MRN: [id number]  Admission Date: 2022  Attending Provider: Matthew Hawkins MD   Consulting Provider: Jeremy Alcala MD  Principal Problem:Epididymoorchitis    Consults    Subjective:     HPI:   Santiago Lucia is a 61 y.o. male who presents with right testicular pain.  Notes pain for about a week and worsening over last few days.  Pain is dull/ache and sharp at times to right testicle.  No radiation to flank.  No N/V.  Notes no dysuria or hematuria.  Mild LUTs.  Fever at home and worsening pain prompted ED visit.  No h/o this.     Past Medical History:   Diagnosis Date    Avascular necrosis of bone of hip, left 10/30/2018    Coronary artery disease     DM w/o complication type II 2013    NSTEMI (non-ST elevated myocardial infarction) 2013       Past Surgical History:   Procedure Laterality Date    CARDIAC CATHETERIZATION      CORONARY ANGIOPLASTY WITH STENT PLACEMENT  2013    JACK[facility] RCA and LAD    LUMBAR LAMINECTOMY  2011    TOTAL HIP ARTHROPLASTY Right 2011            Review of patient's allergies indicates:  No Known Allergies    Family History    None         Tobacco Use    Smoking status: Former Smoker     Packs/day: 0.50     Years: 40.00     Pack years: 20.00     Types: Cigarettes     Quit date: 10/8/2019     Years since quittin.8    Smokeless tobacco: Never Used   Substance and Sexual Activity    Alcohol use: Yes     Comment: social    Drug use: No    Sexual activity: Yes     Partners: Female       Review of Systems   Constitutional: Positive for chills, fatigue and fever. Negative for activity change.   Respiratory: Negative for chest tightness and shortness of breath.    Cardiovascular: Negative for chest pain and palpitations.   Gastrointestinal: Negative for abdominal distention, abdominal pain, nausea and vomiting.   Genitourinary: Positive for scrotal swelling and testicular pain. Negative  for difficulty urinating, flank pain, frequency, hematuria, nocturia and urgency.   Musculoskeletal: Negative for arthralgias and neck pain.   Skin: Negative for wound.   Neurological: Negative for dizziness.   Psychiatric/Behavioral: Negative for confusion.       Objective:     Temp:  [98.4 °F (36.9 °C)-103 °F (39.4 °C)] 98.5 °F (36.9 °C)  Pulse:  [] 87  Resp:  [18-20] 18  SpO2:  [94 %-100 %] 98 %  BP: (116-137)/(61-71) 134/66       NAD  RRR  CTAB  ABD S/ND/NTTP       Penis normal, right testicle/epididymis firm and ttp with reactive hydrocele, no fluctuation, no erythema or necrosis/crepitus       Ext: no edema      Significant Labs:  BMP:  Recent Labs   Lab 08/17/22  2318 08/18/22  0415    140   K 4.4 4.2    102   CO2 20* 25   BUN 26* 22   CREATININE 1.3 1.2   CALCIUM 9.2 8.9       CBC:  Recent Labs   Lab 08/17/22  2318   WBC 39.44*   HGB 13.7*   HCT 42.4          Urinalysis  Recent Labs   Lab 08/17/22  2242   COLORU Yellow   SPECGRAV 1.020   PHUR 6.0   PROTEINUA 1+*   BACTERIA Many*   NITRITE Negative   LEUKOCYTESUR 3+*   UROBILINOGEN Negative   HYALINECASTS 0       No results found for this or any previous visit (from the past 2160 hour(s)).    Significant Imaging:  JOSR right reactive septated hydrocele, right epiorch      Assessment:     Problem Noted   Epididymoorchitis 8/18/2022    Right epididymo-orchitis on exam and JOSR, reactive hydrocele  Leukocytosis, fever       Plan:    1. GUDELIA given mild LORI, microhematuria  2. Scrotal support  3. Continue antibiotic and follow up cultures, outpatient treatment with fluoroquinolone once cultures final  4. No need for surgical intervention at this time  5. Will follow    Thank you for your consult.     Jeremy Alcala MD  [facility]-[place]

## 2022-08-18 NOTE — PLAN OF CARE
08/18/22 1016   Post-Acute Status   Post-Acute Authorization Other   Other Status Awaiting f/u Appts      Follow-up Information       Yon Asif MD. Go in 1 week(s).    Specialty: Internal Medicine  Why: FOLLOW UP WITH PCP  Contact information:  [address]  [address]  [facility] [id number] [phone]               [facility] - [facility]. Go in 2 week(s).    Specialty: Urology  Why: UROLOGY APPOINTMENT AFTER DISCHARGE  Contact information:  [address] [facility] [address]  [facility] [address] [phone]  Additional information:  Please park in Lot C or D and use [employer] law. Take [facility] elevators.

## 2022-08-18 NOTE — PLAN OF CARE
[facility] - Telemetry  Initial Discharge Assessment       Primary Care Provider: Yon Asif MD    Admission Diagnosis: Testicular pain [N50.819]  Epididymoorchitis [N45.3]  Fever [R50.9]  Sepsis, due to unspecified organism, unspecified whether acute organ dysfunction present [A41.9]    Admission Date: 8/17/2022  Expected Discharge Date:   Pt oriented. DCA done at bedside with patient. Demographics/Ins/NextofKin/PCP verified with patient/family and updated as needed. Denies any DME needs at present from pt/family. Patient/family plans to return home with family. Educated on bedside RX. Patient consents for TN to discuss discharge plan with next of kin. Preferences appointment times and location obtained. Patient reports they will have transportation home upon discharge.      Discharge Barriers Identified: (P) None    Payor: BLUE CROSS BLUE SHIELD / Plan: BCBS ALL OUT OF STATE / Product Type: PPO /     Extended Emergency Contact Information  Primary Emergency Contact: Nena Lucia   [facility]  Home Phone: [phone]  Relation: Mother    Discharge Plan A: (P) Home         [id number] - [place], [place] - [address]  [address]  [facility] [address]  Phone: [phone] Fax: [phone]    [facility] - [place] - [address] [place] [address]  [address] [place] [address]  [place] [address]  Phone: [phone] Fax: [phone]    [facility] [place]  [address]  [place] [address]  Phone: [phone] Fax: [phone]      Initial Assessment (most recent)       Adult Discharge Assessment - 08/18/22 1453          Discharge Assessment    Assessment Type Discharge Planning Assessment (P)      Confirmed/corrected address, phone number and insurance Yes (P)      Confirmed Demographics Correct on Facesheet (P)      Source of Information patient (P)      Lives With spouse (P)      Do you expect to return to  "your current living situation? Yes (P)      Do you have help at home or someone to help you manage your care at home? Yes (P)      Prior to hospitilization cognitive status: Alert/Oriented;No Deficits (P)      Current cognitive status: Alert/Oriented;No Deficits (P)      Walking or Climbing Stairs Difficulty none (P)      Dressing/Bathing Difficulty none (P)      Equipment Currently Used at Home none (P)      Readmission within 30 days? No (P)      Patient currently being followed by outpatient case management? No (P)      Do you take prescription medications? Yes (P)      Do you have any problems affording any of your prescribed medications? No (P)      Is the patient taking medications as prescribed? yes (P)      How do you get to doctors appointments? family or friend will provide;car, drives self (P)      Are you on dialysis? No (P)      Do you take coumadin? No (P)      Discharge Plan A Home (P)      DME Needed Upon Discharge  none (P)      Discharge Plan discussed with: Spouse/sig other (P)      Discharge Barriers Identified None (P)                    Future Appointments   Date Time Provider Department Center   8/22/2022  3:30 PM Yon Asif MD [facility]   8/23/2022  2:30 PM Jackie Calderon DPM [facility]   8/26/2022  9:50 AM Jeremy Alcala MD [facility] UROLOGY [facility]     BP (!) 144/72 (BP Location: Left arm, Patient Position: Lying)   Pulse 84   Temp 98.5 °F (36.9 °C) (Oral)   Resp 18   Ht 6' 3" (1.905 m)   Wt 99.3 kg (218 lb 14.7 oz)   SpO2 98%   BMI 27.36 kg/m²      ascorbic acid (vitamin C)  500 mg Oral Daily    aspirin  81 mg Oral Daily    atorvastatin  40 mg Oral Daily    cefTRIAXone (ROCEPHIN) IVPB  1 g Intravenous Q24H    enoxaparin  40 mg Subcutaneous Daily    magnesium oxide  400 mg Oral Daily    tamsulosin  0.4 mg Oral Daily    vitamin D  1,000 Units Oral Daily    zinc sulfate  220 mg Oral Daily                  "

## 2022-08-18 NOTE — PHARMACY MED REC
"Admission Medication History     The home medication history was taken by Chon Brown.    You may go to "Admission" then "Reconcile Home Medications" tabs to review and/or act upon these items.      The home medication list has been updated by the Pharmacy department.    Please read ALL comments highlighted in Red.    Please address this information as you see fit.     Feel free to contact us if you have any questions or require assistance.      Medications listed below were obtained from: Patient/family  Facility-Administered Medications Prior to Admission   Medication    [COMPLETED] ibuprofen tablet 800 mg     PTA Medications   Medication Sig    ascorbic acid, vitamin C, (VITAMIN C) 500 MG tablet Take 500 mg by mouth once daily.    aspirin (ECOTRIN) 81 MG EC tablet Take 1 tablet (81 mg total) by mouth once daily.    COLLAGEN MISC Take 1 tablet by mouth once daily.    diclofenac (VOLTAREN) 75 MG EC tablet Take 1 tablet (75 mg total) by mouth 2 (two) times daily. (Patient taking differently: Take 75 mg by mouth once daily.)    HYDROcodone-acetaminophen (NORCO) 7.5-325 mg per tablet Take 1 tablet by mouth every 12 (twelve) hours as needed for Pain.    losartan (COZAAR) 25 MG tablet Take 1 tablet (25 mg total) by mouth once daily.    magnesium oxide (MAG-OX) 400 mg (241.3 mg magnesium) tablet Take 400 mg by mouth once daily.    metoprolol succinate (TOPROL-XL) 25 MG 24 hr tablet TAKE 1 TABLET(25 MG) BY MOUTH EVERY DAY    rosuvastatin (CRESTOR) 10 MG tablet TAKE 1 TABLET(10 MG) BY MOUTH EVERY DAY    tadalafiL (CIALIS) 20 MG Tab Take 1 tablet (20 mg total) by mouth as needed.    vitamin D (VITAMIN D3) 1000 units Tab Take 1,000 Units by mouth once daily.    zinc sulfate (ZINCATE) 50 mg zinc (220 mg) capsule Take 220 mg by mouth once daily.    rivaroxaban (XARELTO) 2.5 mg Tab Take 1 tablet (2.5 mg total) by mouth 2 (two) times daily with meals.       Chon Brown, PharmD              .          "
no

## 2022-08-18 NOTE — ED NOTES
No new complaints. VSS. Awaiting further. Wife at bedside. Pt SR up x 2. Bed in lowest position with wheels locked. Call bell within reach of pt.

## 2022-08-18 NOTE — H&P
[facility] Medicine H&P Note     Admitting Team: \[facility] [place] [facility]\"" Hospitalist Team B  Attending Physician: Matthew Hawkins MD  Resident: Dr. Mares   Intern: Dr. Flores    Date of Admit: 8/17/2022    Chief Complaint     Right testicular pain x1 week    Subjective:      History of Present Illness:  Patient is a 61-year-old male with history of CAD, peripheral vascular disease, hypertension presenting with 1 week of progressive right scrotal pain.  Patient states that about 1 week ago he noticed a dull, constant pain in his right testicle.  At that time patient also noticed increased urinary frequency, burning with urination, and change in color and smell.  The symptoms progressively got worse over the course of a week and starting on Monday, 3 days prior to arrival, patient noticed worsening swelling to his right testicle with worsening pain.  Patient also has associated fever at that time.  Patient denies any penile pain or discharge, flank pain, abdominal pain, nausea, vomiting.  Patient with no prior symptoms like this before.  Patient does wake up about 1 time per night to urinate but has no known history of prostate problems.  Patient is sexually active with his spouse and reports not using condoms.  Patient has been taking Tylenol and his home prescribed hydrocodone 7.5 mg for pain.      Past Medical History:  Past Medical History:   Diagnosis Date    Avascular necrosis of bone of hip, left 10/30/2018    Coronary artery disease     DM w/o complication type II 8/9/2013    NSTEMI (non-ST elevated myocardial infarction) 01/2013     Past Surgical History:  Past Surgical History:   Procedure Laterality Date    CARDIAC CATHETERIZATION      CORONARY ANGIOPLASTY WITH STENT PLACEMENT  01/2013    JACKIE RCA and LAD    LUMBAR LAMINECTOMY  2011    TOTAL HIP ARTHROPLASTY Right 12/2011          Allergies:  NKA    Home Medications:  Medications were verified at bedside with patient and spouse  Aspirin 81 mg  "daily  Rosuvastatin 10 mg daily  Losartan 25 mg daily  Metoprolol succinate 25 mg daily  Vitamin-D 1000 IU daily  Zinc sulfate 220 mg daily  Vitamin-C 500 mg daily  Magnesium oxide 400 mg daily  Hydrocodone acetaminophen 7.5 mg p.r.n.    Patient prescribed Xarelto 2.5 mg b.i.d. with meals for PVD but has been out of this for months.    Family History:  No family history on file.    Social History:  Lives in [place] with spouse  Works in [employer] business  History of smoking, currently quit 1 year ago  About 2 drinks per day  Denies any marijuana or drug use    Review of Systems:  All other systems are reviewed and are negative.    Health Maintaince :   Primary Care Physician: Dr. Asif    Immunizations:   TDap Not UTD  Flu UTD  Covid: 2 vaccines, 1 booster    Cancer Screening:  Colonoscopy: Not UTD     Objective:   Last 24 Hour Vital Signs:  BP  Min: 116/61  Max: 132/71  Temp  Av.6 °F (38.1 °C)  Min: 98.5 °F (36.9 °C)  Max: 103 °F (39.4 °C)  Pulse  Av.5  Min: 93  Max: 135  Resp  Av  Min: 18  Max: 20  SpO2  Av.5 %  Min: 94 %  Max: 100 %  Height  Av' 3" (190.5 cm)  Min: 6' 3" (190.5 cm)  Max: 6' 3" (190.5 cm)  Weight  Av.8 kg (220 lb)  Min: 99.8 kg (220 lb)  Max: 99.8 kg (220 lb)  Body mass index is 27.5 kg/m².  No intake/output data recorded.    Physical Examination:  GEN- AOx3, NAD, laying comfortably in bed  HEENT- PERRL, EOMI, mucous membrane dry  NECK- No thyromegaly or other masses, JVP approximately 5 cm H20  CARDIAC- RRR, no murmurs or rubs  RESP- CTAB, normal work of breathing, no wheezes, crackles, or rhonchi  ABD- Soft, NT, ND, +BS; no masses or HSM  - no tenderness to penile shaft, no discharge.  Right scrotum is significantly larger than left with firmness and tenderness.  No inguinal hernia appreciated.  No erythema or skin breakdown. Large prostate, no tenderness.   EXT- No clubbing, cyanosis, or edema; 2+ DP/PT pulses  NEURO- CN II-XII grossly intact; " moves all four extremities equally  SKIN- Warm and dry; no rashes    Laboratory:  Most Recent Data:  CBC:   Lab Results   Component Value Date    WBC 39.44 (H) 08/17/2022    HGB 13.7 (L) 08/17/2022    HCT 42.4 08/17/2022     08/17/2022    MCV 88 08/17/2022    RDW 14.2 08/17/2022     WBC Differential: 77 % N, 15 % Bands, 5 % L, 2 % M, 0 % Eo, 0 % Baso, 0 additional cells seen  BMP:   Lab Results   Component Value Date     08/17/2022    K 4.4 08/17/2022     08/17/2022    CO2 20 (L) 08/17/2022    BUN 26 (H) 08/17/2022    CREATININE 1.3 08/17/2022     (H) 08/17/2022    CALCIUM 9.2 08/17/2022    MG 2.0 01/13/2022     LFTs:   Lab Results   Component Value Date    PROT 7.3 08/17/2022    ALBUMIN 3.6 08/17/2022    BILITOT 0.7 08/17/2022    AST 18 08/17/2022    ALKPHOS 89 08/17/2022    ALT 20 08/17/2022     FLP:   Lab Results   Component Value Date    CHOL 147 10/05/2021    HDL 44 10/05/2021    LDLCALC 78.6 10/05/2021    TRIG 122 10/05/2021    CHOLHDL 29.9 10/05/2021     DM:   Lab Results   Component Value Date    HGBA1C 6.0 (H) 01/11/2022    HGBA1C 6.0 (H) 07/30/2021    HGBA1C 7.1 (H) 01/16/2013    LDLCALC 78.6 10/05/2021    CREATININE 1.3 08/17/2022     Urinalysis:   Lab Results   Component Value Date    COLORU Yellow 08/17/2022    SPECGRAV 1.020 08/17/2022    NITRITE Negative 08/17/2022    KETONESU Negative 08/17/2022    UROBILINOGEN Negative 08/17/2022    WBCUA >100 (H) 08/17/2022       Other Results:  EKG (my interpretation):  Normal sinus rhythm, normal axis, normal intervals, T-wave inversion in V2 which was not noted on prior    Radiology:  CXR 8/17/2022: No acute findings    US scrotum and testicles 8/17/2022:   - Complex septated hydrocele on the right with mild increased vascular flow in the testicle.  Correlate for orchitis.   - Minimally complex varicocele on the left which may be partially thrombosed with adjacent simple hydrocele.  - Spermatoceles within the left  testicle.    Assessment:     61-year-old male with history of hypertension, diabetes, CAD, PVD presenting with fever and right scrotal pain with lab work and imaging concerning for orchitis and UTI, acute kidney injury, and profound leukocytosis.    Plan:     Sepsis secondary to acute orchitis and UTI with profound leukocytosis  - Febrile to 103, , WBC count 39 with 15% bands, lactic acid 1.7  - UA with 3+ LE, >100 WBC, and many bacteria. US concerning for orchitis.  -  Will give 30 cc/kg  - Blood cultures ordered  - Continue Rocephin 1g q24 and Doxycycline 100 mg BID  - G/C amp test collected   - Urology consult in AM.   - Will start Tamsulosin due to concern for BPH    Acute kidney injury  - Suspect 2/2 sepsis, IVF as above and monitor    Anion gap metabolic acidosis  - Suspect 2/2 sepsis , will monitor    Diabetes  - Last A1C last year 6.0  - Will recheck here     Hypertension  - Will hold Metoprolol and Losartan in setting of sepsis and LORI  - Likely resume tomorrow    Hyperlipidemia  - Continue home rosuvastatin 10 mg    CAD s/p stent 2013  - Continue home asa 81 mg daily  - Continue statin     Peripheral vascular disease  - Continue home ASA 81 mg  - Patient not taking previously prescribed Xarelto 2.5 mg BID. Will hold for now and have patient follow up with Cardiolgy    Avascular necrosis of the left hip and history of right hip replacement  - Home Hydrocodone ordered PRN  - Vit D, Vit C, and zinc ordered    Diet: Cardiac  DVT ppx: Lovenox  Code: Full  Dispo: Admit for abx, urology eval. Possible DC tomorrow if fever, LORI, and WBC count improve.     Oren Casey MD  [facility]ospitals Internal Medicine HO-V    hospitals Medicine Hospitalist Pager numbers:   hospitals Hospitalist Medicine Team A (Daniel/Samanta): [phone]  hospitals Hospitalist Medicine Team B (Tita/Ross):  [phone]         [place]

## 2022-08-18 NOTE — PLAN OF CARE
Problem: Adult Inpatient Plan of Care  Goal: Plan of Care Review  Outcome: Ongoing, Progressing  Goal: Patient-Specific Goal (Individualized)  Outcome: Ongoing, Progressing  Goal: Absence of Hospital-Acquired Illness or Injury  Outcome: Ongoing, Progressing  Goal: Optimal Comfort and Wellbeing  Outcome: Ongoing, Progressing  Goal: Readiness for Transition of Care  Outcome: Ongoing, Progressing     Problem: Diabetes Comorbidity  Goal: Blood Glucose Level Within Targeted Range  Outcome: Ongoing, Progressing     Problem: Fluid and Electrolyte Imbalance (Acute Kidney Injury/Impairment)  Goal: Fluid and Electrolyte Balance  Outcome: Ongoing, Progressing     Problem: Oral Intake Inadequate (Acute Kidney Injury/Impairment)  Goal: Optimal Nutrition Intake  Outcome: Ongoing, Progressing     Problem: Renal Function Impairment (Acute Kidney Injury/Impairment)  Goal: Effective Renal Function  Outcome: Ongoing, Progressing     Problem: Impaired Wound Healing  Goal: Optimal Wound Healing  Outcome: Ongoing, Progressing     Problem: Adjustment to Illness (Sepsis/Septic Shock)  Goal: Optimal Coping  Outcome: Ongoing, Progressing

## 2022-08-19 VITALS
HEART RATE: 73 BPM | RESPIRATION RATE: 18 BRPM | TEMPERATURE: 97 F | WEIGHT: 218.94 LBS | HEIGHT: 75 IN | DIASTOLIC BLOOD PRESSURE: 71 MMHG | OXYGEN SATURATION: 96 % | SYSTOLIC BLOOD PRESSURE: 152 MMHG | BODY MASS INDEX: 27.22 KG/M2

## 2022-08-19 PROBLEM — N17.9 ACUTE KIDNEY INJURY: Status: RESOLVED | Noted: 2022-08-18 | Resolved: 2022-08-19

## 2022-08-19 PROBLEM — E87.29 HIGH ANION GAP METABOLIC ACIDOSIS: Status: RESOLVED | Noted: 2022-08-18 | Resolved: 2022-08-19

## 2022-08-19 PROBLEM — N50.819 TESTICULAR PAIN: Status: ACTIVE | Noted: 2022-08-19

## 2022-08-19 LAB
BASOPHILS NFR BLD: 1 % (ref 0–1.9)
DIFFERENTIAL METHOD: ABNORMAL
EOSINOPHIL NFR BLD: 0 % (ref 0–8)
ERYTHROCYTE [DISTWIDTH] IN BLOOD BY AUTOMATED COUNT: 14.3 % (ref 11.5–14.5)
HCT VFR BLD AUTO: 36.8 % (ref 40–54)
HGB BLD-MCNC: 12 G/DL (ref 14–18)
IMM GRANULOCYTES # BLD AUTO: ABNORMAL K/UL (ref 0–0.04)
IMM GRANULOCYTES NFR BLD AUTO: ABNORMAL % (ref 0–0.5)
LYMPHOCYTES NFR BLD: 3 % (ref 18–48)
MCH RBC QN AUTO: 28.8 PG (ref 27–31)
MCHC RBC AUTO-ENTMCNC: 32.6 G/DL (ref 32–36)
MCV RBC AUTO: 88 FL (ref 82–98)
MONOCYTES NFR BLD: 2 % (ref 4–15)
NEUTROPHILS NFR BLD: 90 % (ref 38–73)
NEUTS BAND NFR BLD MANUAL: 4 %
NRBC BLD-RTO: 0 /100 WBC
PLATELET # BLD AUTO: 248 K/UL (ref 150–450)
PLATELET BLD QL SMEAR: ABNORMAL
PMV BLD AUTO: 9.6 FL (ref 9.2–12.9)
RBC # BLD AUTO: 4.17 M/UL (ref 4.6–6.2)
WBC # BLD AUTO: 30.56 K/UL (ref 3.9–12.7)

## 2022-08-19 PROCEDURE — 94761 N-INVAS EAR/PLS OXIMETRY MLT: CPT

## 2022-08-19 PROCEDURE — 25000003 PHARM REV CODE 250: Performed by: STUDENT IN AN ORGANIZED HEALTH CARE EDUCATION/TRAINING PROGRAM

## 2022-08-19 PROCEDURE — 85025 COMPLETE CBC W/AUTO DIFF WBC: CPT | Performed by: STUDENT IN AN ORGANIZED HEALTH CARE EDUCATION/TRAINING PROGRAM

## 2022-08-19 PROCEDURE — 99232 SBSQ HOSP IP/OBS MODERATE 35: CPT | Mod: ,,, | Performed by: UROLOGY

## 2022-08-19 PROCEDURE — 36415 COLL VENOUS BLD VENIPUNCTURE: CPT | Performed by: STUDENT IN AN ORGANIZED HEALTH CARE EDUCATION/TRAINING PROGRAM

## 2022-08-19 PROCEDURE — 99232 PR SUBSEQUENT HOSPITAL CARE,LEVL II: ICD-10-PCS | Mod: ,,, | Performed by: UROLOGY

## 2022-08-19 RX ORDER — METOPROLOL SUCCINATE 25 MG/1
25 TABLET, EXTENDED RELEASE ORAL DAILY
Status: DISCONTINUED | OUTPATIENT
Start: 2022-08-19 | End: 2022-08-19 | Stop reason: HOSPADM

## 2022-08-19 RX ORDER — CIPROFLOXACIN 500 MG/1
500 TABLET ORAL EVERY 12 HOURS
Qty: 42 TABLET | Refills: 0
Start: 2022-08-19 | End: 2022-08-19 | Stop reason: HOSPADM

## 2022-08-19 RX ORDER — HYDROCODONE BITARTRATE AND ACETAMINOPHEN 5; 325 MG/1; MG/1
1 TABLET ORAL EVERY 8 HOURS PRN
Qty: 9 TABLET | Refills: 0 | Status: SHIPPED | OUTPATIENT
Start: 2022-08-19 | End: 2022-08-22 | Stop reason: SDUPTHER

## 2022-08-19 RX ORDER — LOSARTAN POTASSIUM 25 MG/1
25 TABLET ORAL DAILY
Status: DISCONTINUED | OUTPATIENT
Start: 2022-08-19 | End: 2022-08-19 | Stop reason: HOSPADM

## 2022-08-19 RX ORDER — CIPROFLOXACIN 500 MG/1
500 TABLET ORAL EVERY 12 HOURS
Qty: 56 TABLET | Refills: 0 | Status: SHIPPED | OUTPATIENT
Start: 2022-08-19 | End: 2022-08-19 | Stop reason: HOSPADM

## 2022-08-19 RX ORDER — LEVOFLOXACIN 750 MG/1
750 TABLET ORAL DAILY
Qty: 21 TABLET | Refills: 0 | Status: SHIPPED | OUTPATIENT
Start: 2022-08-19 | End: 2022-08-19 | Stop reason: HOSPADM

## 2022-08-19 RX ORDER — LEVOFLOXACIN 750 MG/1
750 TABLET ORAL DAILY
Qty: 21 TABLET | Refills: 0 | Status: SHIPPED | OUTPATIENT
Start: 2022-08-19 | End: 2023-07-27

## 2022-08-19 RX ADMIN — OXYCODONE HYDROCHLORIDE AND ACETAMINOPHEN 500 MG: 500 TABLET ORAL at 08:08

## 2022-08-19 RX ADMIN — TAMSULOSIN HYDROCHLORIDE 0.4 MG: 0.4 CAPSULE ORAL at 08:08

## 2022-08-19 RX ADMIN — METOPROLOL SUCCINATE 25 MG: 25 TABLET, EXTENDED RELEASE ORAL at 08:08

## 2022-08-19 RX ADMIN — ZINC SULFATE 220 MG (50 MG) CAPSULE 220 MG: CAPSULE at 08:08

## 2022-08-19 RX ADMIN — HYDROCODONE BITARTRATE AND ACETAMINOPHEN 1 TABLET: 5; 325 TABLET ORAL at 09:08

## 2022-08-19 RX ADMIN — Medication 400 MG: at 08:08

## 2022-08-19 RX ADMIN — LOSARTAN POTASSIUM 25 MG: 25 TABLET, FILM COATED ORAL at 08:08

## 2022-08-19 RX ADMIN — CHOLECALCIFEROL TAB 25 MCG (1000 UNIT) 1000 UNITS: 25 TAB at 08:08

## 2022-08-19 RX ADMIN — ASPIRIN 81 MG: 81 TABLET, COATED ORAL at 08:08

## 2022-08-19 RX ADMIN — ATORVASTATIN CALCIUM 40 MG: 40 TABLET, FILM COATED ORAL at 08:08

## 2022-08-19 NOTE — DISCHARGE SUMMARY
[facility] Discharge Summary    Primary Team: [facility]   Attending Physician: Matthew Hawkins MD  Resident: Jesusita    Date of Admit: 8/17/2022  Date of Discharge: 8/19/2022    Discharge to: Home  Condition: Improved     Discharge Diagnoses     Patient Active Problem List   Diagnosis    NSTEMI (non-ST elevated myocardial infarction)    HTN (hypertension)    Dyslipidemia    CAD (coronary artery disease)    S/P angioplasty with stent    PVD (peripheral vascular disease)    Status post right hip replacement    Infection of nail bed of toe of right foot    Coronary artery disease    Avascular necrosis of bone of hip, left    Ingrowing toenail    ED (erectile dysfunction) of organic origin    Pain in joint involving pelvic region and thigh    Polyneuropathy due to type 2 diabetes mellitus    Type 2 diabetes mellitus with diabetic peripheral angiopathy without gangrene    Decreased range of motion of hip    Decreased strength    Decreased functional mobility and endurance    Cellulitis of great toe of right foot    Ulcer of great toe, right, with fat layer exposed    Epididymoorchitis    UTI (urinary tract infection)    Leukocytosis    Testicular pain       Consultants and Procedures     Consultants:  Urology   Procedures:   None    Brief History of Present Illness   Mr. Lucia is a 61-year-old male with history of CAD, peripheral vascular disease, hypertension presenting with 1 week of progressive right scrotal pain.  Patient states that about 1 week ago he noticed a dull, constant pain in his right testicle.  At that time patient also noticed increased urinary frequency, burning with urination, and change in color and smell.  The symptoms progressively got worse over the course of a week and starting on Monday, 3 days prior to arrival, patient noticed worsening swelling to his right testicle with worsening pain.  Patient also has associated fever at that time.  Patient  denies any penile pain or discharge, flank pain, abdominal pain, nausea, vomiting.  Patient with no prior symptoms like this before.  Patient does wake up about 1 time per night to urinate but has no known history of prostate problems.  Patient is sexually active with his spouse and reports not using condoms.  Patient has been taking Tylenol and his home prescribed hydrocodone 7.5 mg for pain.     For the full HPI please refer to the History & Physical from this admission.    Hospital Course By Problem with Pertinent Findings   Santiago Lucia is a 61 y.o. male with a history of hypertension, diabetes, CAD, PVD presenting with fever and right scrotal pain with lab work and imaging concerning for orchitis and UTI, acute kidney injury, and profound leukocytosis. Findings consistent with a diagnosis of right epididymo-orchitis with reactive hydrocele. He started treatment with ceftriaxone and doxycycline on 8/17, improvement in fever curve. U/S scrotum with minimally complex hydrocele on Left testes, no torsion.  LORI improved to baseline after fluids and antibiotics. U/S retroperitoneal no nephrolithiasis, hydronephrosis or solid renal mass. Urology was consulted and evaluated, recommended treating with antibiotics, trending leukocytosis, a scrotal support and followup with them in 2 weeks. The urine culture grew gram - rods, blood culture no growth at 36hours. Converted from IV ceftriaxone to levofloxacin 750 daily. Will followup with urology. Restarted home BP meds day of discharge. Given strict return precautions.    Discharge Medications        Medication List      START taking these medications    levoFLOXacin 750 MG tablet  Commonly known as: LEVAQUIN  Take 1 tablet (750 mg total) by mouth once daily.        CHANGE how you take these medications    diclofenac 75 MG EC tablet  Commonly known as: VOLTAREN  Take 1 tablet (75 mg total) by mouth 2 (two) times daily.  What changed: when to take this     *  HYDROcodone-acetaminophen 7.5-325 mg per tablet  Commonly known as: NORCO  Take 1 tablet by mouth every 12 (twelve) hours as needed for Pain.  What changed: Another medication with the same name was added. Make sure you understand how and when to take each.     * HYDROcodone-acetaminophen 5-325 mg per tablet  Commonly known as: NORCO  Take 1 tablet by mouth every 8 (eight) hours as needed for Pain (testicular pain).  What changed: You were already taking a medication with the same name, and this prescription was added. Make sure you understand how and when to take each.         * This list has 2 medication(s) that are the same as other medications prescribed for you. Read the directions carefully, and ask your doctor or other care provider to review them with you.            CONTINUE taking these medications    ascorbic acid (vitamin C) 500 MG tablet  Commonly known as: VITAMIN C     aspirin 81 MG EC tablet  Commonly known as: ECOTRIN  Take 1 tablet (81 mg total) by mouth once daily.     COLLAGEN MISC     losartan 25 MG tablet  Commonly known as: COZAAR  Take 1 tablet (25 mg total) by mouth once daily.     magnesium oxide 400 mg (241.3 mg magnesium) tablet  Commonly known as: MAG-OX     metoprolol succinate 25 MG 24 hr tablet  Commonly known as: TOPROL-XL  TAKE 1 TABLET(25 MG) BY MOUTH EVERY DAY     rosuvastatin 10 MG tablet  Commonly known as: CRESTOR  TAKE 1 TABLET(10 MG) BY MOUTH EVERY DAY     tadalafiL 20 MG Tab  Commonly known as: CIALIS  Take 1 tablet (20 mg total) by mouth as needed.     vitamin D 1000 units Tab  Commonly known as: VITAMIN D3     XARELTO 2.5 mg Tab  Generic drug: rivaroxaban  Take 1 tablet (2.5 mg total) by mouth 2 (two) times daily with meals.     zinc sulfate 50 mg zinc (220 mg) capsule  Commonly known as: ZINCATE           Where to Get Your Medications      These medications were sent to [facility] [address], [place] [address]    Hours: Mon-Fri, 8a-5:30p  Phone: [phone]   · levoFLOXacin 750 MG tablet     You can get these medications from any pharmacy    Bring a paper prescription for each of these medications  · HYDROcodone-acetaminophen 5-325 mg per tablet         Discharge Information:   Diet:  Regular    Physical Activity:  As tolerated, rest with scrotal support    Instructions:  1. Take all medications as prescribed  2. Keep all follow-up appointments  3. Return to the hospital or call your primary care physicians if any worsening symptoms such as worsening pain, fever, change in urine color or volume occur.      Follow-Up Appointments:  Urology in 2 weeks      Follow up items for PCP and tests that have not resulted at time of discharge:   1. Repeat UA  2. followup GC/CT test      Sommer Flores MD  \[facility] [place] [facility]\"" Internal Medicine, -

## 2022-08-19 NOTE — PLAN OF CARE
"[facility] - Telemetry  Discharge Final Note    Primary Care Provider: Yon Asif MD    Expected Discharge Date: 8/19/2022    Pharmacist will go over home medications and reasons for medications. VN and bedside nurse to reiterate final discharge instructions.     Cleared from CM . Bedside Nurse and VN notified.    Uro recs:  "  Plan:   1. Would ensure WBC down trending, final urine culture resulted prior to discharge  2. Scrotal support  3. Recommend home with fluoroquinolone for 3 weeks  4. Follow up in 2-3 weeks with me     Jeremy Alcala MD  Urology"      Final Discharge Note (most recent)       Final Note - 08/19/22 0952          Final Note    Assessment Type Final Discharge Note (P)      Anticipated Discharge Disposition Home or Self Care (P)      Hospital Resources/Appts/Education Provided Appointments scheduled and added to AVS (P)         Post-Acute Status    Post-Acute Authorization Other (P)      Other Status No Post-Acute Service Needs (P)      Discharge Delays Orders Needed (P)    No other CM needs at this time. CM to sign off.                  Future Appointments   Date Time Provider Department Center   8/22/2022  3:30 PM Yon Asif MD [facility]   8/23/2022  2:30 PM Jackie Calderon DPM [facility]   8/26/2022  9:50 AM Jeremy Alcala MD [facility] UROLOGY [facility]     BP (!) 173/92 (BP Location: Left arm, Patient Position: Lying)   Pulse 91   Temp 98.9 °F (37.2 °C) (Oral)   Resp 18   Ht 6' 3" (1.905 m)   Wt 99.3 kg (218 lb 14.7 oz)   SpO2 98%   BMI 27.36 kg/m²       Contact Info       Yon Asif MD   Specialty: Internal Medicine   Relationship: PCP - General    [address]  [address]  [place] [address]   Phone: [phone]       Next Steps: Go in 1 week(s)    Instructions: FOLLOW UP WITH PCP    [facility]   Specialty: Urology    [address]  [place] [address]   Phone: [phone]       Next Steps: Go in 2 week(s)    " Instructions: UROLOGY APPOINTMENT AFTER DISCHARGE             Medication List      CHANGE how you take these medications    diclofenac 75 MG EC tablet  Commonly known as: VOLTAREN  Take 1 tablet (75 mg total) by mouth 2 (two) times daily.  What changed: when to take this        CONTINUE taking these medications    ascorbic acid (vitamin C) 500 MG tablet  Commonly known as: VITAMIN C     aspirin 81 MG EC tablet  Commonly known as: ECOTRIN  Take 1 tablet (81 mg total) by mouth once daily.     COLLAGEN MISC     HYDROcodone-acetaminophen 7.5-325 mg per tablet  Commonly known as: NORCO  Take 1 tablet by mouth every 12 (twelve) hours as needed for Pain.     losartan 25 MG tablet  Commonly known as: COZAAR  Take 1 tablet (25 mg total) by mouth once daily.     magnesium oxide 400 mg (241.3 mg magnesium) tablet  Commonly known as: MAG-OX     metoprolol succinate 25 MG 24 hr tablet  Commonly known as: TOPROL-XL  TAKE 1 TABLET(25 MG) BY MOUTH EVERY DAY     rosuvastatin 10 MG tablet  Commonly known as: CRESTOR  TAKE 1 TABLET(10 MG) BY MOUTH EVERY DAY     tadalafiL 20 MG Tab  Commonly known as: CIALIS  Take 1 tablet (20 mg total) by mouth as needed.     vitamin D 1000 units Tab  Commonly known as: VITAMIN D3     XARELTO 2.5 mg Tab  Generic drug: rivaroxaban  Take 1 tablet (2.5 mg total) by mouth 2 (two) times daily with meals.     zinc sulfate 50 mg zinc (220 mg) capsule  Commonly known as: ZINCATE

## 2022-08-19 NOTE — PROGRESS NOTES
[facility] Resident Progress Note    Subjective:      This morning Mr. Lucia reports feeling better. No fevers or chills overnight. Pain somewhat improved, swelling improved.      Objective:   Last 24 Hour Vital Signs:  BP  Min: 118/58  Max: 149/79  Temp  Av.4 °F (36.9 °C)  Min: 97.4 °F (36.3 °C)  Max: 99 °F (37.2 °C)  Pulse  Av.2  Min: 84  Max: 102  Resp  Av.7  Min: 16  Max: 18  SpO2  Av %  Min: 97 %  Max: 99 %  I/O last 3 completed shifts:  In: 4431.7 [P.O.:480; IV Piggyback:3951.7]  Out: 2450 [Urine:2450]    Physical Examination:  GEN- AOx3, NAD, laying comfortably in bed  HEENT- PERRL, EOMI, mucous membrane dry  NECK- No thyromegaly or other masses, JVP approximately 5 cm H20  CARDIAC- RRR, no murmurs or rubs  RESP- CTAB, normal work of breathing, no wheezes, crackles, or rhonchi  ABD- Soft, NT, ND, +BS; no masses or HSM  -  Right scrotum is significantly larger than left with firmness and tenderness. No erythema or skin breakdown.  EXT- No clubbing, cyanosis, or edema; 2+ DP/PT pulses  NEURO- CN II-XII grossly intact; moves all four extremities equally  SKIN- Warm and dry; no rashes    Laboratory:  Laboratory Data Reviewed: yes  Pertinent Findings:  Cr 1.2, BUN 22    Microbiology Data Reviewed: yes  Pertinent Findings:  Urine Cx with Gram negative rods  Blood cultures No growth at 24hours    Other Results:  EKG (my interpretation): normal EKG, normal sinus rhythm, unchanged from previous tracings.    Radiology Data Reviewed: yes  Pertinent Findings:  U/S scrotum with minimally complex hydrocele on Left testes, no torsion    U/S retroperitoneal no nephrolithiasis or solid renal mass    Current Medications:     Infusions:       Scheduled:   ascorbic acid (vitamin C)  500 mg Oral Daily    aspirin  81 mg Oral Daily    atorvastatin  40 mg Oral Daily    cefTRIAXone (ROCEPHIN) IVPB  1 g Intravenous Q24H    enoxaparin  40 mg Subcutaneous Daily    magnesium oxide  400 mg Oral Daily     tamsulosin  0.4 mg Oral Daily    vitamin D  1,000 Units Oral Daily    zinc sulfate  220 mg Oral Daily        PRN:  dextrose 10%, dextrose 10%, glucagon (human recombinant), glucose, glucose, HYDROcodone-acetaminophen, ibuprofen, naloxone, sodium chloride 0.9%    Antibiotics and Day Number of Therapy:  Ceftriaxone day 1 8/17  Doxycycline for 1 day    Lines and Day Number of Therapy:  PIV    Assessment:     Santiago Lucia is a 61 y.o.male with a history of hypertension, diabetes, CAD, PVD presenting with fever and right scrotal pain with lab work and imaging concerning for orchitis and UTI, acute kidney injury, and profound leukocytosis. Began on ceftriaxone and doxycycline on 8/17, improvement in fever curve. Urine culture with gram - rods, blood culture no growth at 36hours. Urology evaluated, recommend waiting on cultures to change to an oral antibiotic, testicular elevation.       Plan:   Sepsis secondary to acute orchitis and UTI with profound leukocytosis  On admission Febrile to 103, , WBC count 39 with 15% bands, lactic acid 1.7, since improved with fluids and antibiotics. Urine culture with gram neg rods. US concerning for orchitis. Seen by urology agree with antibiotic treatment.   - Blood cultures NGTD (8/17 1145pm)  - Urine cultures gram - rods  - Continue Rocephin 1g q24 (day 1 8/17)  - G/C amp test collected   - will change to oral antibiotics onve we have sensitivities  - f/u Urology 2-3 weeks     Acute kidney injury - Improved  Improved Cr and BUN  Suspect 2/2 sepsis and UTI     Anion gap metabolic acidosis - Resolved     Diabetes  - Last A1C last year 6.0, now 5.6     Hypertension  INitiailly hypotensive, held home meds. Now high 140s systolic, will restart  - metoprolol succinate 25mg daily  - losartan 25mg daily     Hyperlipidemia  - Continue home rosuvastatin 10 mg     CAD s/p stent 2013  - Continue home asa 81 mg daily  - Continue statin      Peripheral vascular disease  - Continue home  ASA 81 mg  - Patient not taking previously prescribed Xarelto 2.5 mg BID. Will hold for now and have patient follow up with Cardiolgy     Avascular necrosis of the left hip and history of right hip replacement  - Home Hydrocodone ordered PRN  - Vit D, Vit C, and zinc ordered     Diet: Cardiac  DVT ppx: Lovenox  Code: Full  Dispo: possible DC today pending urine cultures    Sommer Flores  [facility] Internal Medicine HO-2  [facility] IM Service Team B    [facility] Medicine Hospitalist Pager numbers:   [facility] Hospitalist Medicine Team A (Daniel/Samanta): [phone]  [facility] Hospitalist Medicine Team B (Tita/Ross):  [phone]

## 2022-08-19 NOTE — PROGRESS NOTES
[facility] - Telemetry  Urology  Progress Note    Patient Name: Santiago Lucia  MRN: [id number]  Admission Date: 8/17/2022  Hospital Length of Stay: 1 days    Subjective:     Interval History: KATIE, still right pain but somewhat improved.  No N/V/F/C.      Objective:     Temp:  [97.4 °F (36.3 °C)-99 °F (37.2 °C)] 98.9 °F (37.2 °C)  Pulse:  [] 91  Resp:  [16-18] 18  SpO2:  [97 %-99 %] 98 %  BP: (118-173)/(58-92) 173/92       NAD  RRR  CTAB  ABD S/ND/NTTP       Penis normal       Yeboah no yeboah  Right firm swollen testicle with reactive hydrocele       Ext: no edema    Significant Labs:  BMP:  Recent Labs   Lab 08/17/22 2318 08/18/22  0415    140   K 4.4 4.2    102   CO2 20* 25   BUN 26* 22   CREATININE 1.3 1.2   CALCIUM 9.2 8.9       CBC:  Recent Labs   Lab 08/17/22 2318 08/18/22  0806   WBC 39.44* 36.22*   HGB 13.7* 12.3*   HCT 42.4 38.4*    224         Urology Specific Assessment:     Problem Noted   Epididymoorchitis 8/18/2022    Right epididymo-orchitis on exam and JOSR, reactive hydrocele  Leukocytosis, fever       Plan:   1. Would ensure WBC down trending, final urine culture resulted prior to discharge  2. Scrotal support  3. Recommend home with fluoroquinolone for 3 weeks  4. Follow up in 2-3 weeks with me    Jeremy Alcala MD  Urology

## 2022-08-19 NOTE — DISCHARGE INSTRUCTIONS
Please continue taking the antibiotic: Levofloxacin 750mg for 21 days   Use tylenol for pain, and Norco as neede for breakthrough worsening pain  The swelling should continue to improve In the coming days, if you have new fever, worsening pain or swelling, call urology or come to the hospital    You need an appointemnt with urology in 2-3 weeks, before you finish the antibiotics, they will help decide next steps.   Please followup with your primary care doctor as well    Take care,  Dr. Sommer Flores

## 2022-08-19 NOTE — PROGRESS NOTES
[facility]                    Pharmacy       Discharge Medication Education    Patient ACCEPTED medication education. Pharmacy has provided education on the name, indication, and possible side effects of the medication(s) prescribed, using teach-back method.     The following medications have also been discussed, during this admission.        Medication List        START taking these medications      levoFLOXacin 750 MG tablet  Commonly known as: LEVAQUIN  Take 1 tablet (750 mg total) by mouth once daily.            CHANGE how you take these medications      diclofenac 75 MG EC tablet  Commonly known as: VOLTAREN  Take 1 tablet (75 mg total) by mouth 2 (two) times daily.  What changed: when to take this     * HYDROcodone-acetaminophen 7.5-325 mg per tablet  Commonly known as: NORCO  Take 1 tablet by mouth every 12 (twelve) hours as needed for Pain.  What changed: Another medication with the same name was added. Make sure you understand how and when to take each.     * HYDROcodone-acetaminophen 5-325 mg per tablet  Commonly known as: NORCO  Take 1 tablet by mouth every 8 (eight) hours as needed for Pain (testicular pain).  What changed: You were already taking a medication with the same name, and this prescription was added. Make sure you understand how and when to take each.           * This list has 2 medication(s) that are the same as other medications prescribed for you. Read the directions carefully, and ask your doctor or other care provider to review them with you.                CONTINUE taking these medications      ascorbic acid (vitamin C) 500 MG tablet  Commonly known as: VITAMIN C     aspirin 81 MG EC tablet  Commonly known as: ECOTRIN  Take 1 tablet (81 mg total) by mouth once daily.     COLLAGEN MISC     losartan 25 MG tablet  Commonly known as: COZAAR  Take 1 tablet (25 mg total) by mouth once daily.     magnesium oxide 400 mg (241.3 mg magnesium) tablet  Commonly known as:  MAG-OX     metoprolol succinate 25 MG 24 hr tablet  Commonly known as: TOPROL-XL  TAKE 1 TABLET(25 MG) BY MOUTH EVERY DAY     rosuvastatin 10 MG tablet  Commonly known as: CRESTOR  TAKE 1 TABLET(10 MG) BY MOUTH EVERY DAY     tadalafiL 20 MG Tab  Commonly known as: CIALIS  Take 1 tablet (20 mg total) by mouth as needed.     vitamin D 1000 units Tab  Commonly known as: VITAMIN D3     XARELTO 2.5 mg Tab  Generic drug: rivaroxaban  Take 1 tablet (2.5 mg total) by mouth 2 (two) times daily with meals.     zinc sulfate 50 mg zinc (220 mg) capsule  Commonly known as: ZINCATE               Where to Get Your Medications        These medications were sent to [facility] [address], [place] [address]      Hours: Mon-Fri, 8a-5:30p Phone: [phone]   levoFLOXacin 750 MG tablet       You can get these medications from any pharmacy    Bring a paper prescription for each of these medications  HYDROcodone-acetaminophen 5-325 mg per tablet          Thank you  Garrett Brown, PharmD  [phone]

## 2022-08-20 LAB
BACTERIA UR CULT: ABNORMAL
C TRACH DNA SPEC QL NAA+PROBE: NOT DETECTED
N GONORRHOEA DNA SPEC QL NAA+PROBE: NOT DETECTED

## 2022-08-22 ENCOUNTER — LAB VISIT (OUTPATIENT)
Dept: LAB | Facility: HOSPITAL | Age: 62
End: 2022-08-22
Attending: INTERNAL MEDICINE
Payer: COMMERCIAL

## 2022-08-22 DIAGNOSIS — N45.3 EPIDIDYMOORCHITIS: ICD-10-CM

## 2022-08-22 LAB
BASOPHILS # BLD AUTO: 0.14 K/UL (ref 0–0.2)
BASOPHILS NFR BLD: 0.6 % (ref 0–1.9)
DIFFERENTIAL METHOD: ABNORMAL
EOSINOPHIL # BLD AUTO: 0.3 K/UL (ref 0–0.5)
EOSINOPHIL NFR BLD: 1.2 % (ref 0–8)
ERYTHROCYTE [DISTWIDTH] IN BLOOD BY AUTOMATED COUNT: 14.1 % (ref 11.5–14.5)
HCT VFR BLD AUTO: 43.9 % (ref 40–54)
HGB BLD-MCNC: 14.4 G/DL (ref 14–18)
IMM GRANULOCYTES # BLD AUTO: 0.36 K/UL (ref 0–0.04)
IMM GRANULOCYTES NFR BLD AUTO: 1.5 % (ref 0–0.5)
LYMPHOCYTES # BLD AUTO: 3.6 K/UL (ref 1–4.8)
LYMPHOCYTES NFR BLD: 15.2 % (ref 18–48)
MCH RBC QN AUTO: 28.6 PG (ref 27–31)
MCHC RBC AUTO-ENTMCNC: 32.8 G/DL (ref 32–36)
MCV RBC AUTO: 87 FL (ref 82–98)
MONOCYTES # BLD AUTO: 1.5 K/UL (ref 0.3–1)
MONOCYTES NFR BLD: 6.4 % (ref 4–15)
NEUTROPHILS # BLD AUTO: 17.7 K/UL (ref 1.8–7.7)
NEUTROPHILS NFR BLD: 75.1 % (ref 38–73)
NRBC BLD-RTO: 0 /100 WBC
PLATELET # BLD AUTO: 346 K/UL (ref 150–450)
PMV BLD AUTO: 9.1 FL (ref 9.2–12.9)
RBC # BLD AUTO: 5.04 M/UL (ref 4.6–6.2)
WBC # BLD AUTO: 23.55 K/UL (ref 3.9–12.7)

## 2022-08-22 PROCEDURE — 36415 COLL VENOUS BLD VENIPUNCTURE: CPT | Performed by: INTERNAL MEDICINE

## 2022-08-22 PROCEDURE — 85025 COMPLETE CBC W/AUTO DIFF WBC: CPT | Performed by: INTERNAL MEDICINE

## 2022-08-23 ENCOUNTER — OFFICE VISIT (OUTPATIENT)
Dept: PODIATRY | Facility: CLINIC | Age: 62
End: 2022-08-23
Payer: COMMERCIAL

## 2022-08-23 ENCOUNTER — PATIENT OUTREACH (OUTPATIENT)
Dept: ADMINISTRATIVE | Facility: CLINIC | Age: 62
End: 2022-08-23
Payer: COMMERCIAL

## 2022-08-23 ENCOUNTER — TELEPHONE (OUTPATIENT)
Dept: PODIATRY | Facility: CLINIC | Age: 62
End: 2022-08-23
Payer: COMMERCIAL

## 2022-08-23 VITALS
SYSTOLIC BLOOD PRESSURE: 159 MMHG | DIASTOLIC BLOOD PRESSURE: 95 MMHG | HEART RATE: 90 BPM | BODY MASS INDEX: 26.61 KG/M2 | WEIGHT: 214 LBS | HEIGHT: 75 IN

## 2022-08-23 DIAGNOSIS — L84 PRE-ULCERATIVE CALLUSES: Primary | ICD-10-CM

## 2022-08-23 DIAGNOSIS — M20.30 HALLUX MALLEUS, UNSPECIFIED LATERALITY: ICD-10-CM

## 2022-08-23 DIAGNOSIS — E11.42 POLYNEUROPATHY DUE TO TYPE 2 DIABETES MELLITUS: ICD-10-CM

## 2022-08-23 LAB
BACTERIA BLD CULT: NORMAL

## 2022-08-23 PROCEDURE — 99999 PR PBB SHADOW E&M-EST. PATIENT-LVL IV: ICD-10-PCS | Mod: PBBFAC,,, | Performed by: STUDENT IN AN ORGANIZED HEALTH CARE EDUCATION/TRAINING PROGRAM

## 2022-08-23 PROCEDURE — 97597 WOUND DEBRIDEMENT: ICD-10-PCS | Mod: S$GLB,,, | Performed by: STUDENT IN AN ORGANIZED HEALTH CARE EDUCATION/TRAINING PROGRAM

## 2022-08-23 PROCEDURE — 3044F PR MOST RECENT HEMOGLOBIN A1C LEVEL <7.0%: ICD-10-PCS | Mod: CPTII,S$GLB,, | Performed by: STUDENT IN AN ORGANIZED HEALTH CARE EDUCATION/TRAINING PROGRAM

## 2022-08-23 PROCEDURE — 3080F DIAST BP >= 90 MM HG: CPT | Mod: CPTII,S$GLB,, | Performed by: STUDENT IN AN ORGANIZED HEALTH CARE EDUCATION/TRAINING PROGRAM

## 2022-08-23 PROCEDURE — 3044F HG A1C LEVEL LT 7.0%: CPT | Mod: CPTII,S$GLB,, | Performed by: STUDENT IN AN ORGANIZED HEALTH CARE EDUCATION/TRAINING PROGRAM

## 2022-08-23 PROCEDURE — 1159F PR MEDICATION LIST DOCUMENTED IN MEDICAL RECORD: ICD-10-PCS | Mod: CPTII,S$GLB,, | Performed by: STUDENT IN AN ORGANIZED HEALTH CARE EDUCATION/TRAINING PROGRAM

## 2022-08-23 PROCEDURE — 3077F PR MOST RECENT SYSTOLIC BLOOD PRESSURE >= 140 MM HG: ICD-10-PCS | Mod: CPTII,S$GLB,, | Performed by: STUDENT IN AN ORGANIZED HEALTH CARE EDUCATION/TRAINING PROGRAM

## 2022-08-23 PROCEDURE — 4010F ACE/ARB THERAPY RXD/TAKEN: CPT | Mod: CPTII,S$GLB,, | Performed by: STUDENT IN AN ORGANIZED HEALTH CARE EDUCATION/TRAINING PROGRAM

## 2022-08-23 PROCEDURE — 99499 UNLISTED E&M SERVICE: CPT | Mod: S$GLB,,, | Performed by: STUDENT IN AN ORGANIZED HEALTH CARE EDUCATION/TRAINING PROGRAM

## 2022-08-23 PROCEDURE — 99999 PR PBB SHADOW E&M-EST. PATIENT-LVL IV: CPT | Mod: PBBFAC,,, | Performed by: STUDENT IN AN ORGANIZED HEALTH CARE EDUCATION/TRAINING PROGRAM

## 2022-08-23 PROCEDURE — 4010F PR ACE/ARB THEARPY RXD/TAKEN: ICD-10-PCS | Mod: CPTII,S$GLB,, | Performed by: STUDENT IN AN ORGANIZED HEALTH CARE EDUCATION/TRAINING PROGRAM

## 2022-08-23 PROCEDURE — 97597 DBRDMT OPN WND 1ST 20 CM/<: CPT | Mod: S$GLB,,, | Performed by: STUDENT IN AN ORGANIZED HEALTH CARE EDUCATION/TRAINING PROGRAM

## 2022-08-23 PROCEDURE — 3008F PR BODY MASS INDEX (BMI) DOCUMENTED: ICD-10-PCS | Mod: CPTII,S$GLB,, | Performed by: STUDENT IN AN ORGANIZED HEALTH CARE EDUCATION/TRAINING PROGRAM

## 2022-08-23 PROCEDURE — 1159F MED LIST DOCD IN RCRD: CPT | Mod: CPTII,S$GLB,, | Performed by: STUDENT IN AN ORGANIZED HEALTH CARE EDUCATION/TRAINING PROGRAM

## 2022-08-23 PROCEDURE — 3008F BODY MASS INDEX DOCD: CPT | Mod: CPTII,S$GLB,, | Performed by: STUDENT IN AN ORGANIZED HEALTH CARE EDUCATION/TRAINING PROGRAM

## 2022-08-23 PROCEDURE — 3080F PR MOST RECENT DIASTOLIC BLOOD PRESSURE >= 90 MM HG: ICD-10-PCS | Mod: CPTII,S$GLB,, | Performed by: STUDENT IN AN ORGANIZED HEALTH CARE EDUCATION/TRAINING PROGRAM

## 2022-08-23 PROCEDURE — 99499 NO LOS: ICD-10-PCS | Mod: S$GLB,,, | Performed by: STUDENT IN AN ORGANIZED HEALTH CARE EDUCATION/TRAINING PROGRAM

## 2022-08-23 PROCEDURE — 3077F SYST BP >= 140 MM HG: CPT | Mod: CPTII,S$GLB,, | Performed by: STUDENT IN AN ORGANIZED HEALTH CARE EDUCATION/TRAINING PROGRAM

## 2022-08-23 NOTE — PROCEDURES
Wound Debridement    Date/Time: 8/23/2022 2:30 PM  Performed by: Jackie Calderon DPM  Authorized by: Jackie Calderon DPM     Consent Done?:  Yes (Verbal)  Local anesthesia used?: No      Type of Debridement:  Non-excisional       Length (cm):  2       Area (sq cm):  2       Width (cm):  1       Percent Debrided (%):  100       Depth (cm):  0       Total Area Debrided (sq cm):  2    Depth of debridement:  Epidermis/Dermis    Tissue debrided:  Epidermis    Devitalized tissue debrided:  Callus    Instruments:  Blade, Curette and Nippers    Bleeding:  None  Patient tolerance:  Patient tolerated the procedure well with no immediate complications     No cultures were taken during this visit, callus debrided and underlying ulcer remains healed

## 2022-08-23 NOTE — TELEPHONE ENCOUNTER
Called and spoke with [facility] to confirm they received supporting documentation and prescription from Dr. Calderon's office. [facility] reports they got the information they needed from Dr. Calderon and that they are waiting on a certified statement from the PCP. No other needs voiced.

## 2022-08-23 NOTE — PROGRESS NOTES
Subjective:      Patient ID: Santiago Lucia is a 61 y.o. male.    Chief Complaint: Follow-up (Follow up routine foot care with PMHx ulceration to right foot)    Santiago is a 61 y.o. male who presents to the clinic for evaluation and treatment of high risk feet. Santiago has a past medical history of Avascular necrosis of bone of hip, left (10/30/2018), Coronary artery disease, DM w/o complication type II (8/9/2013), and NSTEMI (non-ST elevated myocardial infarction) (01/2013). The patient's chief complaint is foot ulcer, right foot. This patient has documented high risk feet requiring routine maintenance secondary to peripheral neuropathy. Recent admission for abscess and recent MRI positive for osteomyelitis. Currently treated with 6 week of antibiotics per Infectious Disease recommendation. Relates to mild pain. No other pedal complaints.     7/6/22: Pt seen today for RFC and pre-ulcerative callus to right great toe. No new pedal complaints. States he is wearing the short boot without issues and using urea cream on callus. [facility] he is working on finding a job where is sitting down to avoid ulcer to his foot.     7/20/22: Seen today for pre-ulcerative callus debridement,  has been filing callus at home. No new pedal complaints    8/3/22: Pt seen today for pre-ulcerative callus debridement. No new pedal complaints. [facility] has not obtained prescription shoes yet, inquring about over the counter recommendations     8/23/22: Pt seen today for pre-ulcerative callus, no new pedal complaints. [facility] wife has been caring for his feet at home. Plan for RFC and annual foot exam during next visit.      PCP: Yon Asif MD    Date Last Seen by PCP: Dr. Velasquez 4/14/22    Current shoe gear:  Per above    History of Trauma: negative  Sign of Infection: none    Hemoglobin A1C   Date Value Ref Range Status   08/18/2022 5.6 4.0 - 5.6 % Final     Comment:     ADA Screening Guidelines:  5.7-6.4%  Consistent with  prediabetes  >or=6.5%  Consistent with diabetes    High levels of fetal hemoglobin interfere with the HbA1C  assay. Heterozygous hemoglobin variants (HbS, HgC, etc)do  not significantly interfere with this assay.   However, presence of multiple variants may affect accuracy.     01/11/2022 6.0 (H) 4.0 - 5.6 % Final     Comment:     ADA Screening Guidelines:  5.7-6.4%  Consistent with prediabetes  >or=6.5%  Consistent with diabetes    High levels of fetal hemoglobin interfere with the HbA1C  assay. Heterozygous hemoglobin variants (HbS, HgC, etc)do  not significantly interfere with this assay.   However, presence of multiple variants may affect accuracy.     07/30/2021 6.0 (H) 4.0 - 5.6 % Final     Comment:     ADA Screening Guidelines:  5.7-6.4%  Consistent with prediabetes  >or=6.5%  Consistent with diabetes    High levels of fetal hemoglobin interfere with the HbA1C  assay. Heterozygous hemoglobin variants (HbS, HgC, etc)do  not significantly interfere with this assay.   However, presence of multiple variants may affect accuracy.         Review of Systems   Constitutional: Negative for chills, decreased appetite, diaphoresis and fever.   HENT: Negative for congestion and hearing loss.    Cardiovascular: Negative for chest pain, claudication, leg swelling and syncope.   Respiratory: Negative for cough and shortness of breath.    Skin: Positive for dry skin, nail changes and poor wound healing. Negative for color change, flushing, itching and rash.   Musculoskeletal: Positive for arthritis, back pain and joint pain. Negative for joint swelling.   Gastrointestinal: Negative for nausea and vomiting.   Neurological: Positive for numbness. Negative for paresthesias.   Psychiatric/Behavioral: Negative for altered mental status. The patient is not nervous/anxious.            Objective:      Physical Exam  Constitutional:       General: He is not in acute distress.     Appearance: Normal appearance. He is well-developed. He  is not diaphoretic.   Cardiovascular:      Comments: Dorsalis pedis and posterior tibial pulses are mildly diminished. Skin temperature is within normal limits. Toes are cool to touch and feet are warm proximally. Hair growth is diminished. Skin is atrophic and with mild hyperpigmentation. No edema noted, bilaterally.   Musculoskeletal:         General: No tenderness.      Comments: Adequate joint range of motion without pain, limitation, nor crepitation to foot and ankle joints. Muscle strength is 5/5 in all groups bilaterally.    Hallux malleus to right great toe   Feet:      Right foot:      Skin integrity: Dry skin present. No ulcer, blister, erythema or warmth.      Left foot:      Skin integrity: Dry skin present. No ulcer, blister, erythema or warmth.   Skin:     General: Skin is warm and dry.      Capillary Refill: Capillary refill takes less than 2 seconds.      Comments: Skin is warm and dry, no acute signs of infection noted bilaterally      Toenails are thickened by 2-4 mm's, dystrophic, and are darkened in coloration with subungual fungal debris.     Pre-ulcerative callus to medial right hallux, upon debridement, site is healed.     Diffuse callus noted to left medial IPJ and MTP of hallux. Diffuse callus noted to left lateral 5th digit    Otherwise, no open wounds, macerations or hyperkeratotic lesions, bilaterally            Neurological:      Mental Status: He is alert and oriented to person, place, and time.      Sensory: Sensory deficit present.      Motor: No abnormal muscle tone.      Comments: Light touch within normal limits.    Psychiatric:         Mood and Affect: Mood normal.         Behavior: Behavior normal.         Thought Content: Thought content normal.       Right great toe ulcer is healed.     Previous Images:      Previous Images:                  Assessment:       Encounter Diagnoses   Name Primary?    Pre-ulcerative calluses Yes    Polyneuropathy due to type 2 diabetes mellitus      Hallux malleus, unspecified laterality          Plan:       Santiago was seen today for follow-up.    Diagnoses and all orders for this visit:    Pre-ulcerative calluses    Polyneuropathy due to type 2 diabetes mellitus    Hallux malleus, unspecified laterality      I counseled the patient on his conditions, their implications and medical management.     -Wound debridement performed, ulcer is healed. Dressed with protective bandage. Recommend short boot for now while ambulating to reduce pressure while walking. Recommend rest and elevation. Continue urea cream 3-4 x per day. Shoe list dispensed.     -Previously rx diabetic shoes.     -Continue follow up with Cardiology, previous TCOMS normal    -Shoe inspection. General Foot Education. Patient instructed on proper foot hygeine. We discussed wearing proper shoe gear, daily foot inspections, never walking without protective shoe gear, never putting sharp instruments to feet. Patient reminded of the importance of good nutrition, weight loss if needed to help alleviate foot pressure/pain     -RTC in 2-4 weeks, sooner PRN

## 2022-08-24 NOTE — PHYSICIAN QUERY
PT Name: Santiago Lucia  MR #: [id number]     DOCUMENTATION CLARIFICATION      CDS: Gaby Serrano RN, CCDS  Contact information: [email or web address]                                        Query withdrawn due to non response by Provider. DARION MIR 8/31/22  This form is a permanent document in the medical record.    Query Date: August 24, 2022    By submitting this query, we are merely seeking further clarification of documentation to reflect the severity of illness of your patient. Please utilize your independent clinical judgment when addressing the question(s) below.     The Medical Record contains the following:   Indicators   Supporting Clinical Findings Location in Medical Record   x Documentation of Sepsis, Septic Shock Sepsis secondary to acute orchitis and UTI with profound leukocytosis  - Febrile to 103, , WBC count 39 with 15% bands, lactic acid 1.7 H&P 8/18    Blood Culture      Respiratory Culture     x Urine Culture ESCHERICHIA COLI   >100,000 cfu/ml  Specimen Source->Urine  Specimen Collected: 08/17/22 22:42   Labs    Other Culture      Acute/Chronic Illness     x Medication/Treatment urine culture grew gram - rods, blood culture no growth at 36hours. Converted from IV ceftriaxone to levofloxacin 750 daily dc summary 8/19    Other        Provider, please further specify the Sepsis diagnosis:     [   ] Due to E. Coli   [   ] Other specification (please specify): ____________________   [   ] Clinically Undetermined     Please document in your progress notes daily for the duration of treatment until resolved, and include in your discharge summary.  Form No. [id number]

## 2022-08-26 ENCOUNTER — OFFICE VISIT (OUTPATIENT)
Dept: UROLOGY | Facility: CLINIC | Age: 62
End: 2022-08-26
Payer: COMMERCIAL

## 2022-08-26 VITALS — BODY MASS INDEX: 26.75 KG/M2 | HEIGHT: 75 IN

## 2022-08-26 DIAGNOSIS — N45.3 EPIDIDYMOORCHITIS: Primary | ICD-10-CM

## 2022-08-26 LAB — POC RESIDUAL URINE VOLUME: 4 ML (ref 0–100)

## 2022-08-26 PROCEDURE — 3008F PR BODY MASS INDEX (BMI) DOCUMENTED: ICD-10-PCS | Mod: CPTII,S$GLB,, | Performed by: UROLOGY

## 2022-08-26 PROCEDURE — 51798 POCT BLADDER SCAN: ICD-10-PCS | Mod: S$GLB,,, | Performed by: UROLOGY

## 2022-08-26 PROCEDURE — 99999 PR PBB SHADOW E&M-EST. PATIENT-LVL III: CPT | Mod: PBBFAC,,, | Performed by: UROLOGY

## 2022-08-26 PROCEDURE — 1111F PR DISCHARGE MEDS RECONCILED W/ CURRENT OUTPATIENT MED LIST: ICD-10-PCS | Mod: CPTII,S$GLB,, | Performed by: UROLOGY

## 2022-08-26 PROCEDURE — 1159F MED LIST DOCD IN RCRD: CPT | Mod: CPTII,S$GLB,, | Performed by: UROLOGY

## 2022-08-26 PROCEDURE — 99213 PR OFFICE/OUTPT VISIT, EST, LEVL III, 20-29 MIN: ICD-10-PCS | Mod: S$GLB,,, | Performed by: UROLOGY

## 2022-08-26 PROCEDURE — 3008F BODY MASS INDEX DOCD: CPT | Mod: CPTII,S$GLB,, | Performed by: UROLOGY

## 2022-08-26 PROCEDURE — 99213 OFFICE O/P EST LOW 20 MIN: CPT | Mod: S$GLB,,, | Performed by: UROLOGY

## 2022-08-26 PROCEDURE — 3044F PR MOST RECENT HEMOGLOBIN A1C LEVEL <7.0%: ICD-10-PCS | Mod: CPTII,S$GLB,, | Performed by: UROLOGY

## 2022-08-26 PROCEDURE — 1159F PR MEDICATION LIST DOCUMENTED IN MEDICAL RECORD: ICD-10-PCS | Mod: CPTII,S$GLB,, | Performed by: UROLOGY

## 2022-08-26 PROCEDURE — 99999 PR PBB SHADOW E&M-EST. PATIENT-LVL III: ICD-10-PCS | Mod: PBBFAC,,, | Performed by: UROLOGY

## 2022-08-26 PROCEDURE — 51798 US URINE CAPACITY MEASURE: CPT | Mod: S$GLB,,, | Performed by: UROLOGY

## 2022-08-26 PROCEDURE — 3044F HG A1C LEVEL LT 7.0%: CPT | Mod: CPTII,S$GLB,, | Performed by: UROLOGY

## 2022-08-26 PROCEDURE — 4010F PR ACE/ARB THEARPY RXD/TAKEN: ICD-10-PCS | Mod: CPTII,S$GLB,, | Performed by: UROLOGY

## 2022-08-26 PROCEDURE — 1111F DSCHRG MED/CURRENT MED MERGE: CPT | Mod: CPTII,S$GLB,, | Performed by: UROLOGY

## 2022-08-26 PROCEDURE — 4010F ACE/ARB THERAPY RXD/TAKEN: CPT | Mod: CPTII,S$GLB,, | Performed by: UROLOGY

## 2022-08-26 NOTE — PROGRESS NOTES
Subjective:       Patient ID: Santiago Lucia is a 61 y.o. male.    Chief Complaint: Hospital Follow Up and Urinary Tract Infection     This is a 61 y.o.  male patient that is an established patient of mine.  Hospitalized 8/2022 due to severe epididymo-orchitis on right and fever, WBC elevation.  Transitioned to PO antibiotic and continues on Levaquin.  Urine culture E Coli at time.  GUDELIA w/o hydro/stones.      8/26/22: improved swelling, minimal discomfort, no fever or chills.      I personally reviewed the images: JOSR 8/2022    LAST PSA  Lab Results   Component Value Date    PSA 3.2 10/05/2021       Lab Results   Component Value Date    CREATININE 1.2 08/18/2022       ---  PMH/PSH/Medications/Allergies/Social history reviewed and as in chart.    Review of Systems   Constitutional: Negative for activity change, chills and fever.   HENT: Negative for congestion.    Respiratory: Negative for cough, chest tightness and shortness of breath.    Cardiovascular: Negative for chest pain and palpitations.   Gastrointestinal: Negative for abdominal distention, abdominal pain, nausea and vomiting.   Genitourinary: Positive for scrotal swelling and testicular pain. Negative for difficulty urinating, flank pain, hematuria and penile pain.   Musculoskeletal: Negative for gait problem.       Objective:      Physical Exam  Constitutional:       Appearance: Normal appearance.   HENT:      Head: Normocephalic.   Pulmonary:      Effort: Pulmonary effort is normal.      Breath sounds: Normal breath sounds.   Abdominal:      General: Abdomen is flat. Bowel sounds are normal.      Palpations: Abdomen is soft.      Tenderness: There is no abdominal tenderness. There is no right CVA tenderness, left CVA tenderness or guarding.   Genitourinary:     Comments: Improved right reactive hydrocele, minimal discomfort.  No abscess  Musculoskeletal:         General: Normal range of motion.      Cervical back: Normal range of motion.   Skin:      General: Skin is warm.   Neurological:      Mental Status: He is alert.         Assessment:     Problem Noted   Epididymoorchitis 8/18/2022    Right epididymo-orchitis on exam and JOSR, reactive hydrocele  Leukocytosis, fever         Plan:     1. Continue abx  2.  Ok to work  3. Follow up in 3 months    Jeremy Alcala MD

## 2022-11-01 ENCOUNTER — OFFICE VISIT (OUTPATIENT)
Dept: PODIATRY | Facility: CLINIC | Age: 62
End: 2022-11-01
Payer: COMMERCIAL

## 2022-11-01 VITALS
BODY MASS INDEX: 26.75 KG/M2 | HEART RATE: 81 BPM | HEIGHT: 75 IN | SYSTOLIC BLOOD PRESSURE: 130 MMHG | DIASTOLIC BLOOD PRESSURE: 81 MMHG

## 2022-11-01 DIAGNOSIS — L84 PRE-ULCERATIVE CALLUSES: Primary | ICD-10-CM

## 2022-11-01 DIAGNOSIS — E11.9 ENCOUNTER FOR DIABETIC FOOT EXAM: ICD-10-CM

## 2022-11-01 DIAGNOSIS — E11.51 TYPE II DIABETES MELLITUS WITH PERIPHERAL CIRCULATORY DISORDER: ICD-10-CM

## 2022-11-01 DIAGNOSIS — B35.1 ONYCHOMYCOSIS: ICD-10-CM

## 2022-11-01 DIAGNOSIS — E11.42 POLYNEUROPATHY DUE TO TYPE 2 DIABETES MELLITUS: ICD-10-CM

## 2022-11-01 PROCEDURE — 3044F PR MOST RECENT HEMOGLOBIN A1C LEVEL <7.0%: ICD-10-PCS | Mod: CPTII,S$GLB,, | Performed by: STUDENT IN AN ORGANIZED HEALTH CARE EDUCATION/TRAINING PROGRAM

## 2022-11-01 PROCEDURE — 99999 PR PBB SHADOW E&M-EST. PATIENT-LVL III: CPT | Mod: PBBFAC,,, | Performed by: STUDENT IN AN ORGANIZED HEALTH CARE EDUCATION/TRAINING PROGRAM

## 2022-11-01 PROCEDURE — 11056 PARNG/CUTG B9 HYPRKR LES 2-4: CPT | Mod: S$GLB,,, | Performed by: STUDENT IN AN ORGANIZED HEALTH CARE EDUCATION/TRAINING PROGRAM

## 2022-11-01 PROCEDURE — 1159F MED LIST DOCD IN RCRD: CPT | Mod: CPTII,S$GLB,, | Performed by: STUDENT IN AN ORGANIZED HEALTH CARE EDUCATION/TRAINING PROGRAM

## 2022-11-01 PROCEDURE — 11721 ROUTINE FOOT CARE: ICD-10-PCS | Mod: 59,S$GLB,, | Performed by: STUDENT IN AN ORGANIZED HEALTH CARE EDUCATION/TRAINING PROGRAM

## 2022-11-01 PROCEDURE — 99999 PR PBB SHADOW E&M-EST. PATIENT-LVL III: ICD-10-PCS | Mod: PBBFAC,,, | Performed by: STUDENT IN AN ORGANIZED HEALTH CARE EDUCATION/TRAINING PROGRAM

## 2022-11-01 PROCEDURE — 99214 OFFICE O/P EST MOD 30 MIN: CPT | Mod: 25,S$GLB,, | Performed by: STUDENT IN AN ORGANIZED HEALTH CARE EDUCATION/TRAINING PROGRAM

## 2022-11-01 PROCEDURE — 3079F DIAST BP 80-89 MM HG: CPT | Mod: CPTII,S$GLB,, | Performed by: STUDENT IN AN ORGANIZED HEALTH CARE EDUCATION/TRAINING PROGRAM

## 2022-11-01 PROCEDURE — 11721 DEBRIDE NAIL 6 OR MORE: CPT | Mod: 59,S$GLB,, | Performed by: STUDENT IN AN ORGANIZED HEALTH CARE EDUCATION/TRAINING PROGRAM

## 2022-11-01 PROCEDURE — 3044F HG A1C LEVEL LT 7.0%: CPT | Mod: CPTII,S$GLB,, | Performed by: STUDENT IN AN ORGANIZED HEALTH CARE EDUCATION/TRAINING PROGRAM

## 2022-11-01 PROCEDURE — 11056 ROUTINE FOOT CARE: ICD-10-PCS | Mod: S$GLB,,, | Performed by: STUDENT IN AN ORGANIZED HEALTH CARE EDUCATION/TRAINING PROGRAM

## 2022-11-01 PROCEDURE — 3075F PR MOST RECENT SYSTOLIC BLOOD PRESS GE 130-139MM HG: ICD-10-PCS | Mod: CPTII,S$GLB,, | Performed by: STUDENT IN AN ORGANIZED HEALTH CARE EDUCATION/TRAINING PROGRAM

## 2022-11-01 PROCEDURE — 4010F PR ACE/ARB THEARPY RXD/TAKEN: ICD-10-PCS | Mod: CPTII,S$GLB,, | Performed by: STUDENT IN AN ORGANIZED HEALTH CARE EDUCATION/TRAINING PROGRAM

## 2022-11-01 PROCEDURE — 3008F PR BODY MASS INDEX (BMI) DOCUMENTED: ICD-10-PCS | Mod: CPTII,S$GLB,, | Performed by: STUDENT IN AN ORGANIZED HEALTH CARE EDUCATION/TRAINING PROGRAM

## 2022-11-01 PROCEDURE — 1159F PR MEDICATION LIST DOCUMENTED IN MEDICAL RECORD: ICD-10-PCS | Mod: CPTII,S$GLB,, | Performed by: STUDENT IN AN ORGANIZED HEALTH CARE EDUCATION/TRAINING PROGRAM

## 2022-11-01 PROCEDURE — 3075F SYST BP GE 130 - 139MM HG: CPT | Mod: CPTII,S$GLB,, | Performed by: STUDENT IN AN ORGANIZED HEALTH CARE EDUCATION/TRAINING PROGRAM

## 2022-11-01 PROCEDURE — 99214 PR OFFICE/OUTPT VISIT, EST, LEVL IV, 30-39 MIN: ICD-10-PCS | Mod: 25,S$GLB,, | Performed by: STUDENT IN AN ORGANIZED HEALTH CARE EDUCATION/TRAINING PROGRAM

## 2022-11-01 PROCEDURE — 3008F BODY MASS INDEX DOCD: CPT | Mod: CPTII,S$GLB,, | Performed by: STUDENT IN AN ORGANIZED HEALTH CARE EDUCATION/TRAINING PROGRAM

## 2022-11-01 PROCEDURE — 4010F ACE/ARB THERAPY RXD/TAKEN: CPT | Mod: CPTII,S$GLB,, | Performed by: STUDENT IN AN ORGANIZED HEALTH CARE EDUCATION/TRAINING PROGRAM

## 2022-11-01 PROCEDURE — 3079F PR MOST RECENT DIASTOLIC BLOOD PRESSURE 80-89 MM HG: ICD-10-PCS | Mod: CPTII,S$GLB,, | Performed by: STUDENT IN AN ORGANIZED HEALTH CARE EDUCATION/TRAINING PROGRAM

## 2022-11-01 NOTE — PROCEDURES
"Routine Foot Care    Date/Time: 11/1/2022 3:00 PM  Performed by: Jackie Calderon DPM  Authorized by: Jackie Calderon DPM     Time out: Immediately prior to procedure a "time out" was called to verify the correct patient, procedure, equipment, support staff and site/side marked as required.    Consent Done?:  Yes (Verbal)  Hyperkeratotic Skin Lesions?: Yes    Number of trimmed lesions:  4  Location(s):  Left 1st Toe, Right 1st Toe, Left 5th Toe and Right 5th Toe    Nail Care Type:  Debride  Location(s): All  (Left 1st Toe, Left 3rd Toe, Left 2nd Toe, Left 4th Toe, Left 5th Toe, Right 1st Toe, Right 2nd Toe, Right 3rd Toe, Right 4th Toe and Right 5th Toe)  Patient tolerance:  Patient tolerated the procedure well with no immediate complications  "

## 2022-11-01 NOTE — PROGRESS NOTES
Subjective:      Patient ID: Santiago Lucia is a 61 y.o. male.    Chief Complaint: Follow-up and Wound Check    Santiago is a 61 y.o. male who presents to the clinic for evaluation and treatment of high risk feet. Santiago has a past medical history of Avascular necrosis of bone of hip, left (10/30/2018), Coronary artery disease, DM w/o complication type II (8/9/2013), and NSTEMI (non-ST elevated myocardial infarction) (01/2013). The patient's chief complaint is foot ulcer, right foot. This patient has documented high risk feet requiring routine maintenance secondary to peripheral neuropathy. Recent admission for abscess and recent MRI positive for osteomyelitis. Currently treated with 6 week of antibiotics per Infectious Disease recommendation. Relates to mild pain. No other pedal complaints.  7/6/22: Pt seen today for RFC and pre-ulcerative callus to right great toe. No new pedal complaints. States he is wearing the short boot without issues and using urea cream on callus. States he is working on finding a job where is sitting down to avoid ulcer to his foot.     11/1/22: Pt seen today for annual foot exam and routine foot care and bilateral great toe, pre-ulcerative callus. No new pedal complaints.     PCP: Yon Asif MD    Date Last Seen by PCP: Dr. Velasquez 4/14/22    Current shoe gear:  Per above    History of Trauma: negative  Sign of Infection: none    Hemoglobin A1C   Date Value Ref Range Status   08/18/2022 5.6 4.0 - 5.6 % Final     Comment:     ADA Screening Guidelines:  5.7-6.4%  Consistent with prediabetes  >or=6.5%  Consistent with diabetes    High levels of fetal hemoglobin interfere with the HbA1C  assay. Heterozygous hemoglobin variants (HbS, HgC, etc)do  not significantly interfere with this assay.   However, presence of multiple variants may affect accuracy.     01/11/2022 6.0 (H) 4.0 - 5.6 % Final     Comment:     ADA Screening Guidelines:  5.7-6.4%  Consistent with prediabetes  >or=6.5%   Consistent with diabetes    High levels of fetal hemoglobin interfere with the HbA1C  assay. Heterozygous hemoglobin variants (HbS, HgC, etc)do  not significantly interfere with this assay.   However, presence of multiple variants may affect accuracy.     07/30/2021 6.0 (H) 4.0 - 5.6 % Final     Comment:     ADA Screening Guidelines:  5.7-6.4%  Consistent with prediabetes  >or=6.5%  Consistent with diabetes    High levels of fetal hemoglobin interfere with the HbA1C  assay. Heterozygous hemoglobin variants (HbS, HgC, etc)do  not significantly interfere with this assay.   However, presence of multiple variants may affect accuracy.         Review of Systems   Constitutional: Negative for chills, decreased appetite, diaphoresis and fever.   HENT:  Negative for congestion and hearing loss.    Cardiovascular:  Negative for chest pain, claudication, leg swelling and syncope.   Respiratory:  Negative for cough and shortness of breath.    Skin:  Positive for dry skin, nail changes and poor wound healing. Negative for color change, flushing, itching and rash.   Musculoskeletal:  Positive for arthritis, back pain and joint pain. Negative for joint swelling.   Gastrointestinal:  Negative for nausea and vomiting.   Neurological:  Positive for numbness. Negative for paresthesias.   Psychiatric/Behavioral:  Negative for altered mental status. The patient is not nervous/anxious.          Objective:      Physical Exam  Constitutional:       General: He is not in acute distress.     Appearance: Normal appearance. He is well-developed. He is not diaphoretic.   Cardiovascular:      Comments: Dorsalis pedis and posterior tibial pulses are mildly diminished. Skin temperature is within normal limits. Toes are cool to touch and feet are warm proximally. Hair growth is diminished. Skin is atrophic and with mild hyperpigmentation. No edema noted, bilaterally.   Musculoskeletal:         General: No tenderness.      Comments: Adequate joint  range of motion without pain, limitation, nor crepitation to foot and ankle joints. Muscle strength is 5/5 in all groups bilaterally.    Hallux malleus to right great toe   Feet:      Right foot:      Skin integrity: Dry skin present. No ulcer, blister, erythema or warmth.      Left foot:      Skin integrity: Dry skin present. No ulcer, blister, erythema or warmth.   Skin:     General: Skin is warm and dry.      Capillary Refill: Capillary refill takes less than 2 seconds.      Comments: Skin is warm and dry, no acute signs of infection noted bilaterally      Toenails are thickened by 2-4 mm's, dystrophic, and are darkened in coloration with subungual fungal debris.     Pre-ulcerative callus to medial bilateral hallux, upon debridement, site is healed.     Diffuse callus noted to bilateral hallux and 5th digit    Otherwise, no open wounds, macerations or hyperkeratotic lesions, bilaterally            Neurological:      Mental Status: He is alert and oriented to person, place, and time.      Sensory: Sensory deficit present.      Motor: No abnormal muscle tone.      Comments: Light touch within normal limits.    Psychiatric:         Mood and Affect: Mood normal.         Behavior: Behavior normal.         Thought Content: Thought content normal.     Right great toe ulcer is healed.     Previous Images:      Previous Images:                  Assessment:       Encounter Diagnoses   Name Primary?    Pre-ulcerative calluses Yes    Polyneuropathy due to type 2 diabetes mellitus     Type II diabetes mellitus with peripheral circulatory disorder     Onychomycosis     Encounter for diabetic foot exam            Plan:       Santiago was seen today for follow-up and wound check.    Diagnoses and all orders for this visit:    Pre-ulcerative calluses  -     Routine Foot Care    Polyneuropathy due to type 2 diabetes mellitus  -     Routine Foot Care    Type II diabetes mellitus with peripheral circulatory disorder  -     Routine  Foot Care    Onychomycosis  -     Routine Foot Care    Encounter for diabetic foot exam  -     Routine Foot Care      I counseled the patient on his conditions, their implications and medical management.     -Routine foot care per attached note, ulcer remains healed    -Previously rx diabetic shoes.     -Continue follow up with Cardiology, previous TCOMS normal    -Shoe inspection. Diabetic Foot Education. Patient reminded of the importance of good nutrition and blood sugar control to help prevent podiatric complications of diabetes. Patient instructed on proper foot hygeine. We discussed wearing proper shoe gear, daily foot inspections, never walking without protective shoe gear, never putting sharp instruments to feet, routine podiatric nail visits      -RTC in 2-4 weeks, sooner PRN

## 2022-11-21 PROBLEM — N39.0 UTI (URINARY TRACT INFECTION): Status: RESOLVED | Noted: 2022-08-18 | Resolved: 2022-11-21

## 2022-11-29 ENCOUNTER — OFFICE VISIT (OUTPATIENT)
Dept: UROLOGY | Facility: CLINIC | Age: 62
End: 2022-11-29
Payer: COMMERCIAL

## 2022-11-29 VITALS
HEART RATE: 82 BPM | SYSTOLIC BLOOD PRESSURE: 120 MMHG | DIASTOLIC BLOOD PRESSURE: 79 MMHG | WEIGHT: 220.13 LBS | BODY MASS INDEX: 27.37 KG/M2 | HEIGHT: 75 IN

## 2022-11-29 DIAGNOSIS — N45.3 EPIDIDYMOORCHITIS: ICD-10-CM

## 2022-11-29 PROCEDURE — 3074F SYST BP LT 130 MM HG: CPT | Mod: CPTII,S$GLB,, | Performed by: UROLOGY

## 2022-11-29 PROCEDURE — 1160F PR REVIEW ALL MEDS BY PRESCRIBER/CLIN PHARMACIST DOCUMENTED: ICD-10-PCS | Mod: CPTII,S$GLB,, | Performed by: UROLOGY

## 2022-11-29 PROCEDURE — 99999 PR PBB SHADOW E&M-EST. PATIENT-LVL IV: CPT | Mod: PBBFAC,,, | Performed by: UROLOGY

## 2022-11-29 PROCEDURE — 3008F BODY MASS INDEX DOCD: CPT | Mod: CPTII,S$GLB,, | Performed by: UROLOGY

## 2022-11-29 PROCEDURE — 3078F PR MOST RECENT DIASTOLIC BLOOD PRESSURE < 80 MM HG: ICD-10-PCS | Mod: CPTII,S$GLB,, | Performed by: UROLOGY

## 2022-11-29 PROCEDURE — 99213 PR OFFICE/OUTPT VISIT, EST, LEVL III, 20-29 MIN: ICD-10-PCS | Mod: S$GLB,,, | Performed by: UROLOGY

## 2022-11-29 PROCEDURE — 1159F PR MEDICATION LIST DOCUMENTED IN MEDICAL RECORD: ICD-10-PCS | Mod: CPTII,S$GLB,, | Performed by: UROLOGY

## 2022-11-29 PROCEDURE — 99999 PR PBB SHADOW E&M-EST. PATIENT-LVL IV: ICD-10-PCS | Mod: PBBFAC,,, | Performed by: UROLOGY

## 2022-11-29 PROCEDURE — 3078F DIAST BP <80 MM HG: CPT | Mod: CPTII,S$GLB,, | Performed by: UROLOGY

## 2022-11-29 PROCEDURE — 1160F RVW MEDS BY RX/DR IN RCRD: CPT | Mod: CPTII,S$GLB,, | Performed by: UROLOGY

## 2022-11-29 PROCEDURE — 3044F HG A1C LEVEL LT 7.0%: CPT | Mod: CPTII,S$GLB,, | Performed by: UROLOGY

## 2022-11-29 PROCEDURE — 99213 OFFICE O/P EST LOW 20 MIN: CPT | Mod: S$GLB,,, | Performed by: UROLOGY

## 2022-11-29 PROCEDURE — 3074F PR MOST RECENT SYSTOLIC BLOOD PRESSURE < 130 MM HG: ICD-10-PCS | Mod: CPTII,S$GLB,, | Performed by: UROLOGY

## 2022-11-29 PROCEDURE — 4010F ACE/ARB THERAPY RXD/TAKEN: CPT | Mod: CPTII,S$GLB,, | Performed by: UROLOGY

## 2022-11-29 PROCEDURE — 1159F MED LIST DOCD IN RCRD: CPT | Mod: CPTII,S$GLB,, | Performed by: UROLOGY

## 2022-11-29 PROCEDURE — 4010F PR ACE/ARB THEARPY RXD/TAKEN: ICD-10-PCS | Mod: CPTII,S$GLB,, | Performed by: UROLOGY

## 2022-11-29 PROCEDURE — 3044F PR MOST RECENT HEMOGLOBIN A1C LEVEL <7.0%: ICD-10-PCS | Mod: CPTII,S$GLB,, | Performed by: UROLOGY

## 2022-11-29 PROCEDURE — 3008F PR BODY MASS INDEX (BMI) DOCUMENTED: ICD-10-PCS | Mod: CPTII,S$GLB,, | Performed by: UROLOGY

## 2022-11-29 NOTE — PROGRESS NOTES
Subjective:       Patient ID: Santiago Lucia is a 61 y.o. male.    Chief Complaint: Follow-up (3 month)     This is a 61 y.o.  male patient that is an established patient of mine.  Hospitalized 8/2022 due to severe epididymo-orchitis on right and fever, WBC elevation.  Transitioned to PO antibiotic and continues on Levaquin.  Urine culture E Coli at time.  GUDELIA w/o hydro/stones.    Resolved swelling and discomfort.     I personally reviewed the images: JOSR 8/2022    LAST PSA  Lab Results   Component Value Date    PSA 3.2 10/05/2021       Lab Results   Component Value Date    CREATININE 1.2 08/18/2022       ---  PMH/PSH/Medications/Allergies/Social history reviewed and as in chart.    Review of Systems   Constitutional:  Negative for activity change, chills and fever.   HENT:  Negative for congestion.    Respiratory:  Negative for cough, chest tightness and shortness of breath.    Cardiovascular:  Negative for chest pain and palpitations.   Gastrointestinal:  Negative for abdominal distention, abdominal pain, nausea and vomiting.   Genitourinary:  Negative for difficulty urinating, flank pain, hematuria, penile pain, scrotal swelling and testicular pain.   Musculoskeletal:  Negative for gait problem.     Objective:      Physical Exam  Constitutional:       Appearance: Normal appearance.   HENT:      Head: Normocephalic.   Pulmonary:      Effort: Pulmonary effort is normal.      Breath sounds: Normal breath sounds.   Abdominal:      General: Abdomen is flat. Bowel sounds are normal.      Palpations: Abdomen is soft.      Tenderness: There is no abdominal tenderness. There is no right CVA tenderness, left CVA tenderness or guarding.   Genitourinary:     Comments: Improved right reactive hydrocele, minimal discomfort.  No abscess  Musculoskeletal:         General: Normal range of motion.      Cervical back: Normal range of motion.   Skin:     General: Skin is warm.   Neurological:      Mental Status: He is alert.        Assessment:     Problem Noted   Epididymoorchitis (Resolved) 8/18/2022    Right epididymo-orchitis on exam and JOSR, reactive hydrocele  Resolved         Plan:     Resolved epididymo-orchitis  2.  Follow up PRN     Jeremy Alcala MD

## 2023-01-10 ENCOUNTER — OFFICE VISIT (OUTPATIENT)
Dept: PODIATRY | Facility: CLINIC | Age: 63
End: 2023-01-10
Payer: COMMERCIAL

## 2023-01-10 VITALS
SYSTOLIC BLOOD PRESSURE: 139 MMHG | HEART RATE: 73 BPM | BODY MASS INDEX: 27.51 KG/M2 | HEIGHT: 75 IN | DIASTOLIC BLOOD PRESSURE: 85 MMHG

## 2023-01-10 DIAGNOSIS — L84 PRE-ULCERATIVE CALLUSES: Primary | ICD-10-CM

## 2023-01-10 DIAGNOSIS — E11.51 TYPE II DIABETES MELLITUS WITH PERIPHERAL CIRCULATORY DISORDER: ICD-10-CM

## 2023-01-10 DIAGNOSIS — E11.42 POLYNEUROPATHY DUE TO TYPE 2 DIABETES MELLITUS: ICD-10-CM

## 2023-01-10 DIAGNOSIS — B35.1 ONYCHOMYCOSIS: ICD-10-CM

## 2023-01-10 DIAGNOSIS — L84 CORN OR CALLUS: ICD-10-CM

## 2023-01-10 PROCEDURE — 11721 ROUTINE FOOT CARE: ICD-10-PCS | Mod: 59,S$GLB,, | Performed by: STUDENT IN AN ORGANIZED HEALTH CARE EDUCATION/TRAINING PROGRAM

## 2023-01-10 PROCEDURE — 3008F PR BODY MASS INDEX (BMI) DOCUMENTED: ICD-10-PCS | Mod: CPTII,S$GLB,, | Performed by: STUDENT IN AN ORGANIZED HEALTH CARE EDUCATION/TRAINING PROGRAM

## 2023-01-10 PROCEDURE — 99999 PR PBB SHADOW E&M-EST. PATIENT-LVL III: CPT | Mod: PBBFAC,,, | Performed by: STUDENT IN AN ORGANIZED HEALTH CARE EDUCATION/TRAINING PROGRAM

## 2023-01-10 PROCEDURE — 1159F PR MEDICATION LIST DOCUMENTED IN MEDICAL RECORD: ICD-10-PCS | Mod: CPTII,S$GLB,, | Performed by: STUDENT IN AN ORGANIZED HEALTH CARE EDUCATION/TRAINING PROGRAM

## 2023-01-10 PROCEDURE — 11056 PARNG/CUTG B9 HYPRKR LES 2-4: CPT | Mod: S$GLB,,, | Performed by: STUDENT IN AN ORGANIZED HEALTH CARE EDUCATION/TRAINING PROGRAM

## 2023-01-10 PROCEDURE — 3008F BODY MASS INDEX DOCD: CPT | Mod: CPTII,S$GLB,, | Performed by: STUDENT IN AN ORGANIZED HEALTH CARE EDUCATION/TRAINING PROGRAM

## 2023-01-10 PROCEDURE — 11056 ROUTINE FOOT CARE: ICD-10-PCS | Mod: S$GLB,,, | Performed by: STUDENT IN AN ORGANIZED HEALTH CARE EDUCATION/TRAINING PROGRAM

## 2023-01-10 PROCEDURE — 3079F DIAST BP 80-89 MM HG: CPT | Mod: CPTII,S$GLB,, | Performed by: STUDENT IN AN ORGANIZED HEALTH CARE EDUCATION/TRAINING PROGRAM

## 2023-01-10 PROCEDURE — 3079F PR MOST RECENT DIASTOLIC BLOOD PRESSURE 80-89 MM HG: ICD-10-PCS | Mod: CPTII,S$GLB,, | Performed by: STUDENT IN AN ORGANIZED HEALTH CARE EDUCATION/TRAINING PROGRAM

## 2023-01-10 PROCEDURE — 99499 NO LOS: ICD-10-PCS | Mod: S$GLB,,, | Performed by: STUDENT IN AN ORGANIZED HEALTH CARE EDUCATION/TRAINING PROGRAM

## 2023-01-10 PROCEDURE — 3075F SYST BP GE 130 - 139MM HG: CPT | Mod: CPTII,S$GLB,, | Performed by: STUDENT IN AN ORGANIZED HEALTH CARE EDUCATION/TRAINING PROGRAM

## 2023-01-10 PROCEDURE — 99999 PR PBB SHADOW E&M-EST. PATIENT-LVL III: ICD-10-PCS | Mod: PBBFAC,,, | Performed by: STUDENT IN AN ORGANIZED HEALTH CARE EDUCATION/TRAINING PROGRAM

## 2023-01-10 PROCEDURE — 3075F PR MOST RECENT SYSTOLIC BLOOD PRESS GE 130-139MM HG: ICD-10-PCS | Mod: CPTII,S$GLB,, | Performed by: STUDENT IN AN ORGANIZED HEALTH CARE EDUCATION/TRAINING PROGRAM

## 2023-01-10 PROCEDURE — 11721 DEBRIDE NAIL 6 OR MORE: CPT | Mod: 59,S$GLB,, | Performed by: STUDENT IN AN ORGANIZED HEALTH CARE EDUCATION/TRAINING PROGRAM

## 2023-01-10 PROCEDURE — 1159F MED LIST DOCD IN RCRD: CPT | Mod: CPTII,S$GLB,, | Performed by: STUDENT IN AN ORGANIZED HEALTH CARE EDUCATION/TRAINING PROGRAM

## 2023-01-10 PROCEDURE — 97597 WOUND DEBRIDEMENT: ICD-10-PCS | Mod: 59,S$GLB,, | Performed by: STUDENT IN AN ORGANIZED HEALTH CARE EDUCATION/TRAINING PROGRAM

## 2023-01-10 PROCEDURE — 97597 DBRDMT OPN WND 1ST 20 CM/<: CPT | Mod: 59,S$GLB,, | Performed by: STUDENT IN AN ORGANIZED HEALTH CARE EDUCATION/TRAINING PROGRAM

## 2023-01-10 PROCEDURE — 99499 UNLISTED E&M SERVICE: CPT | Mod: S$GLB,,, | Performed by: STUDENT IN AN ORGANIZED HEALTH CARE EDUCATION/TRAINING PROGRAM

## 2023-01-10 NOTE — PROGRESS NOTES
Subjective:      Patient ID: Santiago Lucia is a 62 y.o. male.    Chief Complaint: Follow-up and Wound Check      Santiago is a 62 y.o. male who presents to the clinic for evaluation and treatment of high risk feet. Santiago has a past medical history of Avascular necrosis of bone of hip, left (10/30/2018), Coronary artery disease, DM w/o complication type II (8/9/2013), and NSTEMI (non-ST elevated myocardial infarction) (01/2013). The patient's chief complaint is foot ulcer, right foot. This patient has documented high risk feet requiring routine maintenance secondary to peripheral neuropathy. Recent admission for abscess and recent MRI positive for osteomyelitis. Currently treated with 6 week of antibiotics per Infectious Disease recommendation. Relates to mild pain. No other pedal complaints.  7/6/22: Pt seen today for RFC and pre-ulcerative callus to right great toe. No new pedal complaints. States he is wearing the short boot without issues and using urea cream on callus. States he is working on finding a job where is sitting down to avoid ulcer to his foot.     11/1/22: Pt seen today for annual foot exam and routine foot care and bilateral great toe, pre-ulcerative callus. No new pedal complaints.     1/10/23: Seen today for routine foot care and pre-ulcerative callus of right hallux. No new pedal complaints.     PCP: Yon Asif MD    Date Last Seen by PCP: Dr. Velasquez 4/14/22    Current shoe gear:  Per above    History of Trauma: negative  Sign of Infection: none    Hemoglobin A1C   Date Value Ref Range Status   08/18/2022 5.6 4.0 - 5.6 % Final     Comment:     ADA Screening Guidelines:  5.7-6.4%  Consistent with prediabetes  >or=6.5%  Consistent with diabetes    High levels of fetal hemoglobin interfere with the HbA1C  assay. Heterozygous hemoglobin variants (HbS, HgC, etc)do  not significantly interfere with this assay.   However, presence of multiple variants may affect accuracy.     01/11/2022 6.0 (H)  4.0 - 5.6 % Final     Comment:     ADA Screening Guidelines:  5.7-6.4%  Consistent with prediabetes  >or=6.5%  Consistent with diabetes    High levels of fetal hemoglobin interfere with the HbA1C  assay. Heterozygous hemoglobin variants (HbS, HgC, etc)do  not significantly interfere with this assay.   However, presence of multiple variants may affect accuracy.     07/30/2021 6.0 (H) 4.0 - 5.6 % Final     Comment:     ADA Screening Guidelines:  5.7-6.4%  Consistent with prediabetes  >or=6.5%  Consistent with diabetes    High levels of fetal hemoglobin interfere with the HbA1C  assay. Heterozygous hemoglobin variants (HbS, HgC, etc)do  not significantly interfere with this assay.   However, presence of multiple variants may affect accuracy.         Review of Systems   Constitutional: Negative for chills, decreased appetite, diaphoresis and fever.   HENT:  Negative for congestion and hearing loss.    Cardiovascular:  Negative for chest pain, claudication, leg swelling and syncope.   Respiratory:  Negative for cough and shortness of breath.    Skin:  Positive for dry skin, nail changes and poor wound healing. Negative for color change, flushing, itching and rash.   Musculoskeletal:  Positive for arthritis, back pain and joint pain. Negative for joint swelling.   Gastrointestinal:  Negative for nausea and vomiting.   Neurological:  Positive for numbness. Negative for paresthesias.   Psychiatric/Behavioral:  Negative for altered mental status. The patient is not nervous/anxious.          Objective:      Physical Exam  Constitutional:       General: He is not in acute distress.     Appearance: Normal appearance. He is well-developed. He is not diaphoretic.   Cardiovascular:      Comments: Dorsalis pedis and posterior tibial pulses are mildly diminished. Skin temperature is within normal limits. Toes are cool to touch and feet are warm proximally. Hair growth is diminished. Skin is atrophic and with mild hyperpigmentation.  No edema noted, bilaterally.   Musculoskeletal:         General: No tenderness.      Comments: Adequate joint range of motion without pain, limitation, nor crepitation to foot and ankle joints. Muscle strength is 5/5 in all groups bilaterally.    Hallux malleus to right great toe   Feet:      Right foot:      Skin integrity: Dry skin present. No ulcer, blister, erythema or warmth.      Left foot:      Skin integrity: Dry skin present. No ulcer, blister, erythema or warmth.   Skin:     General: Skin is warm and dry.      Capillary Refill: Capillary refill takes less than 2 seconds.      Comments: Skin is warm and dry, no acute signs of infection noted bilaterally      Toenails are thickened by 2-4 mm's, dystrophic, and are darkened in coloration with subungual fungal debris.     Pre-ulcerative callus to medial right hallux, upon debridement, site is healed.     Calluses to bilateral hallux and 5th digit    Otherwise, no open wounds, macerations or hyperkeratotic lesions, bilaterally            Neurological:      Mental Status: He is alert and oriented to person, place, and time.      Sensory: Sensory deficit present.      Motor: No abnormal muscle tone.      Comments: Light touch within normal limits.    Psychiatric:         Mood and Affect: Mood normal.         Behavior: Behavior normal.         Thought Content: Thought content normal.     Right great toe ulcer is healed.     Previous Images:      Previous Images:                  Assessment:       Encounter Diagnoses   Name Primary?    Pre-ulcerative calluses Yes    Polyneuropathy due to type 2 diabetes mellitus     Onychomycosis     Type II diabetes mellitus with peripheral circulatory disorder     Corn or callus            Plan:       Santiago was seen today for follow-up and wound check.    Diagnoses and all orders for this visit:    Pre-ulcerative calluses    Polyneuropathy due to type 2 diabetes mellitus    Onychomycosis    Type II diabetes mellitus with  peripheral circulatory disorder    Corn or callus        I counseled the patient on his conditions, their implications and medical management.     -Routine foot care per attached note, ulcer remains healed    -Previously rx diabetic shoes.     -Continue follow up with Cardiology for PAD, previous TCOMS normal    -Shoe inspection. Diabetic Foot Education. Patient reminded of the importance of good nutrition and blood sugar control to help prevent podiatric complications of diabetes. Patient instructed on proper foot hygeine. We discussed wearing proper shoe gear, daily foot inspections, never walking without protective shoe gear, never putting sharp instruments to feet, routine podiatric nail visits      -RTC in 1 month, sooner PRN

## 2023-01-11 NOTE — PROCEDURES
"Routine Foot Care    Date/Time: 1/10/2023 2:45 PM  Performed by: Jackie Calderon DPM  Authorized by: Jackie Calderon DPM     Time out: Immediately prior to procedure a "time out" was called to verify the correct patient, procedure, equipment, support staff and site/side marked as required.    Consent Done?:  Yes (Verbal)  Hyperkeratotic Skin Lesions?: Yes    Number of trimmed lesions:  4  Location(s):  Left 1st Toe, Right 1st Toe, Left 5th Toe and Right 5th Toe    Nail Care Type:  Debride  Location(s): All  (Left 1st Toe, Left 3rd Toe, Left 2nd Toe, Left 4th Toe, Left 5th Toe, Right 1st Toe, Right 2nd Toe, Right 3rd Toe, Right 4th Toe and Right 5th Toe)  Patient tolerance:  Patient tolerated the procedure well with no immediate complications  Wound Debridement    Date/Time: 1/10/2023 2:45 PM  Performed by: Jackie Calderon DPM  Authorized by: Jackie Calderon DPM     Consent Done?:  Yes (Verbal)  Local anesthesia used?: No      Wound Details:    Location:  Right foot    Location:  Right 1st Toe    Type of Debridement:  Non-excisional       Length (cm):  1       Area (sq cm):  1       Width (cm):  1       Percent Debrided (%):  100       Depth (cm):  0       Total Area Debrided (sq cm):  1    Depth of debridement:  Epidermis/Dermis    Tissue debrided:  Epidermis    Devitalized tissue debrided:  Callus    Instruments:  Blade    Bleeding:  Minimal  Patient tolerance:  Patient tolerated the procedure well with no immediate complications     Upon debridement of pre-ulcerative callus, ulcer remains healed   "

## 2023-02-14 ENCOUNTER — OFFICE VISIT (OUTPATIENT)
Dept: PODIATRY | Facility: CLINIC | Age: 63
End: 2023-02-14
Payer: COMMERCIAL

## 2023-02-14 VITALS
SYSTOLIC BLOOD PRESSURE: 132 MMHG | HEIGHT: 75 IN | DIASTOLIC BLOOD PRESSURE: 80 MMHG | WEIGHT: 229 LBS | BODY MASS INDEX: 28.47 KG/M2 | HEART RATE: 67 BPM

## 2023-02-14 DIAGNOSIS — E11.42 POLYNEUROPATHY DUE TO TYPE 2 DIABETES MELLITUS: ICD-10-CM

## 2023-02-14 DIAGNOSIS — L84 PRE-ULCERATIVE CALLUSES: Primary | ICD-10-CM

## 2023-02-14 DIAGNOSIS — M20.30 HALLUX MALLEUS, UNSPECIFIED LATERALITY: ICD-10-CM

## 2023-02-14 PROCEDURE — 3008F PR BODY MASS INDEX (BMI) DOCUMENTED: ICD-10-PCS | Mod: CPTII,S$GLB,, | Performed by: STUDENT IN AN ORGANIZED HEALTH CARE EDUCATION/TRAINING PROGRAM

## 2023-02-14 PROCEDURE — 3079F DIAST BP 80-89 MM HG: CPT | Mod: CPTII,S$GLB,, | Performed by: STUDENT IN AN ORGANIZED HEALTH CARE EDUCATION/TRAINING PROGRAM

## 2023-02-14 PROCEDURE — 1159F MED LIST DOCD IN RCRD: CPT | Mod: CPTII,S$GLB,, | Performed by: STUDENT IN AN ORGANIZED HEALTH CARE EDUCATION/TRAINING PROGRAM

## 2023-02-14 PROCEDURE — 97597 WOUND DEBRIDEMENT: ICD-10-PCS | Mod: S$GLB,,, | Performed by: STUDENT IN AN ORGANIZED HEALTH CARE EDUCATION/TRAINING PROGRAM

## 2023-02-14 PROCEDURE — 3075F SYST BP GE 130 - 139MM HG: CPT | Mod: CPTII,S$GLB,, | Performed by: STUDENT IN AN ORGANIZED HEALTH CARE EDUCATION/TRAINING PROGRAM

## 2023-02-14 PROCEDURE — 99999 PR PBB SHADOW E&M-EST. PATIENT-LVL III: CPT | Mod: PBBFAC,,, | Performed by: STUDENT IN AN ORGANIZED HEALTH CARE EDUCATION/TRAINING PROGRAM

## 2023-02-14 PROCEDURE — 97597 DBRDMT OPN WND 1ST 20 CM/<: CPT | Mod: S$GLB,,, | Performed by: STUDENT IN AN ORGANIZED HEALTH CARE EDUCATION/TRAINING PROGRAM

## 2023-02-14 PROCEDURE — 1159F PR MEDICATION LIST DOCUMENTED IN MEDICAL RECORD: ICD-10-PCS | Mod: CPTII,S$GLB,, | Performed by: STUDENT IN AN ORGANIZED HEALTH CARE EDUCATION/TRAINING PROGRAM

## 2023-02-14 PROCEDURE — 99499 UNLISTED E&M SERVICE: CPT | Mod: S$GLB,,, | Performed by: STUDENT IN AN ORGANIZED HEALTH CARE EDUCATION/TRAINING PROGRAM

## 2023-02-14 PROCEDURE — 3008F BODY MASS INDEX DOCD: CPT | Mod: CPTII,S$GLB,, | Performed by: STUDENT IN AN ORGANIZED HEALTH CARE EDUCATION/TRAINING PROGRAM

## 2023-02-14 PROCEDURE — 99999 PR PBB SHADOW E&M-EST. PATIENT-LVL III: ICD-10-PCS | Mod: PBBFAC,,, | Performed by: STUDENT IN AN ORGANIZED HEALTH CARE EDUCATION/TRAINING PROGRAM

## 2023-02-14 PROCEDURE — 3079F PR MOST RECENT DIASTOLIC BLOOD PRESSURE 80-89 MM HG: ICD-10-PCS | Mod: CPTII,S$GLB,, | Performed by: STUDENT IN AN ORGANIZED HEALTH CARE EDUCATION/TRAINING PROGRAM

## 2023-02-14 PROCEDURE — 3075F PR MOST RECENT SYSTOLIC BLOOD PRESS GE 130-139MM HG: ICD-10-PCS | Mod: CPTII,S$GLB,, | Performed by: STUDENT IN AN ORGANIZED HEALTH CARE EDUCATION/TRAINING PROGRAM

## 2023-02-14 PROCEDURE — 99499 NO LOS: ICD-10-PCS | Mod: S$GLB,,, | Performed by: STUDENT IN AN ORGANIZED HEALTH CARE EDUCATION/TRAINING PROGRAM

## 2023-02-14 NOTE — PROGRESS NOTES
Subjective:      Patient ID: Santiago Lucia is a 62 y.o. male.    Chief Complaint: Nail Problem (Bilateral calluses)      Santiago is a 62 y.o. male who presents to the clinic for evaluation and treatment of high risk feet. Santiago has a past medical history of Avascular necrosis of bone of hip, left (10/30/2018), Coronary artery disease, DM w/o complication type II (8/9/2013), and NSTEMI (non-ST elevated myocardial infarction) (01/2013). The patient's chief complaint is foot ulcer, right foot. This patient has documented high risk feet requiring routine maintenance secondary to peripheral neuropathy. Recent admission for abscess and recent MRI positive for osteomyelitis. Currently treated with 6 week of antibiotics per Infectious Disease recommendation. Relates to mild pain. No other pedal complaints.  7/6/22: Pt seen today for RFC and pre-ulcerative callus to right great toe. No new pedal complaints. States he is wearing the short boot without issues and using urea cream on callus. States he is working on finding a job where is sitting down to avoid ulcer to his foot.     11/1/22: Pt seen today for annual foot exam and routine foot care and bilateral great toe, pre-ulcerative callus. No new pedal complaints.     1/10/23: Seen today for routine foot care and pre-ulcerative callus of right hallux. No new pedal complaints.     2/14/23: Seen today for pre-ulcerative callus, no new pedal complaints.     PCP: Yon Asif MD    Date Last Seen by PCP: Dr. Velasquez 4/14/22    Current shoe gear:  Per above    History of Trauma: negative  Sign of Infection: none    Hemoglobin A1C   Date Value Ref Range Status   08/18/2022 5.6 4.0 - 5.6 % Final     Comment:     ADA Screening Guidelines:  5.7-6.4%  Consistent with prediabetes  >or=6.5%  Consistent with diabetes    High levels of fetal hemoglobin interfere with the HbA1C  assay. Heterozygous hemoglobin variants (HbS, HgC, etc)do  not significantly interfere with this assay.    However, presence of multiple variants may affect accuracy.     01/11/2022 6.0 (H) 4.0 - 5.6 % Final     Comment:     ADA Screening Guidelines:  5.7-6.4%  Consistent with prediabetes  >or=6.5%  Consistent with diabetes    High levels of fetal hemoglobin interfere with the HbA1C  assay. Heterozygous hemoglobin variants (HbS, HgC, etc)do  not significantly interfere with this assay.   However, presence of multiple variants may affect accuracy.     07/30/2021 6.0 (H) 4.0 - 5.6 % Final     Comment:     ADA Screening Guidelines:  5.7-6.4%  Consistent with prediabetes  >or=6.5%  Consistent with diabetes    High levels of fetal hemoglobin interfere with the HbA1C  assay. Heterozygous hemoglobin variants (HbS, HgC, etc)do  not significantly interfere with this assay.   However, presence of multiple variants may affect accuracy.         Review of Systems   Constitutional: Negative for chills, decreased appetite, diaphoresis and fever.   HENT:  Negative for congestion and hearing loss.    Cardiovascular:  Negative for chest pain, claudication, leg swelling and syncope.   Respiratory:  Negative for cough and shortness of breath.    Skin:  Positive for dry skin, nail changes and poor wound healing. Negative for color change, flushing, itching and rash.   Musculoskeletal:  Positive for arthritis, back pain and joint pain. Negative for joint swelling.   Gastrointestinal:  Negative for nausea and vomiting.   Neurological:  Positive for numbness. Negative for paresthesias.   Psychiatric/Behavioral:  Negative for altered mental status. The patient is not nervous/anxious.          Objective:      Physical Exam  Constitutional:       General: He is not in acute distress.     Appearance: Normal appearance. He is well-developed. He is not diaphoretic.   Cardiovascular:      Comments: Dorsalis pedis and posterior tibial pulses are mildly diminished. Skin temperature is within normal limits. Toes are cool to touch and feet are warm  proximally. Hair growth is diminished. Skin is atrophic and with mild hyperpigmentation. No edema noted, bilaterally.   Musculoskeletal:         General: No tenderness.      Comments: Adequate joint range of motion without pain, limitation, nor crepitation to foot and ankle joints. Muscle strength is 5/5 in all groups bilaterally.    Hallux malleus to right great toe   Feet:      Right foot:      Skin integrity: Dry skin present. No ulcer, blister, erythema or warmth.      Left foot:      Skin integrity: Dry skin present. No ulcer, blister, erythema or warmth.   Skin:     General: Skin is warm and dry.      Capillary Refill: Capillary refill takes less than 2 seconds.      Comments: Skin is warm and dry, no acute signs of infection noted bilaterally      Toenails are thickened by 2-4 mm's, dystrophic, and are darkened in coloration with subungual fungal debris.     Pre-ulcerative callus to medial right hallux, upon debridement, site is healed.     Calluses to bilateral hallux and 5th digit    Otherwise, no open wounds, macerations or hyperkeratotic lesions, bilaterally            Neurological:      Mental Status: He is alert and oriented to person, place, and time.      Sensory: Sensory deficit present.      Motor: No abnormal muscle tone.      Comments: Light touch within normal limits.    Psychiatric:         Mood and Affect: Mood normal.         Behavior: Behavior normal.         Thought Content: Thought content normal.     Right great toe ulcer is healed.     Previous Images:      Previous Images:                  Assessment:       Encounter Diagnoses   Name Primary?    Pre-ulcerative calluses Yes    Polyneuropathy due to type 2 diabetes mellitus     Hallux malleus, unspecified laterality            Plan:       Santiago was seen today for nail problem.    Diagnoses and all orders for this visit:    Pre-ulcerative calluses    Polyneuropathy due to type 2 diabetes mellitus    Hallux malleus, unspecified  laterality        I counseled the patient on his conditions, their implications and medical management.     -Pre-ulcerative callus debridement performed, ulcer remains healed    -Previously rx diabetic shoes.     -Continue follow up with Cardiology for PAD, previous TCOMS normal    -Shoe inspection. Diabetic Foot Education. Patient reminded of the importance of good nutrition and blood sugar control to help prevent podiatric complications of diabetes. Patient instructed on proper foot hygeine. We discussed wearing proper shoe gear, daily foot inspections, never walking without protective shoe gear, never putting sharp instruments to feet, routine podiatric nail visits      -RTC in 1 month, sooner PRN

## 2023-02-15 NOTE — PROCEDURES
Wound Debridement    Date/Time: 2/14/2023 3:00 PM  Performed by: Jackie Calderon DPM  Authorized by: Jackie Calderon DPM     Consent Done?:  Yes (Verbal)  Local anesthesia used?: No      Wound Details:    Location:  Right foot    Location:  Right 1st Toe    Type of Debridement:  Non-excisional       Length (cm):  1       Area (sq cm):  1       Width (cm):  1       Percent Debrided (%):  100       Depth (cm):  0       Total Area Debrided (sq cm):  1    Depth of debridement:  Epidermis/Dermis    Tissue debrided:  Epidermis    Devitalized tissue debrided:  Callus    Instruments:  Blade    Bleeding:  None  Patient tolerance:  Patient tolerated the procedure well with no immediate complications     No cultures were taken during this visit

## 2023-10-12 ENCOUNTER — PATIENT MESSAGE (OUTPATIENT)
Dept: RESPIRATORY THERAPY | Facility: HOSPITAL | Age: 63
End: 2023-10-12
Payer: COMMERCIAL

## 2024-04-12 ENCOUNTER — OFFICE VISIT (OUTPATIENT)
Dept: URGENT CARE | Facility: CLINIC | Age: 64
End: 2024-04-12
Payer: COMMERCIAL

## 2024-04-12 VITALS
TEMPERATURE: 98 F | SYSTOLIC BLOOD PRESSURE: 149 MMHG | RESPIRATION RATE: 16 BRPM | WEIGHT: 231.94 LBS | BODY MASS INDEX: 28.84 KG/M2 | OXYGEN SATURATION: 95 % | HEIGHT: 75 IN | HEART RATE: 72 BPM | DIASTOLIC BLOOD PRESSURE: 89 MMHG

## 2024-04-12 DIAGNOSIS — R10.9 FLANK PAIN: ICD-10-CM

## 2024-04-12 DIAGNOSIS — S29.012A MUSCLE STRAIN OF RIGHT UPPER BACK, INITIAL ENCOUNTER: Primary | ICD-10-CM

## 2024-04-12 LAB
BILIRUB UR QL STRIP: NEGATIVE
GLUCOSE UR QL STRIP: NEGATIVE
KETONES UR QL STRIP: NEGATIVE
LEUKOCYTE ESTERASE UR QL STRIP: NEGATIVE
PH, POC UA: 6.5 (ref 5–8)
POC BLOOD, URINE: NEGATIVE
POC NITRATES, URINE: NEGATIVE
PROT UR QL STRIP: NEGATIVE
SP GR UR STRIP: 1.01 (ref 1–1.03)
UROBILINOGEN UR STRIP-ACNC: NORMAL (ref 0.3–2.2)

## 2024-04-12 PROCEDURE — 81003 URINALYSIS AUTO W/O SCOPE: CPT | Mod: QW,S$GLB,,

## 2024-04-12 PROCEDURE — 99213 OFFICE O/P EST LOW 20 MIN: CPT | Mod: S$GLB,,,

## 2024-04-12 RX ORDER — HYDROCODONE BITARTRATE AND ACETAMINOPHEN 7.5; 325 MG/1; MG/1
TABLET ORAL
COMMUNITY
End: 2024-04-25

## 2024-04-12 RX ORDER — METHOCARBAMOL 500 MG/1
500 TABLET, FILM COATED ORAL 4 TIMES DAILY
Qty: 28 TABLET | Refills: 0 | Status: SHIPPED | OUTPATIENT
Start: 2024-04-12 | End: 2024-04-19

## 2024-04-12 RX ORDER — DICLOFENAC SODIUM 75 MG/1
1 TABLET, DELAYED RELEASE ORAL 2 TIMES DAILY PRN
COMMUNITY

## 2024-04-12 RX ORDER — LIDOCAINE 50 MG/G
1 PATCH TOPICAL DAILY
Qty: 7 PATCH | Refills: 0 | Status: SHIPPED | OUTPATIENT
Start: 2024-04-12 | End: 2024-04-19

## 2024-04-12 NOTE — PATIENT INSTRUCTIONS
A strain is an injury to muscles or tendons. Immediate treatment of sprains or strains includes RICE - Rest, ice, compression and elevation to the affected joint or limb as needed.    Put a heating pad on your back for 20 minutes at a time a few times each day. Never go to sleep with heat or ice on your back.  Stay as active as you can without causing too much pain. It is OK to rest your back for a day or so. Be sure to get up and move around gently during the day as you are able. After a few days, slowly start to increase your activity level as you are able to. If something causes your pain to come back or get worse, stop and go back to doing easier activities that did not hurt.  Protect your back. Limit sports, twisting, and heavy lifting until you are fully recovered.  Do not sit or  one position for a long time. You may want to sleep with a pillow under or between your knees if this eases your pain.  You may want to take medicine like ibuprofen or naproxen for swelling and pain. These are nonsteroidal anti-inflammatory drugs (NSAIDs).       If you were prescribed a muscle relaxer (Robaxin), do not drive or operate heavy equipment or machinery while taking these medications. These medications can make you drowsy. If you are driving, it is recommended to take ibuprofen or naproxen for pain and take robaxin at night.    Lidoderm patches for pain. You can wear one patch for up to 12 hours daily. Do not wear longer than 12 hours in a 24 hour period.    If you were not prescribed an anti-inflammatory medication, and if you do not have any history of stomach/intestinal ulcers, or kidney disease, or are not taking a blood thinner such as Coumadin, Plavix, Pradaxa, Eloquis, or Xaralta for example, it is OK to take over the counter Ibuprofen or Advil or Motrin or Aleve as directed.  Do not take these medications on an empty stomach.    Please drink plenty of fluids.  Please get plenty of rest.    Please remember  that you have received care at an urgent care today. Urgent cares are not emergency rooms and are not equipped to handle life threatening emergencies and cannot rule in or out certain medical conditions and you may be released before all of your medical problems are known or treated. Please arrange follow up with your primary care physician or speciality clinic (orthopedics) within 2-5 days if your signs and symptoms have not resolved or worsen.     Patient can call our Referral Hotline at [phone] to make an appointment.    Please return here or go to the Emergency Department for any concerns or worsening of condition.Patient was educated on signs/symptoms that would warrant emergent medical attention.   You have sudden shortness of breath or a sudden chest pain.  You have very bad belly pain, especially if it is worse when you try to get up or walk.  You start to have very bad pain in your chest, back, or head.  You feel like you might pass out when you try to sit up or stand.  You are very unsteady when you try to walk.  You are throwing up a lot.  You become confused or very sleepy or cannot wake up.  You have a wound that opens up and you can see muscle or other tissue below the skin.  You have a wound that is draining thick yellow, green, or bad-smelling discharge.  You have weakness or numbness in your arms or legs.  You have blood in your urine or bowel movements.  You have a fever of 100.4°F (38°C) or higher.  You have pain that does not get better with pain medicine.  You have a wound that is not healing.  You have a headache or stiff neck that does not get better in 2 to 3 days.

## 2024-04-12 NOTE — PROGRESS NOTES
"Subjective:      Patient ID: Santiago Lucia is a 63 y.o. male.    Vitals:  height is 6' 3" (1.905 m) and weight is 105.2 kg (231 lb 14.8 oz). His oral temperature is 97.7 °F (36.5 °C). His blood pressure is 149/89 (abnormal) and his pulse is 72. His respiration is 16 and oxygen saturation is 95%.     Chief Complaint: Flank Pain (Located to the right flank)    Patient is a 63 y.o. male with the compliant of right side flank pain that presented about 3 days ago, he reports with taking Naproxen to help with the pain 4/10 however, he reports with minimal relief.     Provider note starts below:  Patient presents to clinic for evaluation of right middle back pain which onset approximately 5 days ago. Patient states pain started the day after he lifted a case containing about 5-6 racks of ribs. He believes the case weighed about 50 pounds. States he was in an awkward position and wasn't able to use his legs to lift, resulting in him using his back and upper body. Patient states pain occurs when he is laying down on that side, reaching his right arm above his head, or bending sideways. He denies any pain at rest. No pain with deep breaths or coughing. No pain associated with eating. He takes diclofenac and naproxen daily for chronic hip pain, but states these medications have not helped his back pain. Patient denies any fever, chills, chest pain, palpitations, shortness of breath, cough, wheezing, bloody sputum, sputum production, abdominal pain, n/v/d, lightheadedness, dizziness, fatigue, urinary symptoms. No other complaints.     Flank Pain  This is a new problem. The current episode started in the past 7 days. The problem occurs intermittently. The problem has been gradually worsening since onset. The pain does not radiate. The pain is at a severity of 4/10. The pain is mild. The pain is Worse during the night. The symptoms are aggravated by bending and position. Pertinent negatives include no abdominal pain, bladder " incontinence, bowel incontinence, chest pain, dysuria, fever, headaches, leg pain, numbness, paresis, paresthesias, pelvic pain, perianal numbness, tingling, weakness or weight loss. Risk factors include lack of exercise. Treatments tried: Naproxen. The treatment provided no relief.       Constitution: Negative for chills, fatigue and fever.   Neck: Negative for neck pain and neck stiffness.   Cardiovascular:  Negative for chest pain, leg swelling, palpitations and sob on exertion.   Respiratory:  Negative for chest tightness, cough, sputum production, bloody sputum, shortness of breath and wheezing.    Gastrointestinal:  Negative for abdominal pain, nausea, vomiting, constipation, heartburn and bowel incontinence.   Genitourinary:  Negative for dysuria, frequency, urgency, urine decreased, flank pain, bladder incontinence, hematuria and pelvic pain.   Musculoskeletal:  Positive for back pain (right middle). Negative for trauma, joint pain, joint swelling, abnormal ROM of joint and pain with walking.   Skin:  Negative for pale, rash, wound, abrasion, laceration and bruising.   Neurological:  Negative for dizziness, light-headedness, loss of balance, headaches, disorientation, altered mental status and numbness.   Psychiatric/Behavioral:  Negative for altered mental status, disorientation and confusion.       Objective:     Physical Exam   Constitutional: He is oriented to person, place, and time.  Non-toxic appearance. He does not appear ill. No distress.   HENT:   Head: Normocephalic and atraumatic.   Eyes: Conjunctivae are normal. Extraocular movement intact   Neck: Neck supple. No neck rigidity present.   Cardiovascular: Normal rate, regular rhythm, normal heart sounds and normal pulses.   No murmur heard.Exam reveals no gallop and no friction rub.   Pulmonary/Chest: Effort normal and breath sounds normal. No respiratory distress. He has no wheezes. He has no rhonchi. He has no rales. He exhibits no tenderness.    Abdominal: Normal appearance. He exhibits no distension. There is no abdominal tenderness. There is no rebound, no guarding, negative Zavaleta's sign, no left CVA tenderness and no right CVA tenderness.   Musculoskeletal: Normal range of motion.         General: Normal range of motion.      Cervical back: He exhibits no tenderness.      Comments: No tenderness over C, T, or L spine. No tenderness over ribs. No tenderness to palpation over right thoracic region where pt complains of pain.    Neurological: He is alert, oriented to person, place, and time and at baseline.   Skin: Skin is warm and dry.   Psychiatric: His behavior is normal. Mood normal.   Nursing note and vitals reviewed.    Assessment:     Results for orders placed or performed in visit on 04/12/24   POCT Urinalysis, Dipstick, Automated, W/O Scope   Result Value Ref Range    POC Blood, Urine Negative Negative    POC Bilirubin, Urine Negative Negative    POC Urobilinogen, Urine norm 0.3 - 2.2    POC Ketones, Urine Negative Negative    POC Protein, Urine Negative Negative    POC Nitrates, Urine Negative Negative    POC Glucose, Urine Negative Negative    pH, UA 6.5 5 - 8    POC Specific Gravity, Urine 1.015 1.003 - 1.029    POC Leukocytes, Urine Negative Negative       1. Muscle strain of right upper back, initial encounter    2. Flank pain        Plan:     Muscle strain of right upper back, initial encounter  -     methocarbamoL (ROBAXIN) 500 MG Tab; Take 1 tablet (500 mg total) by mouth 4 (four) times daily. for 7 days  Dispense: 28 tablet; Refill: 0  -     LIDOcaine (LIDODERM) 5 %; Place 1 patch onto the skin once daily. Remove & Discard patch within 12 hours or as directed by MD for 7 days  Dispense: 7 patch; Refill: 0    Flank pain  -     POCT Urinalysis, Dipstick, Automated, W/O Scope      Medical Decision Making:   Differential Diagnosis:   Muscle strain of back  UTI  Cholecystitis  Pneumonia  Urgent Care Management:  Vital signs stable.  No  recent upper respiratory illness.  No other symptoms reported.  Will treat muscle strain.  Strict return to clinic or ED precautions should he develop new or worsening symptoms.       Patient Instructions   A strain is an injury to muscles or tendons. Immediate treatment of sprains or strains includes RICE - Rest, ice, compression and elevation to the affected joint or limb as needed.    Put a heating pad on your back for 20 minutes at a time a few times each day. Never go to sleep with heat or ice on your back.  Stay as active as you can without causing too much pain. It is OK to rest your back for a day or so. Be sure to get up and move around gently during the day as you are able. After a few days, slowly start to increase your activity level as you are able to. If something causes your pain to come back or get worse, stop and go back to doing easier activities that did not hurt.  Protect your back. Limit sports, twisting, and heavy lifting until you are fully recovered.  Do not sit or  one position for a long time. You may want to sleep with a pillow under or between your knees if this eases your pain.  You may want to take medicine like ibuprofen or naproxen for swelling and pain. These are nonsteroidal anti-inflammatory drugs (NSAIDs).       If you were prescribed a muscle relaxer (Robaxin), do not drive or operate heavy equipment or machinery while taking these medications. These medications can make you drowsy. If you are driving, it is recommended to take ibuprofen or naproxen for pain and take robaxin at night.    Lidoderm patches for pain. You can wear one patch for up to 12 hours daily. Do not wear longer than 12 hours in a 24 hour period.    If you were not prescribed an anti-inflammatory medication, and if you do not have any history of stomach/intestinal ulcers, or kidney disease, or are not taking a blood thinner such as Coumadin, Plavix, Pradaxa, Eloquis, or Xaralta for example, it is OK to  take over the counter Ibuprofen or Advil or Motrin or Aleve as directed.  Do not take these medications on an empty stomach.    Please drink plenty of fluids.  Please get plenty of rest.    Please remember that you have received care at an urgent care today. Urgent cares are not emergency rooms and are not equipped to handle life threatening emergencies and cannot rule in or out certain medical conditions and you may be released before all of your medical problems are known or treated. Please arrange follow up with your primary care physician or speciality clinic (orthopedics) within 2-5 days if your signs and symptoms have not resolved or worsen.     Patient can call our Referral Hotline at [phone] to make an appointment.    Please return here or go to the Emergency Department for any concerns or worsening of condition.Patient was educated on signs/symptoms that would warrant emergent medical attention.   You have sudden shortness of breath or a sudden chest pain.  You have very bad belly pain, especially if it is worse when you try to get up or walk.  You start to have very bad pain in your chest, back, or head.  You feel like you might pass out when you try to sit up or stand.  You are very unsteady when you try to walk.  You are throwing up a lot.  You become confused or very sleepy or cannot wake up.  You have a wound that opens up and you can see muscle or other tissue below the skin.  You have a wound that is draining thick yellow, green, or bad-smelling discharge.  You have weakness or numbness in your arms or legs.  You have blood in your urine or bowel movements.  You have a fever of 100.4°F (38°C) or higher.  You have pain that does not get better with pain medicine.  You have a wound that is not healing.  You have a headache or stiff neck that does not get better in 2 to 3 days.

## 2025-06-20 NOTE — PROGRESS NOTES
Discharge orders noted. Additional clinical references attached. Patient's discharge instructions given by bedside RN and reviewed via this VN.  Education provided on new medication, diagnosis, and follow-up appointments.  Teach back method used. Patient verbalized understanding. All questions answered. Bedside nurse to request transportation when patient ready to be escorted to [facility]. Bedside nurse made aware.     08/19/22 1354   AVS Confirmation   Discharge instructions and AVS given to and reviewed with patient and/or significant other. Yes        Negative

## 2025-08-25 ENCOUNTER — LAB VISIT (OUTPATIENT)
Dept: LAB | Facility: HOSPITAL | Age: 65
End: 2025-08-25
Attending: INTERNAL MEDICINE
Payer: COMMERCIAL

## 2025-08-25 DIAGNOSIS — I10 HYPERTENSION, UNSPECIFIED TYPE: ICD-10-CM

## 2025-08-25 DIAGNOSIS — Z12.5 PROSTATE CANCER SCREENING: ICD-10-CM

## 2025-08-25 DIAGNOSIS — E11.52 TYPE 2 DIABETES MELLITUS WITH DIABETIC PERIPHERAL ANGIOPATHY WITH GANGRENE, UNSPECIFIED WHETHER LONG TERM INSULIN USE: ICD-10-CM

## 2025-08-25 LAB
ABSOLUTE EOSINOPHIL (OHS): 0.2 K/UL
ABSOLUTE MONOCYTE (OHS): 1.61 K/UL (ref 0.3–1)
ABSOLUTE NEUTROPHIL COUNT (OHS): 13.59 K/UL (ref 1.8–7.7)
ALBUMIN SERPL BCP-MCNC: 4.5 G/DL (ref 3.5–5.2)
ALBUMIN/CREAT UR: 11.8 UG/MG
ALP SERPL-CCNC: 96 UNIT/L (ref 40–150)
ALT SERPL W/O P-5'-P-CCNC: 25 UNIT/L (ref 10–44)
ANION GAP (OHS): 8 MMOL/L (ref 8–16)
AST SERPL-CCNC: 26 UNIT/L (ref 11–45)
BASOPHILS # BLD AUTO: 0.1 K/UL
BASOPHILS NFR BLD AUTO: 0.5 %
BILIRUB SERPL-MCNC: 0.4 MG/DL (ref 0.1–1)
BUN SERPL-MCNC: 21 MG/DL (ref 8–23)
CALCIUM SERPL-MCNC: 10.2 MG/DL (ref 8.7–10.5)
CHLORIDE SERPL-SCNC: 107 MMOL/L (ref 95–110)
CHOLEST SERPL-MCNC: 160 MG/DL (ref 120–199)
CHOLEST/HDLC SERPL: 3.9 {RATIO} (ref 2–5)
CO2 SERPL-SCNC: 26 MMOL/L (ref 23–29)
CREAT SERPL-MCNC: 1.1 MG/DL (ref 0.5–1.4)
CREAT UR-MCNC: 245 MG/DL (ref 23–375)
EAG (OHS): 123 MG/DL (ref 68–131)
ERYTHROCYTE [DISTWIDTH] IN BLOOD BY AUTOMATED COUNT: 14.6 % (ref 11.5–14.5)
GFR SERPLBLD CREATININE-BSD FMLA CKD-EPI: >60 ML/MIN/1.73/M2
GLUCOSE SERPL-MCNC: 116 MG/DL (ref 70–110)
HBA1C MFR BLD: 5.9 % (ref 4–5.6)
HCT VFR BLD AUTO: 45.3 % (ref 40–54)
HDLC SERPL-MCNC: 41 MG/DL (ref 40–75)
HDLC SERPL: 25.6 % (ref 20–50)
HGB BLD-MCNC: 14.4 GM/DL (ref 14–18)
IMM GRANULOCYTES # BLD AUTO: 0.12 K/UL (ref 0–0.04)
IMM GRANULOCYTES NFR BLD AUTO: 0.6 % (ref 0–0.5)
LDLC SERPL CALC-MCNC: 97.8 MG/DL (ref 63–159)
LYMPHOCYTES # BLD AUTO: 4.55 K/UL (ref 1–4.8)
MCH RBC QN AUTO: 28.3 PG (ref 27–31)
MCHC RBC AUTO-ENTMCNC: 31.8 G/DL (ref 32–36)
MCV RBC AUTO: 89 FL (ref 82–98)
MICROALBUMIN UR-MCNC: 29 UG/ML
NONHDLC SERPL-MCNC: 119 MG/DL
NUCLEATED RBC (/100WBC) (OHS): 0 /100 WBC
PLATELET # BLD AUTO: 212 K/UL (ref 150–450)
PMV BLD AUTO: 10.1 FL (ref 9.2–12.9)
POTASSIUM SERPL-SCNC: 4.1 MMOL/L (ref 3.5–5.1)
PROT SERPL-MCNC: 7.8 GM/DL (ref 6–8.4)
PSA SERPL-MCNC: 9.03 NG/ML
RBC # BLD AUTO: 5.09 M/UL (ref 4.6–6.2)
RELATIVE EOSINOPHIL (OHS): 1 %
RELATIVE LYMPHOCYTE (OHS): 22.6 % (ref 18–48)
RELATIVE MONOCYTE (OHS): 8 % (ref 4–15)
RELATIVE NEUTROPHIL (OHS): 67.3 % (ref 38–73)
SODIUM SERPL-SCNC: 141 MMOL/L (ref 136–145)
TRIGL SERPL-MCNC: 106 MG/DL (ref 30–150)
WBC # BLD AUTO: 20.17 K/UL (ref 3.9–12.7)

## 2025-08-25 PROCEDURE — 84153 ASSAY OF PSA TOTAL: CPT

## 2025-08-25 PROCEDURE — 83036 HEMOGLOBIN GLYCOSYLATED A1C: CPT

## 2025-08-25 PROCEDURE — 85025 COMPLETE CBC W/AUTO DIFF WBC: CPT

## 2025-08-25 PROCEDURE — 82465 ASSAY BLD/SERUM CHOLESTEROL: CPT

## 2025-08-25 PROCEDURE — 36415 COLL VENOUS BLD VENIPUNCTURE: CPT

## 2025-08-25 PROCEDURE — 82040 ASSAY OF SERUM ALBUMIN: CPT

## 2025-08-25 PROCEDURE — 82043 UR ALBUMIN QUANTITATIVE: CPT

## (undated) DEVICE — CONTAINER SPECIMEN STR 3 0Z

## (undated) DEVICE — SEE MEDLINE ITEM 157131

## (undated) DEVICE — PAD PREP 50/CA

## (undated) DEVICE — GAUZE SPONGE 4'X4 12 PLY

## (undated) DEVICE — SYR B-D DISP CONTROL 10CC100/C

## (undated) DEVICE — GAUZE SOF-FORM 2X75

## (undated) DEVICE — SEE MEDLINE ITEM 152622

## (undated) DEVICE — COVER OVERHEAD SURG LT BLUE

## (undated) DEVICE — SEE MEDLINE ITEM 154981

## (undated) DEVICE — GLOVE BIOGEL ECLIPSE SZ 7.5

## (undated) DEVICE — SEE MEDLINE ITEM 156955

## (undated) DEVICE — PACK BASIC

## (undated) DEVICE — DRESSING XEROFORM FOIL PK 1X8

## (undated) DEVICE — SEE MEDLINE ITEM 157117

## (undated) DEVICE — SEE MEDLINE ITEM 157116

## (undated) DEVICE — NDL HYPO A BEVEL 22X1 1/2

## (undated) DEVICE — SYR 10CC LUER LOCK